# Patient Record
Sex: FEMALE | Race: WHITE | NOT HISPANIC OR LATINO | Employment: FULL TIME | URBAN - METROPOLITAN AREA
[De-identification: names, ages, dates, MRNs, and addresses within clinical notes are randomized per-mention and may not be internally consistent; named-entity substitution may affect disease eponyms.]

---

## 2017-05-12 ENCOUNTER — ALLSCRIPTS OFFICE VISIT (OUTPATIENT)
Dept: OTHER | Facility: OTHER | Age: 27
End: 2017-05-12

## 2017-05-12 ENCOUNTER — APPOINTMENT (OUTPATIENT)
Dept: LAB | Facility: HOSPITAL | Age: 27
End: 2017-05-12
Attending: OBSTETRICS & GYNECOLOGY
Payer: COMMERCIAL

## 2017-05-12 DIAGNOSIS — O36.80X0 PREGNANCY WITH INCONCLUSIVE FETAL VIABILITY: ICD-10-CM

## 2017-05-12 DIAGNOSIS — Z32.01 ENCOUNTER FOR PREGNANCY TEST, RESULT POSITIVE: ICD-10-CM

## 2017-05-12 LAB
ABO GROUP BLD: NORMAL
B-HCG SERPL-ACNC: ABNORMAL MIU/ML
BLD GP AB SCN SERPL QL: NEGATIVE
PROGEST SERPL-MCNC: 8.5 NG/ML
RH BLD: POSITIVE
SPECIMEN EXPIRATION DATE: NORMAL

## 2017-05-12 PROCEDURE — 84702 CHORIONIC GONADOTROPIN TEST: CPT

## 2017-05-12 PROCEDURE — 36415 COLL VENOUS BLD VENIPUNCTURE: CPT

## 2017-05-12 PROCEDURE — 86900 BLOOD TYPING SEROLOGIC ABO: CPT

## 2017-05-12 PROCEDURE — 86850 RBC ANTIBODY SCREEN: CPT

## 2017-05-12 PROCEDURE — 84144 ASSAY OF PROGESTERONE: CPT

## 2017-05-12 PROCEDURE — 86901 BLOOD TYPING SEROLOGIC RH(D): CPT

## 2017-05-14 ENCOUNTER — APPOINTMENT (OUTPATIENT)
Dept: LAB | Facility: MEDICAL CENTER | Age: 27
End: 2017-05-14
Payer: COMMERCIAL

## 2017-05-14 DIAGNOSIS — Z32.01 ENCOUNTER FOR PREGNANCY TEST, RESULT POSITIVE: ICD-10-CM

## 2017-05-14 LAB — B-HCG SERPL-ACNC: ABNORMAL MIU/ML

## 2017-05-14 PROCEDURE — 36415 COLL VENOUS BLD VENIPUNCTURE: CPT

## 2017-05-14 PROCEDURE — 84702 CHORIONIC GONADOTROPIN TEST: CPT

## 2017-05-20 ENCOUNTER — HOSPITAL ENCOUNTER (OUTPATIENT)
Dept: ULTRASOUND IMAGING | Facility: HOSPITAL | Age: 27
Discharge: HOME | End: 2017-05-20
Attending: OBSTETRICS & GYNECOLOGY
Payer: COMMERCIAL

## 2017-05-20 DIAGNOSIS — O36.80X0 PREGNANCY WITH INCONCLUSIVE FETAL VIABILITY: ICD-10-CM

## 2017-05-20 PROCEDURE — 76801 OB US < 14 WKS SINGLE FETUS: CPT

## 2017-05-20 PROCEDURE — 76817 TRANSVAGINAL US OBSTETRIC: CPT

## 2017-05-25 ENCOUNTER — GENERIC CONVERSION - ENCOUNTER (OUTPATIENT)
Dept: OTHER | Facility: OTHER | Age: 27
End: 2017-05-25

## 2017-06-09 ENCOUNTER — ALLSCRIPTS OFFICE VISIT (OUTPATIENT)
Dept: OTHER | Facility: OTHER | Age: 27
End: 2017-06-09

## 2017-06-09 LAB
EXTERNAL HEMATOCRIT: 40.4 %
EXTERNAL HEMOGLOBIN: 13.7 G/DL
EXTERNAL HEPATITIS B SURFACE ANTIGEN: NEGATIVE
EXTERNAL HIV-1 ANTIBODY: NEGATIVE
EXTERNAL PLATELET COUNT: 199 K/ΜL
EXTERNAL RUBELLA IGG QUANTITATION: NORMAL
EXTERNAL SYPHILIS RPR SCREEN: NORMAL

## 2017-06-10 LAB
ABO GROUP BLD: NORMAL
BACTERIA UR QL AUTO: NORMAL
BASOPHILS # BLD AUTO: 0 %
BASOPHILS # BLD AUTO: 0 X10E3/UL (ref 0–0.2)
BILIRUB UR QL STRIP: NEGATIVE
BLD GP AB SCN SERPL QL: NEGATIVE
COLOR UR: YELLOW
COMMENT (HISTORICAL): CLEAR
DEPRECATED RDW RBC AUTO: 13.6 % (ref 12.3–15.4)
EOSINOPHIL # BLD AUTO: 0.1 X10E3/UL (ref 0–0.4)
EOSINOPHIL # BLD AUTO: 2 %
FECAL OCCULT BLOOD DIAGNOSTIC (HISTORICAL): NEGATIVE
GLUCOSE (HISTORICAL): NEGATIVE
HCT VFR BLD AUTO: 40.4 % (ref 34–46.6)
HEPATITIS B SURFACE ANTIGEN (HISTORICAL): NEGATIVE
HGB BLD-MCNC: 13.7 G/DL (ref 11.1–15.9)
HIV-1/2 AB-AG (HISTORICAL): NON REACTIVE
IMM.GRANULOCYTES (CD4/8) (HISTORICAL): 0 %
IMM.GRANULOCYTES (CD4/8) (HISTORICAL): 0 X10E3/UL (ref 0–0.1)
KETONES UR STRIP-MCNC: NEGATIVE MG/DL
LEUKOCYTE ESTERASE UR QL STRIP: NEGATIVE
LYMPHOCYTES # BLD AUTO: 2.1 X10E3/UL (ref 0.7–3.1)
LYMPHOCYTES # BLD AUTO: 25 %
MCH RBC QN AUTO: 30 PG (ref 26.6–33)
MCHC RBC AUTO-ENTMCNC: 33.9 G/DL (ref 31.5–35.7)
MCV RBC AUTO: 89 FL (ref 79–97)
MICROSCOPIC EXAMINATION (HISTORICAL): NORMAL
MICROSCOPIC EXAMINATION (HISTORICAL): NORMAL
MONOCYTES # BLD AUTO: 0.7 X10E3/UL (ref 0.1–0.9)
MONOCYTES (HISTORICAL): 8 %
NEUTROPHILS # BLD AUTO: 5.4 X10E3/UL (ref 1.4–7)
NEUTROPHILS # BLD AUTO: 65 %
NITRITE UR QL STRIP: NEGATIVE
NON-SQ EPI CELLS URNS QL MICRO: NORMAL /HPF
PH UR STRIP.AUTO: 7 [PH] (ref 5–7.5)
PLATELET # BLD AUTO: 199 X10E3/UL (ref 150–379)
PROT UR STRIP-MCNC: NEGATIVE MG/DL
RBC (HISTORICAL): 4.56 X10E6/UL (ref 3.77–5.28)
RBC (HISTORICAL): NORMAL /HPF
RH BLD: POSITIVE
RPR SCREEN (HISTORICAL): NON REACTIVE
RUBELLA, IGG (HISTORICAL): 1.9 INDEX
SP GR UR STRIP.AUTO: 1.01 (ref 1–1.03)
UROBILINOGEN UR QL STRIP.AUTO: 0.2 EU/DL (ref 0.2–1)
WBC # BLD AUTO: 8.4 X10E3/UL (ref 3.4–10.8)
WBC # BLD AUTO: NORMAL /HPF

## 2017-06-11 LAB
CULTURE RESULT (HISTORICAL): NORMAL
MISCELLANEOUS LAB TEST RESULT (HISTORICAL): NO GROWTH

## 2017-06-12 ENCOUNTER — GENERIC CONVERSION - ENCOUNTER (OUTPATIENT)
Dept: OTHER | Facility: OTHER | Age: 27
End: 2017-06-12

## 2017-06-26 ENCOUNTER — GENERIC CONVERSION - ENCOUNTER (OUTPATIENT)
Dept: OTHER | Facility: OTHER | Age: 27
End: 2017-06-26

## 2017-06-26 ENCOUNTER — ALLSCRIPTS OFFICE VISIT (OUTPATIENT)
Dept: PERINATAL CARE | Facility: CLINIC | Age: 27
End: 2017-06-26
Payer: COMMERCIAL

## 2017-06-26 PROCEDURE — 76813 OB US NUCHAL MEAS 1 GEST: CPT | Performed by: OBSTETRICS & GYNECOLOGY

## 2017-07-03 ENCOUNTER — GENERIC CONVERSION - ENCOUNTER (OUTPATIENT)
Dept: OTHER | Facility: OTHER | Age: 27
End: 2017-07-03

## 2017-07-03 ENCOUNTER — LAB REQUISITION (OUTPATIENT)
Dept: LAB | Facility: HOSPITAL | Age: 27
End: 2017-07-03
Payer: COMMERCIAL

## 2017-07-03 DIAGNOSIS — Z34.91 ENCOUNTER FOR SUPERVISION OF NORMAL PREGNANCY IN FIRST TRIMESTER: ICD-10-CM

## 2017-07-03 PROCEDURE — 87591 N.GONORRHOEAE DNA AMP PROB: CPT | Performed by: OBSTETRICS & GYNECOLOGY

## 2017-07-03 PROCEDURE — 87491 CHLMYD TRACH DNA AMP PROBE: CPT | Performed by: OBSTETRICS & GYNECOLOGY

## 2017-07-10 LAB — MISCELLANEOUS LAB TEST RESULT: NORMAL

## 2017-07-24 ENCOUNTER — GENERIC CONVERSION - ENCOUNTER (OUTPATIENT)
Dept: OTHER | Facility: OTHER | Age: 27
End: 2017-07-24

## 2017-08-21 ENCOUNTER — ALLSCRIPTS OFFICE VISIT (OUTPATIENT)
Dept: PERINATAL CARE | Facility: CLINIC | Age: 27
End: 2017-08-21
Payer: COMMERCIAL

## 2017-08-21 ENCOUNTER — GENERIC CONVERSION - ENCOUNTER (OUTPATIENT)
Dept: OTHER | Facility: OTHER | Age: 27
End: 2017-08-21

## 2017-08-21 PROCEDURE — 76817 TRANSVAGINAL US OBSTETRIC: CPT | Performed by: OBSTETRICS & GYNECOLOGY

## 2017-08-21 PROCEDURE — 76811 OB US DETAILED SNGL FETUS: CPT | Performed by: OBSTETRICS & GYNECOLOGY

## 2017-08-22 ENCOUNTER — ALLSCRIPTS OFFICE VISIT (OUTPATIENT)
Dept: OTHER | Facility: OTHER | Age: 27
End: 2017-08-22

## 2017-08-22 ENCOUNTER — LAB REQUISITION (OUTPATIENT)
Dept: LAB | Facility: HOSPITAL | Age: 27
End: 2017-08-22
Payer: COMMERCIAL

## 2017-08-22 DIAGNOSIS — O99.212 OBESITY COMPLICATING PREGNANCY IN SECOND TRIMESTER: ICD-10-CM

## 2017-08-22 LAB — GLUCOSE 1H P 50 G GLC PO SERPL-MCNC: 138 MG/DL

## 2017-08-22 PROCEDURE — 82950 GLUCOSE TEST: CPT | Performed by: OBSTETRICS & GYNECOLOGY

## 2017-08-23 ENCOUNTER — GENERIC CONVERSION - ENCOUNTER (OUTPATIENT)
Dept: OTHER | Facility: OTHER | Age: 27
End: 2017-08-23

## 2017-08-23 DIAGNOSIS — O99.212 OBESITY COMPLICATING PREGNANCY IN SECOND TRIMESTER: ICD-10-CM

## 2017-08-23 DIAGNOSIS — R73.09 OTHER ABNORMAL GLUCOSE: ICD-10-CM

## 2017-08-23 DIAGNOSIS — Z34.91 ENCOUNTER FOR SUPERVISION OF NORMAL PREGNANCY IN FIRST TRIMESTER: ICD-10-CM

## 2017-08-25 ENCOUNTER — APPOINTMENT (OUTPATIENT)
Dept: LAB | Facility: HOSPITAL | Age: 27
End: 2017-08-25
Attending: OBSTETRICS & GYNECOLOGY
Payer: COMMERCIAL

## 2017-08-25 DIAGNOSIS — Z34.91 ENCOUNTER FOR SUPERVISION OF NORMAL PREGNANCY IN FIRST TRIMESTER: ICD-10-CM

## 2017-08-25 LAB
GLUCOSE 1H P 100 G GLC PO SERPL-MCNC: 137 MG/DL (ref 65–179)
GLUCOSE 2H P 100 G GLC PO SERPL-MCNC: 100 MG/DL (ref 65–154)
GLUCOSE 3H P 100 G GLC PO SERPL-MCNC: 58 MG/DL (ref 65–139)
GLUCOSE P FAST SERPL-MCNC: 75 MG/DL (ref 65–99)

## 2017-08-25 PROCEDURE — 82951 GLUCOSE TOLERANCE TEST (GTT): CPT

## 2017-08-25 PROCEDURE — 36415 COLL VENOUS BLD VENIPUNCTURE: CPT

## 2017-08-25 PROCEDURE — 82952 GTT-ADDED SAMPLES: CPT

## 2017-08-28 ENCOUNTER — GENERIC CONVERSION - ENCOUNTER (OUTPATIENT)
Dept: OTHER | Facility: OTHER | Age: 27
End: 2017-08-28

## 2017-09-18 ENCOUNTER — ALLSCRIPTS OFFICE VISIT (OUTPATIENT)
Dept: OTHER | Facility: OTHER | Age: 27
End: 2017-09-18

## 2017-09-19 ENCOUNTER — ALLSCRIPTS OFFICE VISIT (OUTPATIENT)
Dept: OTHER | Facility: OTHER | Age: 27
End: 2017-09-19

## 2017-10-10 ENCOUNTER — GENERIC CONVERSION - ENCOUNTER (OUTPATIENT)
Dept: OTHER | Facility: OTHER | Age: 27
End: 2017-10-10

## 2017-10-14 ENCOUNTER — APPOINTMENT (OUTPATIENT)
Dept: LAB | Facility: HOSPITAL | Age: 27
End: 2017-10-14
Payer: COMMERCIAL

## 2017-10-14 DIAGNOSIS — R73.09 OTHER ABNORMAL GLUCOSE: ICD-10-CM

## 2017-10-14 DIAGNOSIS — O99.212 OBESITY COMPLICATING PREGNANCY IN SECOND TRIMESTER: ICD-10-CM

## 2017-10-14 LAB
BASOPHILS # BLD AUTO: 0.02 THOUSANDS/ΜL (ref 0–0.1)
BASOPHILS NFR BLD AUTO: 0 % (ref 0–1)
EOSINOPHIL # BLD AUTO: 0.35 THOUSAND/ΜL (ref 0–0.61)
EOSINOPHIL NFR BLD AUTO: 4 % (ref 0–6)
ERYTHROCYTE [DISTWIDTH] IN BLOOD BY AUTOMATED COUNT: 13.2 % (ref 11.6–15.1)
EXTERNAL HEMATOCRIT: 35.5 %
EXTERNAL HEMOGLOBIN: 11.9 G/DL
EXTERNAL PLATELET COUNT: 157 K/ΜL
GLUCOSE 1H P 100 G GLC PO SERPL-MCNC: 152 MG/DL (ref 65–179)
GLUCOSE 2H P 100 G GLC PO SERPL-MCNC: 141 MG/DL (ref 65–154)
GLUCOSE 3H P 100 G GLC PO SERPL-MCNC: 112 MG/DL (ref 65–139)
GLUCOSE P FAST SERPL-MCNC: 83 MG/DL (ref 65–99)
HCT VFR BLD AUTO: 35.5 % (ref 34.8–46.1)
HGB BLD-MCNC: 11.9 G/DL (ref 11.5–15.4)
LYMPHOCYTES # BLD AUTO: 1.78 THOUSANDS/ΜL (ref 0.6–4.47)
LYMPHOCYTES NFR BLD AUTO: 18 % (ref 14–44)
MCH RBC QN AUTO: 30.6 PG (ref 26.8–34.3)
MCHC RBC AUTO-ENTMCNC: 33.5 G/DL (ref 31.4–37.4)
MCV RBC AUTO: 91 FL (ref 82–98)
MONOCYTES # BLD AUTO: 0.79 THOUSAND/ΜL (ref 0.17–1.22)
MONOCYTES NFR BLD AUTO: 8 % (ref 4–12)
NEUTROPHILS # BLD AUTO: 6.77 THOUSANDS/ΜL (ref 1.85–7.62)
NEUTS SEG NFR BLD AUTO: 70 % (ref 43–75)
NRBC BLD AUTO-RTO: 0 /100 WBCS
PLATELET # BLD AUTO: 157 THOUSANDS/UL (ref 149–390)
PMV BLD AUTO: 10.3 FL (ref 8.9–12.7)
RBC # BLD AUTO: 3.89 MILLION/UL (ref 3.81–5.12)
WBC # BLD AUTO: 9.71 THOUSAND/UL (ref 4.31–10.16)

## 2017-10-14 PROCEDURE — 36415 COLL VENOUS BLD VENIPUNCTURE: CPT

## 2017-10-14 PROCEDURE — 82952 GTT-ADDED SAMPLES: CPT

## 2017-10-14 PROCEDURE — 82951 GLUCOSE TOLERANCE TEST (GTT): CPT

## 2017-10-14 PROCEDURE — 85025 COMPLETE CBC W/AUTO DIFF WBC: CPT

## 2017-10-25 ENCOUNTER — GENERIC CONVERSION - ENCOUNTER (OUTPATIENT)
Dept: OTHER | Facility: OTHER | Age: 27
End: 2017-10-25

## 2017-11-08 ENCOUNTER — GENERIC CONVERSION - ENCOUNTER (OUTPATIENT)
Dept: OTHER | Facility: OTHER | Age: 27
End: 2017-11-08

## 2017-11-08 ENCOUNTER — ALLSCRIPTS OFFICE VISIT (OUTPATIENT)
Dept: OTHER | Facility: OTHER | Age: 27
End: 2017-11-08

## 2017-11-09 ENCOUNTER — GENERIC CONVERSION - ENCOUNTER (OUTPATIENT)
Dept: OTHER | Facility: OTHER | Age: 27
End: 2017-11-09

## 2017-11-09 ENCOUNTER — ALLSCRIPTS OFFICE VISIT (OUTPATIENT)
Dept: PERINATAL CARE | Facility: CLINIC | Age: 27
End: 2017-11-09
Payer: COMMERCIAL

## 2017-11-09 PROCEDURE — 76816 OB US FOLLOW-UP PER FETUS: CPT | Performed by: OBSTETRICS & GYNECOLOGY

## 2017-11-22 ENCOUNTER — GENERIC CONVERSION - ENCOUNTER (OUTPATIENT)
Dept: OTHER | Facility: OTHER | Age: 27
End: 2017-11-22

## 2017-12-05 ENCOUNTER — LAB REQUISITION (OUTPATIENT)
Dept: LAB | Facility: HOSPITAL | Age: 27
End: 2017-12-05
Payer: COMMERCIAL

## 2017-12-05 ENCOUNTER — GENERIC CONVERSION - ENCOUNTER (OUTPATIENT)
Dept: OTHER | Facility: OTHER | Age: 27
End: 2017-12-05

## 2017-12-05 DIAGNOSIS — Z34.93 ENCOUNTER FOR SUPERVISION OF NORMAL PREGNANCY IN THIRD TRIMESTER: ICD-10-CM

## 2017-12-05 LAB
BASOPHILS # BLD AUTO: 0.02 THOUSANDS/ΜL (ref 0–0.1)
BASOPHILS NFR BLD AUTO: 0 % (ref 0–1)
EOSINOPHIL # BLD AUTO: 0.38 THOUSAND/ΜL (ref 0–0.61)
EOSINOPHIL NFR BLD AUTO: 4 % (ref 0–6)
ERYTHROCYTE [DISTWIDTH] IN BLOOD BY AUTOMATED COUNT: 13.1 % (ref 11.6–15.1)
HCT VFR BLD AUTO: 33.5 % (ref 34.8–46.1)
HGB BLD-MCNC: 11.1 G/DL (ref 11.5–15.4)
LYMPHOCYTES # BLD AUTO: 1.47 THOUSANDS/ΜL (ref 0.6–4.47)
LYMPHOCYTES NFR BLD AUTO: 17 % (ref 14–44)
MCH RBC QN AUTO: 29.2 PG (ref 26.8–34.3)
MCHC RBC AUTO-ENTMCNC: 33.1 G/DL (ref 31.4–37.4)
MCV RBC AUTO: 88 FL (ref 82–98)
MONOCYTES # BLD AUTO: 0.83 THOUSAND/ΜL (ref 0.17–1.22)
MONOCYTES NFR BLD AUTO: 9 % (ref 4–12)
NEUTROPHILS # BLD AUTO: 6.15 THOUSANDS/ΜL (ref 1.85–7.62)
NEUTS SEG NFR BLD AUTO: 70 % (ref 43–75)
NRBC BLD AUTO-RTO: 0 /100 WBCS
PLATELET # BLD AUTO: 183 THOUSANDS/UL (ref 149–390)
PMV BLD AUTO: 10.8 FL (ref 8.9–12.7)
RBC # BLD AUTO: 3.8 MILLION/UL (ref 3.81–5.12)
WBC # BLD AUTO: 8.9 THOUSAND/UL (ref 4.31–10.16)

## 2017-12-05 PROCEDURE — 85025 COMPLETE CBC W/AUTO DIFF WBC: CPT | Performed by: OBSTETRICS & GYNECOLOGY

## 2017-12-05 PROCEDURE — 87653 STREP B DNA AMP PROBE: CPT | Performed by: OBSTETRICS & GYNECOLOGY

## 2017-12-07 ENCOUNTER — APPOINTMENT (OUTPATIENT)
Dept: PERINATAL CARE | Facility: CLINIC | Age: 27
End: 2017-12-07
Payer: COMMERCIAL

## 2017-12-07 ENCOUNTER — GENERIC CONVERSION - ENCOUNTER (OUTPATIENT)
Dept: OTHER | Facility: OTHER | Age: 27
End: 2017-12-07

## 2017-12-07 LAB — GP B STREP DNA SPEC QL NAA+PROBE: NORMAL

## 2017-12-07 PROCEDURE — 59025 FETAL NON-STRESS TEST: CPT | Performed by: OBSTETRICS & GYNECOLOGY

## 2017-12-07 PROCEDURE — 76816 OB US FOLLOW-UP PER FETUS: CPT | Performed by: OBSTETRICS & GYNECOLOGY

## 2017-12-12 ENCOUNTER — GENERIC CONVERSION - ENCOUNTER (OUTPATIENT)
Dept: OTHER | Facility: OTHER | Age: 27
End: 2017-12-12

## 2017-12-14 ENCOUNTER — GENERIC CONVERSION - ENCOUNTER (OUTPATIENT)
Dept: OTHER | Facility: OTHER | Age: 27
End: 2017-12-14

## 2017-12-14 ENCOUNTER — APPOINTMENT (OUTPATIENT)
Dept: PERINATAL CARE | Facility: CLINIC | Age: 27
End: 2017-12-14
Payer: COMMERCIAL

## 2017-12-14 PROCEDURE — 59025 FETAL NON-STRESS TEST: CPT | Performed by: OBSTETRICS & GYNECOLOGY

## 2017-12-14 PROCEDURE — 76815 OB US LIMITED FETUS(S): CPT | Performed by: OBSTETRICS & GYNECOLOGY

## 2017-12-19 ENCOUNTER — GENERIC CONVERSION - ENCOUNTER (OUTPATIENT)
Dept: OTHER | Facility: OTHER | Age: 27
End: 2017-12-19

## 2017-12-21 ENCOUNTER — GENERIC CONVERSION - ENCOUNTER (OUTPATIENT)
Dept: OTHER | Facility: OTHER | Age: 27
End: 2017-12-21

## 2017-12-21 ENCOUNTER — APPOINTMENT (OUTPATIENT)
Dept: PERINATAL CARE | Facility: CLINIC | Age: 27
End: 2017-12-21
Payer: COMMERCIAL

## 2017-12-21 PROCEDURE — 59025 FETAL NON-STRESS TEST: CPT | Performed by: OBSTETRICS & GYNECOLOGY

## 2017-12-21 PROCEDURE — 76815 OB US LIMITED FETUS(S): CPT | Performed by: OBSTETRICS & GYNECOLOGY

## 2017-12-26 ENCOUNTER — GENERIC CONVERSION - ENCOUNTER (OUTPATIENT)
Dept: OTHER | Facility: OTHER | Age: 27
End: 2017-12-26

## 2017-12-28 ENCOUNTER — APPOINTMENT (OUTPATIENT)
Dept: PERINATAL CARE | Facility: CLINIC | Age: 27
End: 2017-12-28
Payer: COMMERCIAL

## 2017-12-28 ENCOUNTER — GENERIC CONVERSION - ENCOUNTER (OUTPATIENT)
Dept: OTHER | Facility: OTHER | Age: 27
End: 2017-12-28

## 2017-12-28 ENCOUNTER — ANESTHESIA EVENT (INPATIENT)
Dept: LABOR AND DELIVERY | Facility: HOSPITAL | Age: 27
End: 2017-12-28
Payer: COMMERCIAL

## 2017-12-28 PROCEDURE — 76815 OB US LIMITED FETUS(S): CPT | Performed by: OBSTETRICS & GYNECOLOGY

## 2017-12-28 PROCEDURE — 59025 FETAL NON-STRESS TEST: CPT | Performed by: OBSTETRICS & GYNECOLOGY

## 2017-12-29 ENCOUNTER — HOSPITAL ENCOUNTER (INPATIENT)
Facility: HOSPITAL | Age: 27
LOS: 3 days | Discharge: HOME | End: 2018-01-01
Attending: OBSTETRICS & GYNECOLOGY | Admitting: OBSTETRICS & GYNECOLOGY
Payer: COMMERCIAL

## 2017-12-29 ENCOUNTER — ANESTHESIA (INPATIENT)
Dept: LABOR AND DELIVERY | Facility: HOSPITAL | Age: 27
End: 2017-12-29
Payer: COMMERCIAL

## 2017-12-29 DIAGNOSIS — Z3A.39 39 WEEKS GESTATION OF PREGNANCY: Primary | ICD-10-CM

## 2017-12-29 PROBLEM — Z98.891 S/P CESAREAN SECTION: Status: ACTIVE | Noted: 2017-12-29

## 2017-12-29 PROBLEM — O99.211 MATERNAL OBESITY, ANTEPARTUM, FIRST TRIMESTER: Status: ACTIVE | Noted: 2017-06-28

## 2017-12-29 PROBLEM — E66.01 MORBID OBESITY DUE TO EXCESS CALORIES (HCC): Status: ACTIVE | Noted: 2017-12-29

## 2017-12-29 LAB
ABO GROUP BLD: NORMAL
BASE EXCESS BLDCOA CALC-SCNC: -4.2 MMOL/L (ref 3–11)
BASE EXCESS BLDCOV CALC-SCNC: -2.4 MMOL/L (ref 1–9)
BLD GP AB SCN SERPL QL: NEGATIVE
ERYTHROCYTE [DISTWIDTH] IN BLOOD BY AUTOMATED COUNT: 13.5 % (ref 11.6–15.1)
HCO3 BLDCOA-SCNC: 23.3 MMOL/L (ref 17.3–27.3)
HCO3 BLDCOV-SCNC: 24.2 MMOL/L (ref 12.2–28.6)
HCT VFR BLD AUTO: 34.1 % (ref 34.8–46.1)
HGB BLD-MCNC: 10.8 G/DL (ref 11.5–15.4)
MCH RBC QN AUTO: 27.9 PG (ref 26.8–34.3)
MCHC RBC AUTO-ENTMCNC: 31.7 G/DL (ref 31.4–37.4)
MCV RBC AUTO: 88 FL (ref 82–98)
O2 CT VFR BLDCOA CALC: 2.7 ML/DL
OXYHGB MFR BLDCOA: 13.8 %
OXYHGB MFR BLDCOV: 42.7 %
PCO2 BLDCOA: 52.8 MM[HG] (ref 30–60)
PCO2 BLDCOV: 48.3 MM HG (ref 27–43)
PH BLDCOA: 7.26 [PH] (ref 7.23–7.43)
PH BLDCOV: 7.32 [PH] (ref 7.19–7.49)
PLATELET # BLD AUTO: 191 THOUSANDS/UL (ref 149–390)
PMV BLD AUTO: 10.1 FL (ref 8.9–12.7)
PO2 BLDCOA: 20.5 MM HG (ref 5–25)
PO2 BLDCOV: 21.2 MM HG (ref 15–45)
RBC # BLD AUTO: 3.87 MILLION/UL (ref 3.81–5.12)
RH BLD: POSITIVE
RPR SER QL: NORMAL
SAO2 % BLDCOV: 8.8 ML/DL
SPECIMEN EXPIRATION DATE: NORMAL
WBC # BLD AUTO: 8.52 THOUSAND/UL (ref 4.31–10.16)

## 2017-12-29 PROCEDURE — 85027 COMPLETE CBC AUTOMATED: CPT | Performed by: OBSTETRICS & GYNECOLOGY

## 2017-12-29 PROCEDURE — 86592 SYPHILIS TEST NON-TREP QUAL: CPT | Performed by: OBSTETRICS & GYNECOLOGY

## 2017-12-29 PROCEDURE — 3E0R3BZ INTRODUCTION OF ANESTHETIC AGENT INTO SPINAL CANAL, PERCUTANEOUS APPROACH: ICD-10-PCS | Performed by: STUDENT IN AN ORGANIZED HEALTH CARE EDUCATION/TRAINING PROGRAM

## 2017-12-29 PROCEDURE — 86900 BLOOD TYPING SEROLOGIC ABO: CPT | Performed by: OBSTETRICS & GYNECOLOGY

## 2017-12-29 PROCEDURE — 82805 BLOOD GASES W/O2 SATURATION: CPT | Performed by: OBSTETRICS & GYNECOLOGY

## 2017-12-29 PROCEDURE — 94762 N-INVAS EAR/PLS OXIMTRY CONT: CPT

## 2017-12-29 PROCEDURE — 86901 BLOOD TYPING SEROLOGIC RH(D): CPT | Performed by: OBSTETRICS & GYNECOLOGY

## 2017-12-29 PROCEDURE — 86850 RBC ANTIBODY SCREEN: CPT | Performed by: OBSTETRICS & GYNECOLOGY

## 2017-12-29 RX ORDER — PROPOFOL 10 MG/ML
INJECTION, EMULSION INTRAVENOUS AS NEEDED
Status: DISCONTINUED | OUTPATIENT
Start: 2017-12-29 | End: 2017-12-29 | Stop reason: SURG

## 2017-12-29 RX ORDER — DEXAMETHASONE SODIUM PHOSPHATE 10 MG/ML
8 INJECTION, SOLUTION INTRAMUSCULAR; INTRAVENOUS ONCE AS NEEDED
Status: ACTIVE | OUTPATIENT
Start: 2017-12-29 | End: 2017-12-30

## 2017-12-29 RX ORDER — IBUPROFEN 600 MG/1
600 TABLET ORAL EVERY 6 HOURS PRN
Status: DISCONTINUED | OUTPATIENT
Start: 2017-12-29 | End: 2018-01-01 | Stop reason: HOSPADM

## 2017-12-29 RX ORDER — MORPHINE SULFATE 0.5 MG/ML
INJECTION, SOLUTION EPIDURAL; INTRATHECAL; INTRAVENOUS
Status: COMPLETED
Start: 2017-12-29 | End: 2017-12-29

## 2017-12-29 RX ORDER — DOCUSATE SODIUM 100 MG/1
100 CAPSULE, LIQUID FILLED ORAL 2 TIMES DAILY
Status: DISCONTINUED | OUTPATIENT
Start: 2017-12-29 | End: 2018-01-01 | Stop reason: HOSPADM

## 2017-12-29 RX ORDER — ONDANSETRON 2 MG/ML
4 INJECTION INTRAMUSCULAR; INTRAVENOUS EVERY 4 HOURS PRN
Status: DISPENSED | OUTPATIENT
Start: 2017-12-29 | End: 2017-12-30

## 2017-12-29 RX ORDER — ONDANSETRON 2 MG/ML
4 INJECTION INTRAMUSCULAR; INTRAVENOUS EVERY 8 HOURS PRN
Status: DISCONTINUED | OUTPATIENT
Start: 2017-12-29 | End: 2018-01-01 | Stop reason: HOSPADM

## 2017-12-29 RX ORDER — SODIUM CHLORIDE, SODIUM LACTATE, POTASSIUM CHLORIDE, CALCIUM CHLORIDE 600; 310; 30; 20 MG/100ML; MG/100ML; MG/100ML; MG/100ML
125 INJECTION, SOLUTION INTRAVENOUS CONTINUOUS
Status: DISCONTINUED | OUTPATIENT
Start: 2017-12-29 | End: 2017-12-29

## 2017-12-29 RX ORDER — OXYTOCIN/RINGER'S LACTATE 30/500 ML
250 PLASTIC BAG, INJECTION (ML) INTRAVENOUS CONTINUOUS
Status: DISCONTINUED | OUTPATIENT
Start: 2017-12-29 | End: 2018-01-01 | Stop reason: HOSPADM

## 2017-12-29 RX ORDER — ONDANSETRON 2 MG/ML
INJECTION INTRAMUSCULAR; INTRAVENOUS AS NEEDED
Status: DISCONTINUED | OUTPATIENT
Start: 2017-12-29 | End: 2017-12-29 | Stop reason: SURG

## 2017-12-29 RX ORDER — KETOROLAC TROMETHAMINE 30 MG/ML
30 INJECTION, SOLUTION INTRAMUSCULAR; INTRAVENOUS EVERY 6 HOURS
Status: COMPLETED | OUTPATIENT
Start: 2017-12-29 | End: 2017-12-30

## 2017-12-29 RX ORDER — SODIUM CHLORIDE, SODIUM LACTATE, POTASSIUM CHLORIDE, CALCIUM CHLORIDE 600; 310; 30; 20 MG/100ML; MG/100ML; MG/100ML; MG/100ML
125 INJECTION, SOLUTION INTRAVENOUS CONTINUOUS
Status: DISCONTINUED | OUTPATIENT
Start: 2017-12-29 | End: 2018-01-01 | Stop reason: HOSPADM

## 2017-12-29 RX ORDER — METOCLOPRAMIDE HYDROCHLORIDE 5 MG/ML
5 INJECTION INTRAMUSCULAR; INTRAVENOUS EVERY 6 HOURS PRN
Status: ACTIVE | OUTPATIENT
Start: 2017-12-29 | End: 2017-12-30

## 2017-12-29 RX ORDER — DIAPER,BRIEF,INFANT-TODD,DISP
EACH MISCELLANEOUS 4 TIMES DAILY PRN
Status: DISCONTINUED | OUTPATIENT
Start: 2017-12-29 | End: 2018-01-01 | Stop reason: HOSPADM

## 2017-12-29 RX ORDER — MORPHINE SULFATE 0.5 MG/ML
INJECTION, SOLUTION EPIDURAL; INTRATHECAL; INTRAVENOUS AS NEEDED
Status: DISCONTINUED | OUTPATIENT
Start: 2017-12-29 | End: 2017-12-29 | Stop reason: SURG

## 2017-12-29 RX ORDER — OXYTOCIN/RINGER'S LACTATE 30/500 ML
62.5 PLASTIC BAG, INJECTION (ML) INTRAVENOUS CONTINUOUS
Status: DISPENSED | OUTPATIENT
Start: 2017-12-29 | End: 2017-12-29

## 2017-12-29 RX ORDER — ACETAMINOPHEN 325 MG/1
650 TABLET ORAL EVERY 6 HOURS PRN
Status: DISCONTINUED | OUTPATIENT
Start: 2017-12-29 | End: 2018-01-01 | Stop reason: HOSPADM

## 2017-12-29 RX ORDER — BUPIVACAINE HYDROCHLORIDE 7.5 MG/ML
INJECTION, SOLUTION INTRASPINAL AS NEEDED
Status: DISCONTINUED | OUTPATIENT
Start: 2017-12-29 | End: 2017-12-29 | Stop reason: SURG

## 2017-12-29 RX ORDER — NALBUPHINE HCL 10 MG/ML
5 AMPUL (ML) INJECTION
Status: ACTIVE | OUTPATIENT
Start: 2017-12-29 | End: 2017-12-30

## 2017-12-29 RX ORDER — ONDANSETRON 2 MG/ML
4 INJECTION INTRAMUSCULAR; INTRAVENOUS EVERY 8 HOURS PRN
Status: DISCONTINUED | OUTPATIENT
Start: 2017-12-29 | End: 2017-12-29

## 2017-12-29 RX ORDER — OXYCODONE HYDROCHLORIDE AND ACETAMINOPHEN 5; 325 MG/1; MG/1
2 TABLET ORAL EVERY 4 HOURS PRN
Status: DISCONTINUED | OUTPATIENT
Start: 2017-12-30 | End: 2018-01-01 | Stop reason: HOSPADM

## 2017-12-29 RX ORDER — NALOXONE HYDROCHLORIDE 0.4 MG/ML
0.1 INJECTION, SOLUTION INTRAMUSCULAR; INTRAVENOUS; SUBCUTANEOUS
Status: ACTIVE | OUTPATIENT
Start: 2017-12-29 | End: 2017-12-30

## 2017-12-29 RX ORDER — OXYCODONE HYDROCHLORIDE AND ACETAMINOPHEN 5; 325 MG/1; MG/1
1 TABLET ORAL EVERY 4 HOURS PRN
Status: DISCONTINUED | OUTPATIENT
Start: 2017-12-30 | End: 2018-01-01 | Stop reason: HOSPADM

## 2017-12-29 RX ORDER — ACETAMINOPHEN 325 MG/1
650 TABLET ORAL EVERY 6 HOURS PRN
Status: ACTIVE | OUTPATIENT
Start: 2017-12-29 | End: 2017-12-30

## 2017-12-29 RX ADMIN — MORPHINE SULFATE 0.2 MG: 0.5 INJECTION, SOLUTION EPIDURAL; INTRATHECAL; INTRAVENOUS at 07:59

## 2017-12-29 RX ADMIN — ONDANSETRON HYDROCHLORIDE 4 MG: 2 INJECTION, SOLUTION INTRAVENOUS at 17:44

## 2017-12-29 RX ADMIN — MORPHINE SULFATE 1 MG: 0.5 INJECTION, SOLUTION EPIDURAL; INTRATHECAL; INTRAVENOUS at 09:00

## 2017-12-29 RX ADMIN — BUPIVACAINE HYDROCHLORIDE IN DEXTROSE 1.6 ML: 7.5 INJECTION, SOLUTION SUBARACHNOID at 07:59

## 2017-12-29 RX ADMIN — PROPOFOL 20 MG: 10 INJECTION, EMULSION INTRAVENOUS at 08:46

## 2017-12-29 RX ADMIN — MORPHINE SULFATE 1 MG: 0.5 INJECTION, SOLUTION EPIDURAL; INTRATHECAL; INTRAVENOUS at 09:01

## 2017-12-29 RX ADMIN — KETOROLAC TROMETHAMINE 30 MG: 30 INJECTION, SOLUTION INTRAMUSCULAR at 11:05

## 2017-12-29 RX ADMIN — Medication 62.5 MILLI-UNITS/MIN: at 10:11

## 2017-12-29 RX ADMIN — PROPOFOL 20 MG: 10 INJECTION, EMULSION INTRAVENOUS at 08:52

## 2017-12-29 RX ADMIN — OXYTOCIN 250 MILLI-UNITS/MIN: 10 INJECTION, SOLUTION INTRAMUSCULAR; INTRAVENOUS at 08:31

## 2017-12-29 RX ADMIN — DOCUSATE SODIUM 100 MG: 100 CAPSULE, LIQUID FILLED ORAL at 17:36

## 2017-12-29 RX ADMIN — CEFAZOLIN SODIUM 2000 MG: 1 INJECTION, POWDER, FOR SOLUTION INTRAMUSCULAR; INTRAVENOUS at 08:01

## 2017-12-29 RX ADMIN — SODIUM CHLORIDE, SODIUM LACTATE, POTASSIUM CHLORIDE, AND CALCIUM CHLORIDE 125 ML/HR: .6; .31; .03; .02 INJECTION, SOLUTION INTRAVENOUS at 17:35

## 2017-12-29 RX ADMIN — SODIUM CHLORIDE, SODIUM LACTATE, POTASSIUM CHLORIDE, AND CALCIUM CHLORIDE 125 ML/HR: .6; .31; .03; .02 INJECTION, SOLUTION INTRAVENOUS at 09:50

## 2017-12-29 RX ADMIN — KETOROLAC TROMETHAMINE 30 MG: 30 INJECTION, SOLUTION INTRAMUSCULAR at 17:35

## 2017-12-29 RX ADMIN — SODIUM CHLORIDE, SODIUM LACTATE, POTASSIUM CHLORIDE, AND CALCIUM CHLORIDE: .6; .31; .03; .02 INJECTION, SOLUTION INTRAVENOUS at 08:04

## 2017-12-29 RX ADMIN — KETOROLAC TROMETHAMINE 30 MG: 30 INJECTION, SOLUTION INTRAMUSCULAR at 23:53

## 2017-12-29 RX ADMIN — SODIUM CHLORIDE, SODIUM LACTATE, POTASSIUM CHLORIDE, AND CALCIUM CHLORIDE 1000 ML: .6; .31; .03; .02 INJECTION, SOLUTION INTRAVENOUS at 06:15

## 2017-12-29 RX ADMIN — ONDANSETRON HYDROCHLORIDE 4 MG: 2 INJECTION, SOLUTION INTRAVENOUS at 08:39

## 2017-12-29 RX ADMIN — PROPOFOL 20 MG: 10 INJECTION, EMULSION INTRAVENOUS at 08:58

## 2017-12-29 NOTE — OP NOTE
OPERATIVE REPORT  PATIENT NAME: Tamanna Clemente    :  1990  MRN: [id number]  Pt Location: [facility] L[facility] OR ROOM 01    SURGERY DATE: 2017    Surgeon(s) and Role:     * Raissa Khan MD - Primary     * Andre Jamison MD - Assisting    Preop Diagnosis:  Elective primary  section  Obesity  Abnormal 1hr Glucola with normal 3hr GTT    Procedure(s) (LRB):   SECTION () (N/A)    Specimen(s):  ID Type Source Tests Collected by Time Destination   A :  Cord Blood Cord BLOOD GAS, VENOUS, CORD, BLOOD GAS, ARTERIAL, CORD Raissa Khan MD 2017 [id number]    B :  Tissue (Placenta on Hold) OB Only Placenta PLACENTA IN STORAGE Raissa Khan MD 2017 [id number]        Estimated Blood Loss:   800mL    Drains:   None    Anesthesia Type:   Spinal    Operative Indications:  Elective primary  section  Suspected Macrosomia     Operative Findings:  Viable female  at 0830, APGARs  9/9, weight 8lbs 10oz  Clear amniotic fluid  Normal appearing placenta with 3-vessel cord  Normal appearing uterus, tubes and ovaries    Specimens:   1  Arterial and venous cord gases  Artrial pH 7 263, base excess -4 2  Venous pH 7 317, base excess -2 4  2  Cord blood  3  Segment of umbilical cord  4  Placenta to storage     Complications: None    Procedure Details   Decision was made to proceed with  section due to maternal request and suspected macrosomia  Patient was made aware of these findings and the proposed plan  Risks, benefits, possible complications, alternate treatment options, and expected outcomes were discussed with the patient  The patient agreed with the proposed plan and gave informed consent  The patient was taken to the operating room where she was properly identified to the OR staff and attending physician  She received Spinal anesthesia by Dr Eryn Low preoperatively  Fetal heart tones were appreciated and found to be appropriate   A Ma catheter and SCDs were in place  The vagina was prepped with Betadine and the abdomen was prepped with Chloraprep  Following appropriate drying time, the patient was draped in the usual sterile manner for a Pfannenstiel incision  The patient had received Ancef 2g IV pre-operatively for prophylaxis  A Time Out was held and the above information confirmed  The patient was identified as Darylene Robertson and the procedure verified as  Delivery  A Pfannenstiel incision was made and carried down through the underlying subcutaneous tissue to the fascia using a scalpel  Rectus fascia was then incised in the midline and extended laterally using Pérez scissors  The superior edge of the fascia was grasped with Kocher clamps, tented upward, and the underlying muscle was bluntly dissected off  The inferior edge was grasped with Kocher clamps and cleared in similar fashion  All anatomic layers were well-demarcated  The rectus muscles were  and the peritoneum was identified and subsequently entered and extended longitudinally with blunt dissection  The bladder blade was inserted  A low transverse uterine incision was made with the scalpel and extended laterally with blunt dissection  The amnion was entered sharply  Surgeons hand was inserted through the hysterotomy and the fetal head was palpated, with multiple attempts made to elevate the head through the hysterotomy  However, given difficulty elevating the fetal head, a vacuum was placed for assistance  The Kiwi cup was applied to the fetal median flexion point and centered over the sagittal sutures approx 3 cm anterior to the posterior fontanelle  A vacuum seal was established to a max of 550mmHg and gentle traction was applied  The fetal head was delivered through the hysterotomy with assistance of the Kiwi vacuum  There was 1 pull and 0 pop-offs   The vacuum was released upon delivery of the fetal head through the hysterotomy and delivery was performed thereafter with the assistance of fundal pressure  Baby had spontaneous cry with good color and tone  The umbilical cord was clamped and cut  The infant was handed off to the  providers  Arterial and venous cord gases, cord blood, and a segment of umbilical cord were obtained for evaluation and promptly sent to the lab  The placenta was expressed with uterine fundal massage and gentlel traction and was noted to have a 3 vessel cord  This was sent to the lab for storage  Pitocin was started by anesthesia  Uterus was noted to be firm and contracted down well  The uterus was exteriorized and a dry lap sponge was used to clear the cavity of clots and products of conception  The uterine incision was closed with a running locked suture of 0 Vicryl  A second layer of the same suture was used to imbricate the first closure  Good hemostasis was confirmed upon uterine closure  Warmed normal saline solution was used to irrigate the posterior culdesac and the uterus was returned to the abdomen  The paracolic gutters were inspected and cleared of all clots and debris with irrigation and moist lap sponges  The peritoneum and rectus muscle were closed using running, non-locked suture of 2-0 Vicryl  Fascia was closed with a running non-locked suture of 0 Vicryl  Subcutaneous tissues were closed with 3-0 Plain Gut in running non-locked suture  The skin was closed with 4-0 Monocryl in a subcuticular fashion  Histacryl was then applied  At the conclusion of the procedure, all needle, sponge, and instrument counts were noted to be correct x2  The patient tolerated the procedure well and was transferred to her the recovery room in stable condition  Beryle Shu, MD was present and participated in all key portions of the case        Patient Disposition:  PACU     SIGNATURE: Tara Chen MD  DATE: 2017  TIME: 9:35 AM

## 2017-12-29 NOTE — DISCHARGE INSTRUCTIONS
Section   WHAT YOU SHOULD KNOW:   A  delivery, or , is abdominal surgery to deliver your baby  There are many reasons you may need a   · A  may be scheduled before labor if you had a  with your last baby  It may be scheduled if your baby is not positioned normally, or you are pregnant with more than 1 baby  · Your caregiver may perform an emergency  during labor to prevent life-threatening complications for you or your baby  A  may be done if your cervix does not dilate after several hours of active labor  · Other reasons for a  include maternal infections and problems with the placenta  AFTER YOU LEAVE:   Medicines:   · Prescription pain medicine  may be given  Ask how to take this medicine safely  · Acetaminophen  decreases pain and fever  It is available without a doctor's order  Ask how much to take and how often to take it  Follow directions  Acetaminophen can cause liver damage if not taken correctly  · NSAIDs  help decrease swelling and pain or fever  This medicine is available with or without a doctor's order  NSAIDs can cause stomach bleeding or kidney problems in certain people  If you take blood thinner medicine, always ask your obstetrician if NSAIDs are safe for you  Always read the medicine label and follow directions  · Take your medicine as directed  Contact your obstetrician (OB) if you think your medicine is not helping or if you have side effects  Tell him if you are allergic to any medicine  Keep a list of the medicines, vitamins, and herbs you take  Include the amounts, and when and why you take them  Bring the list or the pill bottles to follow-up visits  Carry your medicine list with you in case of an emergency  Follow up with your OB as directed: You may need to return to have your stitches or staples removed   Write down your questions so you remember to ask them during your visits  Wound care:  Carefully wash your wound with soap and water every day  Keep your wound clean and dry  Wear loose, comfortable clothes that do not rub against your wound  Ask your OB about bathing and showering  Drink plenty of liquids: You can lower your risk for a blood clot if you drink plenty of liquids  Ask how much liquid to drink each day and which liquids are best for you  Limit activity until you have fully recovered from surgery:   · Ask when it is safe for you to drive, walk up stairs, lift heavy objects, and have sex  · Ask when it is okay to exercise, and what types of exercise to do  Start slowly and do more as you get stronger  Contact your OB if:   · You have heavy vaginal bleeding that fills 1 or more sanitary pads in 1 hour  · You have a fever  · Your incision is swollen, red, or draining pus  · You have questions or concerns about yourself or your baby  Seek care immediately or call 911 if:   · Blood soaks through your bandage  · Your stitches come apart  · You feel lightheaded, short of breath, and have chest pain  · You cough up blood  · Your arm or leg feels warm, tender, and painful  It may look swollen and red  © 2014 [address] is for End User's use only and may not be sold, redistributed or otherwise used for commercial purposes  All illustrations and images included in CareNotes® are the copyrighted property of A D A M , Inc  or Francisco Lopez  The above information is an  only  It is not intended as medical advice for individual conditions or treatments  Talk to your doctor, nurse or pharmacist before following any medical regimen to see if it is safe and effective for you

## 2017-12-29 NOTE — DISCHARGE SUMMARY
Discharge Summary - Marge Frankel 32 y o  female MRN: [id number]    Unit/Bed#: L&D 305-01 Encounter: [id number]    Admission Date: 2017     Discharge Date: 2018    Admitting Diagnosis:   1  Pregnancy at 39w6d  2  Obesity  4  Elective primary   5  Suspected Macrosomia    Discharge Diagnosis: same, delivered    Procedures: primary  section, low transverse incision    Admitting and DeliveringAttending: Yolanda Dodge MD     Discharging Attending: Same    Hospital Course:     Marge Frankel is a 32 y o   at 39w6d wks who was initially admitted for elective primary  section for suspected macrosomia  Pt received spinal anesthesia and 2g Ancef pre-operatively  She delivered a viable female  on 17 at 200  Weight 8lbs 10oz via primary  section, low transverse incision  Apgars were 9 (1 min) and 9 (5 min)  Patient tolerated the procedure well and was transferred to recovery in stable condition  Her post-operative course was uncomplicated  Preoperative hemaglobin was 10 8, postoperative was 9 0  Her postoperative pain was well controlled with oral analgesics  On day of discharge, she was ambulating and able to reasonably perform all ADLs  She was voiding and had appropriate bowel function  Pain was well controlled  She was discharged home on post-operative day #3 without complications  Patient was instructed to follow up with her OB as an outpatient and was given appropriate warnings to call provider if she develops signs of infection or uncontrolled pain  Complications: none apparent    Condition at discharge: good     Discharge instructions/Information to patient and family:   See after visit summary for information provided to patient and family  Provisions for Follow-Up Care:  See after visit summary for information related to follow-up care and any pertinent home health orders        Disposition: Home    Planned Readmission: No    [place], DO

## 2017-12-29 NOTE — LACTATION NOTE
This note was copied from a baby's chart  Called in and helped mom latch on left breast using football hold  Baby nursing well  Dad remains present and supportive

## 2017-12-29 NOTE — ANESTHESIA POSTPROCEDURE EVALUATION
Post-Op Assessment Note      CV Status:  Stable    Mental Status:  Alert and awake    Hydration Status:  Stable    PONV Controlled:  Controlled    Airway Patency:  Patent    Post Op Vitals Reviewed: Yes          Staff: Anesthesiologist           BP      Temp      Pulse     Resp      SpO2

## 2017-12-29 NOTE — PLAN OF CARE
BIRTH - VAGINAL/ SECTION     Fetal and maternal status remain reassuring during the birth process Completed     Emotionally satisfying birthing experience for mother/fetus Completed

## 2017-12-29 NOTE — ANESTHESIA PROCEDURE NOTES
Spinal Block    Patient location during procedure: OB  Start time: 12/29/2017 7:46 AM  Reason for block: primary anesthetic  Staffing  Anesthesiologist: Les Lee  Performed: anesthesiologist   Preanesthetic Checklist  Completed: patient identified, site marked, surgical consent, pre-op evaluation, timeout performed, IV checked, risks and benefits discussed and monitors and equipment checked  Spinal Block  Patient position: sitting  Prep: Betadine  Patient monitoring: heart rate, continuous pulse ox and frequent blood pressure checks  Approach: midline  Location: L4-5  Injection technique: single-shot  Needle  Needle type: pencil-tip   Needle length: 5 cm  Assessment  Sensory level: [id number] Assessment:  negative aspiration for heme, no paresthesia on injection and positive aspiration for clear CSF    Post-procedure:  site cleaned

## 2017-12-29 NOTE — H&P
H&P - Obstetrics   Mi Shine 32 y o  female MRN: [id number]  Unit/Bed#: L&D 329-01 Encounter: [id number]      History of Present Illness     Chief Complaint:  Scheduled primary  section    HPI:  Mi Shine is a 32 y o   at 39w6d weeks gestation who is being admitted for scheduled primary  section  Patient was counseled about the risks benefits and alternatives to a primary elective  and patient desires to proceed with  section  Her current obstetrical history is significant for morbid obesity BMI 42, failed 1 hour Glucola with normal 3 hour glucose tolerance; large gestational age-AC greater than 95th percentile:  8 lb by ultrasound 3 weeks ago  Contractions: None  Leakage of fluid: None  Vaginal Bleeding: None  Fetal movement: present  OB History    Para Term  AB Living   1             SAB TAB Ectopic Multiple Live Births                  # Outcome Date GA Lbr David/2nd Weight Sex Delivery Anes PTL Lv   1 Current                   Baby complications/comments:  Large for gestational age (AC > 95th percentile); 8 lb on ultrasound 3 weeks ago    Review of Systems   Constitutional: Negative for chills, diaphoresis, fatigue and fever  HENT: Negative  Respiratory: Negative for cough, choking, chest tightness, shortness of breath, wheezing and stridor  Cardiovascular: Negative for chest pain, palpitations and leg swelling  Gastrointestinal: Negative for abdominal pain, constipation, diarrhea, nausea and vomiting  Genitourinary: Negative for dysuria, flank pain, frequency, genital sores, pelvic pain, vaginal bleeding, vaginal discharge and vaginal pain  Musculoskeletal: Negative  Neurological: Negative for dizziness, seizures, syncope, facial asymmetry, weakness, light-headedness, numbness and headaches  Psychiatric/Behavioral: Negative  Historical Information   No past medical history on file    Past Surgical History: Procedure Laterality Date    WISDOM TOOTH EXTRACTION       Social History   History   Alcohol Use No     History   Drug Use No     History   Smoking Status    Never Smoker   Smokeless Tobacco    Never Used     Family History: non-contributory    Meds/Allergies      Prescriptions Prior to Admission   Medication    Prenatal Vit-Fe Fumarate-FA (PRENATAL VITAMIN PO)      No Known Allergies    OBJECTIVE:    Vitals: Blood pressure 120/79, pulse 98, temperature 97 9 °F (36 6 °C), temperature source Oral, resp  rate 18, height 5' 3" (1 6 m), weight 109 kg (240 lb)  Body mass index is 42 51 kg/m²  Physical Exam   Constitutional: She is oriented to person, place, and time  She appears well-developed and well-nourished  HENT:   Head: Normocephalic and atraumatic  Eyes: Conjunctivae and EOM are normal    Neck: Normal range of motion  Neck supple  Cardiovascular: Normal rate, regular rhythm, normal heart sounds and intact distal pulses  Exam reveals no gallop and no friction rub  No murmur heard  Pulmonary/Chest: Effort normal and breath sounds normal  No respiratory distress  She has no wheezes  She has no rales  Abdominal: Soft  Bowel sounds are normal  She exhibits no distension  There is no tenderness  There is no rebound and no guarding  Genitourinary: Vagina normal    Genitourinary Comments: Gravid uterus   Musculoskeletal: Normal range of motion  Neurological: She is alert and oriented to person, place, and time  Skin: Skin is warm and dry  Psychiatric: She has a normal mood and affect  Her behavior is normal  Judgment and thought content normal        Cervix: Not evaluated  Fetal heart rate: Baseline: 140 bpm, Variability: Moderate 6 - 25 bpm, Accelerations: Reactive, Decelerations: Absent and Category 1        [place]: none       EFW: 9lb 8oz    GBS: negative  Prenatal Labs: I have personally reviewed pertinent reports       AB positive  Antibody negative  Hemoglobin/hematocrit: 13   4  Platelets 170 K  Hepatitis-B surface antigen negative  HIV negative  Rubella immune  RPR nonreactive  Early 1 hour Glucola 138  Twenty-eight week 1 hour Glucola 157  3 hour glucose tolerance test:  83, 152, 141, 112  GBS negative    Invasive Devices          No matching active lines, drains, or airways            Assessment/Plan     ASSESSMENT:    Rory Shaikh 32 y o   at 39w6d weeks gestation who is being admitted for scheduled primary  section per maternal request     PLAN:   1) Admit   2) CBC, RPR, Blood Type, Ancef 3g   3) Analgesia per anesthesia   4) Anticipate 1LTCS   5) Case discussed with Dr Stella Lee MD  OB/GYN PGY-2  2017 6:20 AM

## 2017-12-29 NOTE — LACTATION NOTE
This note was copied from a baby's chart  CONSULT - LACTATION  Baby Girl Liu Boswell 0 days female MRN: [id number]    [facility] [address] NURSERY Room / Bed: L&D 305(N)/L&D 305(N) Encounter: [id number]    Maternal Information     MOTHER:  Erna Pierre  Maternal Age: 32 y o    OB History: #: 1, Date: 17, Sex: Female, Weight: 3912 g (8 lb 10 oz), GA: 39w6d, Delivery: , Low Transverse, Apgar1: 9, Apgar5: 9, Living: Living, Birth Comments: None   Previouse breast reduction surgery? No    Lactation history:   Has patient previously breast fed: No   How long had patient previously breast fed:     Previous breast feeding complications:       Past Surgical History:   Procedure Laterality Date    WISDOM TOOTH EXTRACTION         Birth information:  YOB: 2019   Time of birth: 8:30 AM   Sex: female   Delivery type: , Low Transverse   Birth Weight: 3912 g (8 lb 10 oz)   Percent of Weight Change: 0%     Gestational Age: 37w11d   [unfilled]    Assessment     Breast and nipple assessment: normal assessment    [place] Assessment: normal assessment    Feeding assessment: feeding well  LATCH:  Latch: Grasps breast, tongue down, lips flanged, rhythmic sucking   Audible Swallowing: Spontaneous and intermittent (24 hours old)   Type of Nipple: Everted (After stimulation)   Comfort (Breast/Nipple): Soft/non-tender   Hold (Positioning): No assist from staff, mother able to position/hold infant   LATCH Score: 6          Feeding recommendations:  breast feed on demand       Breastfeeding booklets given and reviewed with mother  Assisted with positioning and latching  Deep latch and good suck achieved on right breast using football hold  Demo hand expression  Encouraged mother to call for assistance as needed,phone # given  Dad present and supportive      Shirley Lozada RN 2017 11:34 AM

## 2017-12-29 NOTE — ANESTHESIA PREPROCEDURE EVALUATION
Review of Systems/Medical History  Patient summary reviewed  Chart reviewed  No history of anesthetic complications     Cardiovascular  Negative cardio ROS    Pulmonary  Negative pulmonary ROS ,        GI/Hepatic  Negative GI/hepatic ROS          Negative  ROS        Endo/Other  Negative endo/other ROS      GYN  Currently pregnant , Prior pregnancy/OB history : 1 ,          Hematology  Negative hematology ROS      Musculoskeletal  Obesity ,        Neurology  Negative neurology ROS      Psychology   Negative psychology ROS            Physical Exam    Airway    Mallampati score: II  TM Distance: >3 FB  Neck ROM: full     Dental   No notable dental hx     Cardiovascular  Comment: Negative ROS, Rhythm: regular, Rate: normal, Cardiovascular exam normal    Pulmonary  Pulmonary exam normal     Other Findings        Anesthesia Plan  ASA Score- 3     Anesthesia Type- spinal with ASA Monitors  Additional Monitors:   Airway Plan:         Plan Factors-Patient not instructed to abstain from smoking on day of procedure  Patient did not smoke on day of surgery  Induction-     Postoperative Plan- Plan for postoperative opioid use  Informed Consent- Anesthetic plan and risks discussed with patient and spouse  I personally reviewed this patient with the CRNA  Discussed and agreed on the Anesthesia Plan with the CRNA  Diego Calzada

## 2017-12-29 NOTE — PLAN OF CARE
BIRTH - VAGINAL/ SECTION     Fetal and maternal status remain reassuring during the birth process [de-identified]     Emotionally satisfying birthing experience for mother/fetus Progressing

## 2017-12-29 NOTE — PLAN OF CARE
ALTERATION IN THE BREAST     Optimize infant feeding at the breast Progressing        POSTPARTUM     Experiences normal postpartum course Progressing     Appropriate maternal -  bonding Progressing     Establishment of infant feeding pattern Progressing     Incision(s), wounds(s) or drain site(s) healing without S/S of infection Progressing

## 2017-12-30 LAB
BASOPHILS # BLD MANUAL: 0 THOUSAND/UL (ref 0–0.1)
BASOPHILS NFR MAR MANUAL: 0 % (ref 0–1)
EOSINOPHIL # BLD MANUAL: 0 THOUSAND/UL (ref 0–0.4)
EOSINOPHIL NFR BLD MANUAL: 0 % (ref 0–6)
ERYTHROCYTE [DISTWIDTH] IN BLOOD BY AUTOMATED COUNT: 13.8 % (ref 11.6–15.1)
HCT VFR BLD AUTO: 28 % (ref 34.8–46.1)
HGB BLD-MCNC: 9 G/DL (ref 11.5–15.4)
LYMPHOCYTES # BLD AUTO: 1.45 THOUSAND/UL (ref 0.6–4.47)
LYMPHOCYTES # BLD AUTO: 17 % (ref 14–44)
MCH RBC QN AUTO: 28.3 PG (ref 26.8–34.3)
MCHC RBC AUTO-ENTMCNC: 32.1 G/DL (ref 31.4–37.4)
MCV RBC AUTO: 88 FL (ref 82–98)
MONOCYTES # BLD AUTO: 0.77 THOUSAND/UL (ref 0–1.22)
MONOCYTES NFR BLD: 9 % (ref 4–12)
NEUTROPHILS # BLD MANUAL: 6.29 THOUSAND/UL (ref 1.85–7.62)
NEUTS BAND NFR BLD MANUAL: 2 % (ref 0–8)
NEUTS SEG NFR BLD AUTO: 72 % (ref 43–75)
NRBC BLD AUTO-RTO: 0 /100 WBCS
PLATELET # BLD AUTO: 152 THOUSANDS/UL (ref 149–390)
PLATELET BLD QL SMEAR: ADEQUATE
PMV BLD AUTO: 10.8 FL (ref 8.9–12.7)
RBC # BLD AUTO: 3.18 MILLION/UL (ref 3.81–5.12)
RBC MORPH BLD: NORMAL
TOTAL CELLS COUNTED SPEC: 100
WBC # BLD AUTO: 8.5 THOUSAND/UL (ref 4.31–10.16)

## 2017-12-30 PROCEDURE — 85027 COMPLETE CBC AUTOMATED: CPT | Performed by: OBSTETRICS & GYNECOLOGY

## 2017-12-30 PROCEDURE — 85007 BL SMEAR W/DIFF WBC COUNT: CPT | Performed by: OBSTETRICS & GYNECOLOGY

## 2017-12-30 RX ADMIN — DOCUSATE SODIUM 100 MG: 100 CAPSULE, LIQUID FILLED ORAL at 18:13

## 2017-12-30 RX ADMIN — Medication 1 TABLET: at 07:45

## 2017-12-30 RX ADMIN — DOCUSATE SODIUM 100 MG: 100 CAPSULE, LIQUID FILLED ORAL at 07:45

## 2017-12-30 RX ADMIN — KETOROLAC TROMETHAMINE 30 MG: 30 INJECTION, SOLUTION INTRAMUSCULAR at 06:22

## 2017-12-30 RX ADMIN — IBUPROFEN 600 MG: 600 TABLET, FILM COATED ORAL at 14:31

## 2017-12-30 RX ADMIN — IBUPROFEN 600 MG: 600 TABLET, FILM COATED ORAL at 20:46

## 2017-12-30 NOTE — PROGRESS NOTES
Progress Note - OB/GYN   Vu Case 32 y o  female MRN: [id number]  Unit/Bed#: L&D 305-01 Encounter: [id number]    Assessment:  Postop Day #1 s/p 1LTCS and 8 hour pitocin postpartum, stable    Plan:  1) Labs    Preop Hg 10 8   Postop Hg 9 0   VSS, asymptomatic   2) Ma removed yesterday evening  Patient urinated 150cc overnight  Continue to monitor voids  3) Continue routine post partum/post op care   Encourage ambulation   Encourage breastfeeding   Anticipate discharge 1/1/18    Subjective/Objective   Chief Complaint:     Post delivery  Patient is doing well this morning  She had some nausea yesterday afternoon but was able to tolerate grilled cheese and tea for dinner  Lochia WNL  Pain well controlled  Subjective:     Pain: yes, incisional, improved with meds  Tolerating PO: yes  Voiding: yes  Flatus: yes  BM: no  Ambulating: yes  Breastfeeding:  yes  Chest pain: no  Shortness of breath: no  Leg pain: no  Lochia: minimal    Objective:     Vitals: /78   Pulse 87   Temp 98 3 °F (36 8 °C) (Oral)   Resp 18   Ht 5' 3" (1 6 m)   Wt 109 kg (240 lb)   SpO2 97%   Breastfeeding?  Yes   BMI 42 51 kg/m²       Intake/Output Summary (Last 24 hours) at 12/30/17 0629  Last data filed at 12/30/17 0501   Gross per 24 hour   Intake           416 67 ml   Output             1175 ml   Net          -758 33 ml       Physical Exam:     AAOx3, NAD  CV, RRR  CTA b/l, no WRR  Soft, non-tender, non-distended, no rebound or guarding, no CVA tenderness  Uterine fundus firm and non-tender, at the umbilicus   Incision clean/dry/intact without signs of inflammation   Non tender      Lab Results   Component Value Date    WBC 8 50 12/30/2017    HGB 9 0 (L) 12/30/2017    HCT 28 0 (L) 12/30/2017    MCV 88 12/30/2017     12/30/2017                         Rimma Koch MD  12/30/2017  6:29 AM

## 2017-12-31 RX ADMIN — IBUPROFEN 600 MG: 600 TABLET, FILM COATED ORAL at 20:36

## 2017-12-31 RX ADMIN — DOCUSATE SODIUM 100 MG: 100 CAPSULE, LIQUID FILLED ORAL at 07:42

## 2017-12-31 RX ADMIN — Medication 1 TABLET: at 07:41

## 2017-12-31 RX ADMIN — IBUPROFEN 600 MG: 600 TABLET, FILM COATED ORAL at 14:09

## 2017-12-31 RX ADMIN — DOCUSATE SODIUM 100 MG: 100 CAPSULE, LIQUID FILLED ORAL at 17:55

## 2017-12-31 RX ADMIN — IBUPROFEN 600 MG: 600 TABLET, FILM COATED ORAL at 07:43

## 2017-12-31 RX ADMIN — IBUPROFEN 600 MG: 600 TABLET, FILM COATED ORAL at 02:12

## 2017-12-31 NOTE — PLAN OF CARE
Problem: POSTPARTUM  Goal: Experiences normal postpartum course  INTERVENTIONS:  - Monitor maternal vital signs  - Assess uterine involution and lochia   Outcome: Progressing    Goal: Appropriate maternal -  bonding  INTERVENTIONS:  - Identify family support  - Assess for appropriate maternal/infant bonding   -Encourage maternal/infant bonding opportunities  - Referral to  or  as needed   Outcome: Progressing    Goal: Establishment of infant feeding pattern  INTERVENTIONS:  - Assess breast/bottle feeding  - Refer to lactation as needed   Outcome: Progressing    Goal: Incision(s), wounds(s) or drain site(s) healing without S/S of infection  INTERVENTIONS  - Assess and document risk factors for skin impairment   - Assess and document dressing, incision, wound bed, drain sites and surrounding tissue  - Initiate Nutrition services consult and/or wound management as needed   Outcome: Progressing      Problem: ALTERATION IN THE BREAST  Goal: Optimize infant feeding at the breast  INTERVENTIONS:  - Latch, breast and nipple assessment  - Assess prior breast feeding history  - Hand expression of breast milk  - For cracked, bleeding and or sore nipples reassess latch, treat damaged nipple  -Educate mother on feeding cues  -Positioning/latch techniques   Outcome: Progressing

## 2017-12-31 NOTE — PROGRESS NOTES
Progress Note - OB/GYN   Rory Shaikh 32 y o  female MRN: [id number]  Unit/Bed#: L&D 305-01 Encounter: [id number]    Assessment:  POD#2 s/p Primary low transverse  section, stable    Plan:  1  Continue routine postoperative care  2  Encourage ambulation  3  Encourage breastfeeding  4  Postoperative pain control as needed    Disposition: stable, anticipate discharge POD #2    Subjective/Objective   Chief Complaint:     POD#2 s/p Primary low transverse  section    Subjective:     Pain: incisional, improved with motrin  Tolerating PO: yes  Voiding: yes  Flatus: yes  BM: no  Ambulating: yes  Breastfeeding: Breastfeeding  Chest pain: no  Shortness of breath: no  Leg pain: no  Lochia: minimal    Objective:     Vitals:  Vitals:    17 1502 17 1906 17 2213 17 0701   BP: 104/55 118/77 110/76 112/73   Pulse: 83 82 91 72   Resp: 18 16 16 18   Temp: 98 °F (36 7 °C) 98 5 °F (36 9 °C) 98 5 °F (36 9 °C) 97 9 °F (36 6 °C)   TempSrc: Oral Oral Oral    SpO2: 98% 97%  91%   Weight:       Height:           Physical Exam:     Physical Exam   Constitutional: She is oriented to person, place, and time  She appears well-developed and well-nourished  Cardiovascular: Normal rate, regular rhythm and normal heart sounds  Pulmonary/Chest: Effort normal and breath sounds normal    Abdominal: Soft  Bowel sounds are normal    Incision clean, dry, and intact  Closed with running absorbable sutures  Neurological: She is alert and oriented to person, place, and time  Uterine fundus firm and non-tender, at the umbilicus       Lab, Imaging and other studies: I have personally reviewed pertinent reports        Lab Results   Component Value Date    WBC 8 50 2017    HGB 9 0 (L) 2017    HCT 28 0 (L) 2017    MCV 88 2017     2017               Lennox Bhakta DO  17

## 2018-01-01 VITALS
HEART RATE: 80 BPM | SYSTOLIC BLOOD PRESSURE: 138 MMHG | BODY MASS INDEX: 42.52 KG/M2 | OXYGEN SATURATION: 91 % | HEIGHT: 63 IN | TEMPERATURE: 99.1 F | DIASTOLIC BLOOD PRESSURE: 74 MMHG | RESPIRATION RATE: 18 BRPM | WEIGHT: 240 LBS

## 2018-01-01 RX ORDER — IBUPROFEN 600 MG/1
600 TABLET ORAL EVERY 6 HOURS PRN
Qty: 30 TABLET | Refills: 0 | Status: SHIPPED | OUTPATIENT
Start: 2018-01-01 | End: 2018-01-25 | Stop reason: ALTCHOICE

## 2018-01-01 RX ORDER — OXYCODONE HYDROCHLORIDE AND ACETAMINOPHEN 5; 325 MG/1; MG/1
1 TABLET ORAL EVERY 4 HOURS PRN
Qty: 30 TABLET | Refills: 0 | Status: SHIPPED | OUTPATIENT
Start: 2018-01-01 | End: 2018-01-11

## 2018-01-01 RX ADMIN — DOCUSATE SODIUM 100 MG: 100 CAPSULE, LIQUID FILLED ORAL at 09:03

## 2018-01-01 RX ADMIN — Medication 1 TABLET: at 09:03

## 2018-01-01 RX ADMIN — IBUPROFEN 600 MG: 600 TABLET, FILM COATED ORAL at 10:21

## 2018-01-01 RX ADMIN — IBUPROFEN 600 MG: 600 TABLET, FILM COATED ORAL at 03:29

## 2018-01-01 NOTE — LACTATION NOTE
This note was copied from a baby's chart  Assisted mom and dad with positioning and latch  Deep latch obtained on left breast with nipple shield using football hold this morning  Sucked well and nursed for 20 minutes  Then nursed on right side using football hold this feed  Mom's milk is coming in and feels more prepared to go home today  Reminded of outpatient support available

## 2018-01-01 NOTE — PROGRESS NOTES
Progress Note - OB/GYN   Theresa Gibson 32 y o  female MRN: [id number]  Unit/Bed#: L&D 305-01 Encounter: [id number]    Assessment:  POD#3s/p Primary low transverse  section, stable    Plan:  1  Continue routine postoperative care  2  Encourage ambulation  3  Encourage breastfeeding  4  Postoperative pain control as needed     Disposition: stable, anticipate discharge today    Subjective/Objective   Chief Complaint:     POD#3 s/p Primary low transverse  section    Subjective:     Pain: incisional, improved with motrin  Tolerating PO: yes  Voiding: yes  Flatus: yes  BM: no  Ambulating: yes  Breastfeeding: Breastfeeding  Chest pain: no  Shortness of breath: no  Leg pain: no  Lochia: minimal    Objective:     Vitals:  Vitals:    17 0701 17 1450 17 2317 18 0724   BP: 112/73 121/72 125/58 138/74   Pulse: 72 73 80 80   Resp:    Temp: 97 9 °F (36 6 °C) 98 6 °F (37 °C) 98 4 °F (36 9 °C) 99 1 °F (37 3 °C)   TempSrc: Oral  Oral    SpO2: 91%      Weight:       Height:           Physical Exam:     Physical Exam   Constitutional: She appears well-developed and well-nourished  Cardiovascular: Normal rate, regular rhythm and normal heart sounds  Pulmonary/Chest: Effort normal and breath sounds normal    Abdominal: Soft  Bowel sounds are normal    Incision clean, dry, and intact  Closed with running absorbable sutures  Uterine fundus firm and non-tender, at the umbilicus       Lab, Imaging and other studies: I have personally reviewed pertinent reports  Lab Results   Component Value Date    WBC 8 50 2017    HGB 9 0 (L) 2017    HCT 28 0 (L) 2017    MCV 88 2017     2017           I have reviewed the notes, assessments performed by Dr Yuly Greenwood, I concur with his documentation of Theresa Gibson  I have examined the patient myself   Discharge instructions reviewed      Steve Brown DO  18

## 2018-01-09 LAB — PLACENTA IN STORAGE: NORMAL

## 2018-01-11 ENCOUNTER — GENERIC CONVERSION - ENCOUNTER (OUTPATIENT)
Dept: OTHER | Facility: OTHER | Age: 28
End: 2018-01-11

## 2018-01-11 NOTE — RESULT NOTES
Verified Results  (1) GLUCOSE TOLERANCE TEST, 3HR [id number] 12:04PM Lydia Hollins    Order Number: [id number]     Test Name Result Flag Reference   GLUCOSE TOLERANCE FASTING 75 mg/dL  65-99   Specimen collection should occur prior to Sulfasalazine administration due to the potential for falsely depressed results  Specimen collection should occur prior to Sulfapyridine administration due to the potential for falsely elevated results  GLUCOSE 1 HOUR 100 GM GLUC CHALLENGE-PREG  mg/dL     Specimen collection should occur prior to Sulfasalazine administration due to the potential for falsely depressed results  Specimen collection should occur prior to Sulfapyridine administration due to the potential for falsely elevated results  GLUCOSE 2 HOUR 100 GM GLUC CHALLENGE-PREG  mg/dL     Specimen collection should occur prior to Sulfasalazine administration due to the potential for falsely depressed results  Specimen collection should occur prior to Sulfapyridine administration due to the potential for falsely elevated results     GLUCOSE, 3HR (100gm Gluc Challenge Preg-Pts) 58 mg/dL L

## 2018-01-11 NOTE — PROGRESS NOTES
2017         RE: Marnie Restrepo                                 To: Ob/Gyn Care   [facility] [address]  [place]   MR#: [id number]                                  [address]   : 175 [facility], [address] [place] [address] Street: [id number]                             Fax: [phone]   (Exam #: [id number])      The LMP of this 32year old,  G1, P0-0-0-0 patient was MAR 2 2017, her   working ROSAURA is DEC 30 2017 and the current gestational age is 26 weeks 5   days by 1st Trimester Sono  A sonographic examination was performed on 2017 using real time equipment  The ultrasound examination was performed   using abdominal technique  The patient has a BMI of 41 0  Her blood   pressure today was 103/73  Earliest ultrasound found in her record: 17  8w0d  17 ROSAURA      Lucio Neely has no complaints today  She describes occasional contractions but   denies problems related to vaginal bleeding,  labor, or   hypertension  A diagnostic three-hour glucose tolerance test performed on    revealed 4 out of 4 normal values  She recently received the   influenza vaccine  Cardiac motion was observed at 140 bpm       INDICATIONS      morbid obesity      Exam Types      Level I      RESULTS      Fetus # 1 of 1   Vertex presentation   Placenta Location = Anterior   No placenta previa   Placenta Grade = I      MEASUREMENTS (* Included In Average GA)      AC              31 5 cm        35 weeks 4 days* (95%)   BPD              8 3 cm        33 weeks 2 days* (54%)   HC              30 4 cm        33 weeks 3 days* (47%)   Femur            6 2 cm        32 weeks 0 days* (40%)      Cerebellum       4 3 cm        34 weeks 6 days      HC/AC           0 97   FL/AC           0 20   FL/BPD          0 75   EFW (Ac/Fl/Hc)  2369 grams - 5 lbs 4 oz                 (66%)      THE AVERAGE GESTATIONAL AGE is 33 weeks 4 days +/- 21 days        AMNIOTIC FLUID Q1: 3 6      Q2: 4 5      Q3: 3 2      Q4: 4 2   BRANDI Total = 15 5 cm   Amniotic Fluid: Normal      ANATOMY COMMENTS      The prior US was limited with respect to evaluation of the distal right   lower extremity which is still limited on todays ultrasound due to the   constraints related to maternal morbid obesity and unfavorable fetal   position  No fetal structural abnormality is identified or suspected on   the Level I survey today  Follow up intracranial anatomy (calvarium,   cavum, lateral ventricles, and cerebellum), face, cardiac anatomy (short   axis view of the great vessels, four-chamber, outflow tract, and   three-vessel views), diaphragm, stomach, kidneys, and bladder appear   normal       IMPRESSION      Lorenzo IUP   33 weeks and 4 days by this ultrasound  (ROSAURA=DEC 24 2017)   32 weeks and 5 days by 1st Tri Sono  (ROSAURA=DEC 30 2017)   Vertex presentation   Regular fetal heart rate of 140 bpm   Anterior placenta   No placenta previa      GENERAL COMMENT      No fetal structural abnormality is identified on the Level I survey today  The amniotic fluid volume is normal  The fetal abdominal circumference   size is measured at the 95th percentile for this gestational age, with an   overall estimated fetal weight placed at the 66th percentile  Today's ultrasound findings and suggested follow-up were discussed in   detail with Flavia  Her gestational diabetes testing results were   discussed  She will return to the [facility]  in 4 weeks to assess   fetal interval growth and begin weekly non stress testing for the   indication of morbid obesity  Daily third trimester fetal kick counting   was discussed at the visit today  The face to face time, in addition to time spent discussing ultrasound   results, was approximately 10 minutes, greater than 50% of which was spent   during counseling and coordination of care  IVAN Welch M D     Maternal-Fetal Medicine   Electronically signed 11/10/17 12:31

## 2018-01-11 NOTE — RESULT NOTES
Verified Results  ([facility]) Pap Lb, rfx HPV ASCU [id number] 12:00AM Jeanna Bowman   No  of containers  Diana Mort 01 CYTYC Thin Prep Vial     Test Name Result Flag Reference   DIAGNOSIS: Comment     NEGATIVE FOR INTRAEPITHELIAL LESION AND MALIGNANCY  Specimen adequacy: Comment     Satisfactory for evaluation  Endocervical and/or squamous metaplastic  cells (endocervical component) are present  Clinician provided ICD10: Comment     Z01 419   Performed by: Crista Miller Cytotechnologist       Note: Comment     The Pap smear is a screening test designed to aid in the detection of  premalignant and malignant conditions of the uterine cervix  It is not a  diagnostic procedure and should not be used as the sole means of detecting  cervical cancer  Both false-positive and false-negative reports do occur    Comment     The HPV DNA reflex criteria were not met with this specimen result  therefore, no HPV testing was performed

## 2018-01-12 ENCOUNTER — GENERIC CONVERSION - ENCOUNTER (OUTPATIENT)
Dept: OTHER | Facility: OTHER | Age: 28
End: 2018-01-12

## 2018-01-12 ENCOUNTER — ALLSCRIPTS OFFICE VISIT (OUTPATIENT)
Dept: OTHER | Facility: OTHER | Age: 28
End: 2018-01-12

## 2018-01-13 VITALS
SYSTOLIC BLOOD PRESSURE: 103 MMHG | HEIGHT: 65 IN | DIASTOLIC BLOOD PRESSURE: 73 MMHG | WEIGHT: 239 LBS | BODY MASS INDEX: 39.82 KG/M2

## 2018-01-13 NOTE — PROGRESS NOTES
2017         RE: Belen Redd                                 To: Ob/Gyn Care   [facility]   MR#: [id number]                                  [address]  [address]   : 620 [facility], [address] [facility]    ENC: [phone]                             Fax: [phone]   (Exam #: [id number])      The LMP of this 27year old,  G1, P0-0-0-0 patient was MAR 2 2017, her   working ROSAURA is DEC 30 2017 and the current gestational age is 17 weeks 2   days by 1st Trimester Sono  A sonographic examination was performed on 2017 using real time equipment  The ultrasound examination was   performed using abdominal technique  The patient has a BMI of 34 9  Her   blood pressure today was 121/68  Earliest ultrasound found in her record: 17  8w0d  17 ROSAURA   Multiple longitudinal and transverse sections revealed a ibanez   intrauterine pregnancy with the fetus in variable presentation  The   placenta is anterior in implantation  Cardiac motion was observed at 153 bpm       INDICATIONS      first trimester genetic screening   obesity      Exam Types      sequential screen      RESULTS      Fetus # 1 of 1   Variable presentation   Fetal growth appeared normal      MEASUREMENTS (* Included In Average GA)      CRL              7 7 cm        13 weeks 4 days*   Nuchal Trans    1 40 mm      THE AVERAGE GESTATIONAL AGE is 13 weeks 4 days +/- 7 days  ANATOMY COMMENTS      Anatomic detail is limited at this gestational age  The yolk sac was not   noted  The fetal cranium appeared normal in shape and the nuchal   translucency was normal in size at 1 4 mm  The nasal bone appears to be   present  The intracranial anatomy was unremarkable  Evaluation of the   spine revealed no obvious evidence for a neural tube defect  Anatomy of   the fetal thorax appeared within normal limits  The cardiac rhythm was   regular    Within the abdomen, stomach & bladder were visualized and the   abdominal wall appeared intact  A three vessel cord appears to be present  Active movement of the fetal body & extremities was seen  There is no   suspicion of a subchorionic bleed  The placental cord insertion was   normal    There is no suspicion of a uterine myoma  Free fluid is not seen   in the posterior cul-de-sac  ADNEXA      The left ovary appeared normal and measured 2 7 x 2 1 x 1 5 cm with a   volume of 4 4 cc  The right ovary appeared normal and measured 3 2 x 3 4 x   1 8 cm with a volume of 10 2 cc  AMNIOTIC FLUID         Largest Vertical Pocket = 3 2 cm   Amniotic Fluid: Normal      IMPRESSION      Lorenzo IUP   13 weeks and 4 days by this ultrasound  (ROSAURA=DEC 28 2017)   13 weeks and 2 days by 1st Tri Sono  (ROSAURA=DEC 30 2017)   Variable presentation   Fetal growth appeared normal   Regular fetal heart rate of 153 bpm   Anterior placenta      CONSULT COMMENT      Thank you very much for your kind referral of Renee Epps to the   [facility]  in [facility] on June 26, 2017 for first trimester   ultrasound evaluation, genetic screening, and MFM consult  Car Garcia is a   47-year-old primigravida who is currently at 13-2/7 weeks gestation by an   estimated due date of December 30, 2017 which is based upon earlier first   trimester ultrasound dating  She presents for genetic screening, with   consultation performed for the indication of obesity  Car Garcia has no   complaints today, with the exception of fatigue  She denies vaginal   bleeding so far  She has not yet been screened for gestational diabetes   during this pregnancy  Car Garcia is obese, with a current BMI of 34 9  Her past medical and surgical   histories are otherwise unremarkable with the exception of wisdom teeth   removal  She currently takes no medication with the exception of a   prenatal vitamin on a daily basis and has no known drug allergy   She   denies tobacco, alcohol, or illicit drug use during the pregnancy  The   family genetic history is negative with respect to genetic abnormalities,   birth defects, or mental retardation  Her dad has hypertension  There is   no first degree family member otherwise with a diagnosis of diabetes,   hypertension, venous thromboembolism, or thyroid disease  Today's ultrasound findings and suggested follow-up were discussed in   detail with Flavia  The Sequential Screen was discussed in detail,   including the sensitivities for detection of Down syndrome, Trisomy 18,   and open neural tube defects  Definitive prenatal diagnosis is possible   only through genetic amniocentesis or CVS   David Baires underwent fingerstick   blood collection for hCG and WENDY-A to complete the initial component of   the Sequential Screen  Results should be available within one week  Follow-up multiple marker serum screening at 16-18 weeks gestation is   recommended to complete the Sequential Screen  Fetal Level II ultrasound   imaging is scheduled at about 20 weeks gestation  Maternal obesity is associated with an increased risk for adverse   pregnancy outcomes, including gestational diabetes, fetal growth   abnormalities including macrosomia, fetal structural abnormalities,   preeclampsia, venous thromboembolism, stillbirth, and increased likelihood   for  section  Early screening for gestational diabetes should be   considered, with repeat evaluation between 24 and 28 weeks gestation, if   initially negative  The face to face time, in addition to time spent discussing ultrasound   results, was approximately 15 minutes, greater than 50% of which was spent   during counseling and coordination of care  IVAN Yates M D     Maternal-Fetal Medicine   Electronically signed 17 20:43

## 2018-01-13 NOTE — RESULT NOTES
Verified Results  (1) HCG QUANT 02KVW8527 08:43AM [facility] Order Number: [id number]     Test Name Result Flag Reference   HCG QUANTITATIVE 72141 mIU/mL H <=6   Expected Ranges:     Approximate               Approximate HCG  Gestation age          Concentration ( mIU/mL)  _____________          ______________________   [place]                      HCG values  0 2-1                       5-50  1-2                           2-3                         100-5000  3-4                         500-07746  4-5                         1000-35277  5-6                         97359-590488  6-8                         63728-894850  8-12                        26548-384497     (1) HCG QUANT [id number] 01:03PM [facility] Order Number: [id number]     Test Name Result Flag Reference   HCG QUANTITATIVE 75343 mIU/mL H <=6   Expected Ranges:     Approximate               Approximate HCG  Gestation age          Concentration ( mIU/mL)  _____________          ______________________   [place]                      HCG values  0 2-1                       5-50  1-2                           2-3                         100-5000  3-4                         500-58545  4-5                         1000-44706  5-6                         96642-528965  6-8                         06883-834172  8-12                        68141-442066     (1) PROGESTERONE [id number] 01:03PM [facility] Order Number: [id number]     Test Name Result Flag Reference   PROGESTERONE 8 50 ng/mL     Patients taking DHEA-S may have falsely elevated Progesterone levels  Recommend ordering Progesterone, Lab Diomedes 911967 [facility]  PROGESTERONE:     Serum progesterone 5-25 ng/mL should not be the sole determinant in excluding pregnancy  Menstruating Females:    Follicular phase 2 041-4 95 ng/mL   Luteal phase 2 25-24 2 ng/mL   Mid-luteal phase 8 76-21 6 ng/mL Postmenopausal Females <0 200-0 901 ng/mL     Pregnant Females:   First trimester of pregnancy 11 4-41 0 ng/mL   Second trimester of pregnancy 13 8-156 ng/mL   Third trimester of pregnancy 51 4->200 ng/mL     (1) TYPE & SCREEN [id number] 01:03PM [facility] Order Number: [id number]     Test Name Result Flag Reference   ABO GROUPING AB     RH FACTOR Positive     ANTIBODY SCREEN Negative     SPECIMEN EXPIRATION DATE [id number]         Plan  Positive urine pregnancy test    · (1) HCG QUANT; Status:Complete;   Done: 93ECA2771 01:03PM   · (1) HCG QUANT; Status:Complete;   Done: 05PJU2537 08:43AM   · (1) PROGESTERONE; Status:Complete;   Done: 07LPL6805 01:03PM   · (1) TYPE & SCREEN; Status:Complete;   Done: 22BAK3972 01:03PM  the patient pregnant or have they been in the last 90 days? : Yes  the patient been transfused in the last 3 months? : No  number of units for surgical date : 0  of Surgery : [id number]

## 2018-01-14 VITALS
WEIGHT: 216.4 LBS | DIASTOLIC BLOOD PRESSURE: 68 MMHG | BODY MASS INDEX: 36.06 KG/M2 | HEIGHT: 65 IN | SYSTOLIC BLOOD PRESSURE: 121 MMHG

## 2018-01-14 VITALS
BODY MASS INDEX: 37.02 KG/M2 | HEIGHT: 65 IN | SYSTOLIC BLOOD PRESSURE: 112 MMHG | WEIGHT: 222.2 LBS | DIASTOLIC BLOOD PRESSURE: 72 MMHG

## 2018-01-14 NOTE — CONSULTS
I had the pleasure of evaluating your patient, Huong Mcdaniels  My full evaluation follows:      Chief Complaint  Here for ultrasound study      History of Present Illness  Please refer to the ultrasound report for additional information  Active Problems    1  1st trimester screening (V28 89) (Z36 9)   2  Abnormal glucose (790 29) (R73 09)   3  Encounter for gynecological examination with Papanicolaou smear of cervix (V72 31)   (Z01 419)   4  Encounter for pregnancy related examination in first trimester (V22 1) (Z34 91)   5  Encounter for pregnancy related examination in third trimester (V22 1) (Z34 93)   6  Encounter to determine fetal viability of pregnancy (V23 87) (O36 80X0)   7  Flu vaccine need (V04 81) (Z23)   8  Lactation disorder (676 90) (O92 70)   9  Maternal obesity, antepartum, first trimester (649 13,278 00) (O99 211)   10  Maternal obesity, antepartum, second trimester (649 13,278 00) (P00 986)   11  Need for Tdap vaccination (V06 1) (Z23)   12  Positive urine pregnancy test (V72 42) (Z32 01)    Surgical History    · History of Oral Surgery Tooth Extraction Turin Tooth    Family History    · No pertinent family history    · No pertinent family history    · Family history of Ovarian cancer    · Family history of malignant neoplasm of breast (V16 3) (Z80 3)    Social History    · Alcohol use (V49 89) (Z78 9)   · Never a smoker    Current Meds   1  Breast Pump Miscellaneous; USE AS DIRECTED; Therapy: 41LKE8561 to (Last Rx:10Oct2017) Ordered   2  PreNatal [facility] CAPS; Therapy: (Carli Brown) to Recorded    Allergies    1  No Known Drug Allergies    2  No Known Food Allergies   3  Pollen    Vitals   Recorded: 15QWB1172 36:25SR   Systolic 885, LUE, Sitting   Diastolic 73, LUE, Sitting   Height 5 ft 4 5 in   Weight 239 lb    BMI Calculated 40 39   BSA Calculated 2 12   Pain Scale 0     Results/Data  Exam description: level I obstetrical ultrasound   Findings: Please refer to the ultrasound report for additional information  Discussion/Summary    Please refer to the ultrasound report for additional information  The patient was counseled regarding diagnostic results, instructions for management, prognosis, impressions  Thank you very much for allowing me to participate in the care of this patient  If you have any questions, please do not hesitate to contact me        Future Appointments    Signatures   Electronically signed by : WERNER Garcia ; Nov 10 2017 12:32PM EST                       (Author)

## 2018-01-15 NOTE — PROGRESS NOTES
Chief Complaint  Pt presents today for a repeat CF blood draw d/t progenity losing specimen  Specimen resent  Active Problems    1  1st trimester screening (V28 89) (Z36)   2  Abnormal glucose (790 29) (R73 09)   3  Encounter for gynecological examination with Papanicolaou smear of cervix (V72 31)   (Z01 419)   4  Encounter for pregnancy related examination in first trimester (V22 1) (Z34 91)   5  Encounter to determine fetal viability of pregnancy (V23 87) (O36 80X0)   6  Maternal obesity, antepartum, first trimester (649 13,278 00) (O99 211)   7  Maternal obesity, antepartum, second trimester (649 13,278 00) (Z29 171)   8  Positive urine pregnancy test (V72 42) (Z32 01)    Current Meds   1  PreNatal [facility] CAPS; Therapy: (Terry Ace) to Recorded    Allergies    1  No Known Drug Allergies    2  No Known Food Allergies   3  Pollen    Future Appointments    Date/Time Provider Specialty Site   10/10/2017 08:00 AM WERNER Light  Obstetrics/Gynecology [facility]   2017 05:30 PM  Grecia Weathers  [facility]     Signatures   Electronically signed by :  Kiel Rossi; Sep 19 2017  2:23PM EST                       (Author)

## 2018-01-15 NOTE — RESULT NOTES
Verified Results  [facility] TRANSVAGINAL [id number] 04:16PM [employer] Order Number: [id number]    - Patient Instructions: To schedule this appointment, please contact Central Scheduling at [phone]  Test Name Result Flag Reference   US OB TRANSVAGINAL (Report)     FIRST TRIMESTER OBSTETRIC ULTRASOUND, COMPLETE     INDICATION: 78-year-old pregnant female  Evaluate for viability  LMP is unclear  COMPARISON: None  TECHNIQUE:  Transabdominal ultrasound of the pelvis was performed  Additional transvaginal imaging was then performed to better assess the gestation, myometrial/endometrial architecture and ovarian parenchymal detail  The study includes volumetric    sweeps and traditional still imaging technique  FINDINGS:     A single live intrauterine gestation is identified  Cardiac activity is present  Heart rate of 164 bpm      YOLK SAC: Present and normal in size and appearance  MEAN GESTATIONAL SAC SIZE: 39 mm = 9 weeks 1 days    MEAN CROWN RUMP LENGTH: 17 mm = 8 weeks 0 days    FETAL ANATOMY: Appropriate for gestational age  AMNIOTIC FLUID/SAC SHAPE: Within expected normal range  PLACENTA: The placenta is appropriate for gestational age  Tiny right-sided subchorionic hemorrhage  UTERUS/ADNEXA:    The uterus and ovaries are within normal limits  The cervix remains closed  No free fluid present  IMPRESSION:     Single live intrauterine gestation at 8 weeks 0 days (range +/- 3 days)  ROSAURA of 12/26/2017  Tiny subchorionic hemorrhage on the right  Workstation performed: [id number]     Signed by: Shmuel Chapin MD   5/22/17      OB < 14 WEEKS SINGLE OR FIRST DESTINATION LEVEL 2 [id number] 03:25PM [employer] Order Number: [id number]    - Patient Instructions: To schedule this appointment, please contact Central Scheduling at [phone]       Test Name Result Flag Reference   US OB < 14 WEEKS SINGLE OR FIRST GESTATION (Report)     FIRST TRIMESTER OBSTETRIC ULTRASOUND, COMPLETE     INDICATION: 26-year-old pregnant female  Evaluate for viability  LMP is unclear  COMPARISON: None  TECHNIQUE:  Transabdominal ultrasound of the pelvis was performed  Additional transvaginal imaging was then performed to better assess the gestation, myometrial/endometrial architecture and ovarian parenchymal detail  The study includes volumetric    sweeps and traditional still imaging technique  FINDINGS:     A single live intrauterine gestation is identified  Cardiac activity is present  Heart rate of 164 bpm      YOLK SAC: Present and normal in size and appearance  MEAN GESTATIONAL SAC SIZE: 39 mm = 9 weeks 1 days    MEAN CROWN RUMP LENGTH: 17 mm = 8 weeks 0 days    FETAL ANATOMY: Appropriate for gestational age  AMNIOTIC FLUID/SAC SHAPE: Within expected normal range  PLACENTA: The placenta is appropriate for gestational age  Tiny right-sided subchorionic hemorrhage  UTERUS/ADNEXA:    The uterus and ovaries are within normal limits  The cervix remains closed  No free fluid present  IMPRESSION:     Single live intrauterine gestation at 8 weeks 0 days (range +/- 3 days)  ROSAURA of 12/26/2017  Tiny subchorionic hemorrhage on the right  Workstation performed: [id number]     Signed by:    Yen Greene MD   5/22/17

## 2018-01-15 NOTE — PROGRESS NOTES
Chief Complaint  Pt  presents for Bloodwork, HCG, Progesterone and type & screen draw  Unknown LMP  Active Problems    1  Encounter for gynecological examination with Papanicolaou smear of cervix (V72 31)   (Z01 419)   2  Positive urine pregnancy test (V72 42) (Z32 01)    Current Meds   1  No Reported Medications Recorded    Allergies    1  No Known Drug Allergies    Plan  Positive urine pregnancy test    · (1) HCG QUANT; Status:Active - Retrospective By Protocol Authorization; Requested  for:[id number];    · (1) HCG QUANT; Status:Active - Retrospective By Protocol Authorization; Requested  for:[id number];    · (1) PROGESTERONE; Status:Active - Retrospective By Protocol Authorization; Requested for:[id number];    · (1) TYPE & SCREEN; Status:Active - Retrospective By Protocol Authorization;  Requested  for:[id number];   the patient pregnant or have they been in the last 90 days? : Yes  the patient been transfused in the last 3 months? : No  number of units for surgical date : 0  of Surgery : [id number]    Signatures   Electronically signed by : WERNER Faulkner ; May 12 2017 10:05AM EST                       (Author)

## 2018-01-15 NOTE — PROGRESS NOTES
Chief Complaint  Pt presents for early gtt  Offered pt to be seen today for prenatal appt but pt states she prefers to reschedule as she had to go to work  ms      Active Problems    1  1St trimester screening (V28 89) (Z36)   2  Encounter for gynecological examination with Papanicolaou smear of cervix (V72 31)   (Z01 419)   3  Encounter for pregnancy related examination in first trimester (V22 1) (Z34 91)   4  Encounter to determine fetal viability of pregnancy (V23 87) (O36 80X0)   5  Maternal obesity, antepartum, first trimester (649 13,278 00) (O99 211)   6  Positive urine pregnancy test (V72 42) (Z32 01)    Current Meds   1  PreNatal [facility] CAPS; Therapy: (Kirsty Yang) to Recorded    Allergies    1  No Known Drug Allergies    2  No Known Food Allergies   3  Pollen    Future Appointments    Date/Time Provider Specialty Site   2017 03:30 PM WERNER Miller  Obstetrics/Gynecology [facility]   2017 05:30 PM  Aleisha [place], [address]   Electronically signed by : Tyrone Scherer, ; Aug 22 2017  8:40AM EST                       (Author)    Electronically signed by :  WERNER Mena ; Aug 22 2017  8:58AM EST                       (Author)

## 2018-01-16 NOTE — RESULT NOTES
Verified Results  (1) GLUCOSE, 1HR PG (50gm Glu Challenge Preg-Pts) [id number] 05:19PM Chick Person     Test Name Result Flag Reference   GLUCOSE, 1 Hr  mg/dL H See Comment   Specimen collection should occur prior to Sulfasalazine administration due to the potential for falsely depressed results  Specimen collection should occur prior to Sulfapyridine administration due to the potential for falsely elevated results  Specimen collection should occur prior to Sulfasalazine administration due to the potential for falsely depressed results  Specimen collection should occur prior to Sulfapyridine administration due to the potential for falsely elevated results  Plan  Maternal obesity, antepartum, second trimester    · (1) GLUCOSE, 1HR PG (50gm Glu Challenge Preg-Pts); Status:Active;  Requested  for:[id number];

## 2018-01-17 ENCOUNTER — GENERIC CONVERSION - ENCOUNTER (OUTPATIENT)
Dept: OTHER | Facility: OTHER | Age: 28
End: 2018-01-17

## 2018-01-18 ENCOUNTER — ALLSCRIPTS OFFICE VISIT (OUTPATIENT)
Dept: OTHER | Facility: OTHER | Age: 28
End: 2018-01-18

## 2018-01-18 NOTE — PROGRESS NOTES
AUG 21 2017         RE: Sharri Garrett                                 To: Ob/Gyn Care   [facility] [address]  [place]   MR#: [id number]                                  [address]   [address]   : 175 [facility], [address] [place] [address] Street: [id number]                             Fax: [phone]   (Exam #: [id number])      The LMP of this 27year old,  G1, P0-0-0-0 patient was MAR 2 2017, her   working ROSAURA is DEC 30 2017 and the current gestational age is 22 weeks 2   days by 1st Trimester Sono  A sonographic examination was performed on AUG   21 2017 using real time equipment  The ultrasound examination was   performed using abdominal & vaginal techniques  The patient has a BMI of   36 9  Her blood pressure today was 112/72  Earliest ultrasound found in her record: 17  8w0d  17 ROSAURA         Tr Baxter has no complaints today  She reports fetal movements and denies   vaginal bleeding  Her recently completed Sequential Screen revealed a   Down syndrome risk of 1:10,000, Trisomy 18 risk of 1:10,000, and ONTD risk   of 1:720  Evaluation of the individual analyte levels reveals no increased   risk for abnormal placentation  Flavia plans to go to the lab tomorrow to   have gestational diabetes screening performed        Cardiac motion was observed at 148 bpm       INDICATIONS      fetal anatomical survey   obesity      Exam Types      LEVEL II   Transvaginal      RESULTS      Fetus # 1 of 1   Breech presentation   Fetal growth appeared normal   Placenta Location = Anterior   No placenta previa   Placenta Grade = I      MEASUREMENTS (* Included In Average GA)      AC              16 6 cm        21 weeks 3 days* (51%)   BPD              5 0 cm        21 weeks 1 day * (45%)   HC              19 2 cm        21 weeks 2 days* (48%)   Femur            3 5 cm        21 weeks 1 day * (43%)      Nuchal Fold      3 2 mm   NBL              6 6 mm      Humerus          3 6 cm        22 weeks 3 days  (70%)      Cerebellum       2 2 cm        21 weeks 2 days   Biorbit          3 2 cm        20 weeks 5 days   CisternaMagna    6 8 mm      HC/AC           1 16   FL/AC           0 21   FL/BPD          0 70   EFW (Ac/Fl/Hc)   418 grams - 0 lbs 15 oz      THE AVERAGE GESTATIONAL AGE is 21 weeks 2 days +/- 10 days  AMNIOTIC FLUID         Largest Vertical Pocket = 4 2 cm   Amniotic Fluid: Normal      CERVICAL EVALUATION      The cervix appeared normal (Ultrasound Examination)  SUPINE      Cervical Length: 4 50 cm      OTHER TEST RESULTS           Funneling?: No             Dynamic Changes?: No        Resp  To TFP?: No                      Debris?: No      ANATOMY      Head                                    Normal   Face/Neck                               Normal   Th  Cav  Normal   Heart                                   Normal   Abd  Cav  Normal   Stomach                                 Normal   Right Kidney                            Normal   Left Kidney                             Normal   Bladder                                 Normal   Abd  Wall                               Normal   Spine                                   Normal   Extrems                                 See Details   Genitalia                               Normal   Placenta                                Normal   Umbl  Cord                              Normal   Uterus                                  Normal   PCI                                     Normal      ANATOMY DETAILS      Visualized Appearing Sonographically Normal:   HEAD: (Calvarium, BPD Level, Cavum, Lateral Ventricles, Choroid Plexus,   Cerebellum, Cisterna Magna);    FACE/NECK: (Neck, Nuchal Fold, Profile,   Orbits, Nose/Lips, Palate, Face);    TH  CAV  : (Lungs, Diaphragm);       HEART: (Four Chamber View, Proximal Left Outflow, Proximal Right Outflow,   3VV, 3 Vessel Trachea, Short Axis of Greater Vessels, Ductal Arch, Aortic   Arch, Interventricular Septum, Interatrial Septum, IVC, SVC, Cardiac Axis,   Cardiac Position);    ABD  CAV : (Liver);    STOMACH, RIGHT KIDNEY, LEFT   KIDNEY, BLADDER, ABD  WALL, SPINE: (Cervical Spine, Thoracic Spine, Lumbar   Spine, Sacrum);    EXTREMS: (Lt Humerus, Rt Humerus, Lt Forearm, Rt   Forearm, Lt Hand, Rt Hand, Lt Femur, Rt Femur, Lt Low Leg, Rt Low Leg, Lt   Foot);    GENITALIA (Female), PLACENTA, UMBL  CORD, UTERUS, PCI      Suboptimally Visualized:   EXTREMS: (Rt Foot)      ADNEXA      The left ovary appeared normal and measured 3 2 x 2 5 x 1 1 cm with a   volume of 4 6 cc  The right ovary appeared normal and measured 2 1 x 2 1 x   1 4 cm with a volume of 3 2 cc  IMPRESSION      Lorenzo IUP   21 weeks and 2 days by this ultrasound  (ROSAURA=DEC 30 2017)   21 weeks and 2 days by 1st Tri Sono  (ROSAURA=DEC 30 2017)   Breech presentation   Fetal growth appeared normal   Regular fetal heart rate of 148 bpm   Anterior placenta   No placenta previa      GENERAL COMMENT      No fetal structural abnormality or ultrasound marker for aneuploidy is   identified on the Level II ultrasound study today  The right foot and   ankle are suboptimally imaged secondary to the constraints related to   unfavorable fetal position  Fetal growth and amniotic fluid volume are   normal   The placenta is normal in appearance  The cervix is normal in appearance by transvaginal sonography  The   cervical length is normal   Cervical debris is not present  Cervical   funneling is not present  Neither provocative nor dynamic change is   appreciated  Today's ultrasound findings and suggested follow-up were discussed in   detail with Flavia  We discussed that prenatal ultrasound cannot rule out   all congenital abnormalities  Her Sequential Screen results were discussed   in detail   [place] will return to the [facility]  at 32 weeks gestation   to assess fetal interval growth for the indication of maternal obesity  The face to face time, in addition to time spent discussing ultrasound   results, was approximately 10 minutes, greater than 50% of which was spent   during counseling and coordination of care  IVAN Cedeno , WERNER Simms     Maternal-Fetal Medicine   Electronically signed 08/28/17 19:17

## 2018-01-18 NOTE — MISCELLANEOUS
Message  Pt called reggie asking about the results of cystic fribrosis  I looked into it and called [facility] and spoke to Booker Stevenson at the facility stating that they lost the speciman  06/13/2016 @ 11:55am I put a note into the patients prenatal chart stating we needed to re draw the patient for the test      Called patient back about the information i got and patient started yelling at me  I got the patient to agree to come in tomorrow morning to have the blood re drawn  She will be in @ 8am for the re draw  Active Problems    1  1st trimester screening (V28 89) (Z36)   2  Abnormal glucose (790 29) (R73 09)   3  Encounter for gynecological examination with Papanicolaou smear of cervix (V72 31)   (Z01 419)   4  Encounter for pregnancy related examination in first trimester (V22 1) (Z34 91)   5  Encounter to determine fetal viability of pregnancy (V23 87) (O36 80X0)   6  Maternal obesity, antepartum, first trimester (649 13,278 00) (O99 211)   7  Maternal obesity, antepartum, second trimester (649 13,278 00) (C69 526)   8  Positive urine pregnancy test (V72 42) (Z32 01)    Current Meds   1  PreNatal [address] [facility] CAPS; Therapy: (Jimmieius Footman) to Recorded    Allergies    1  No Known Drug Allergies    2  No Known Food Allergies   3  Pollen    Plan  Abnormal glucose    · (1) CBC/PLT/DIFF; Status:Active; Requested for:48Qcb9383; Abnormal glucose, Maternal obesity, antepartum, second trimester    · (1) GLUCOSE TOLERANCE TEST, 3HR; Status:Active; Requested for:86Lqi7272;     Signatures   Electronically signed by :  WERNER Vergara ; Sep 19 2017  2:25PM EST                       (Author)

## 2018-01-22 VITALS
SYSTOLIC BLOOD PRESSURE: 112 MMHG | BODY MASS INDEX: 36.5 KG/M2 | DIASTOLIC BLOOD PRESSURE: 70 MMHG | HEIGHT: 65 IN | WEIGHT: 219.06 LBS

## 2018-01-22 VITALS
WEIGHT: 234.25 LBS | DIASTOLIC BLOOD PRESSURE: 70 MMHG | SYSTOLIC BLOOD PRESSURE: 118 MMHG | BODY MASS INDEX: 39.59 KG/M2

## 2018-01-22 VITALS
WEIGHT: 217.25 LBS | DIASTOLIC BLOOD PRESSURE: 70 MMHG | BODY MASS INDEX: 36.19 KG/M2 | SYSTOLIC BLOOD PRESSURE: 112 MMHG | HEIGHT: 65 IN

## 2018-01-22 VITALS
WEIGHT: 238.38 LBS | SYSTOLIC BLOOD PRESSURE: 104 MMHG | DIASTOLIC BLOOD PRESSURE: 68 MMHG | HEIGHT: 65 IN | BODY MASS INDEX: 39.72 KG/M2

## 2018-01-22 VITALS — WEIGHT: 227.5 LBS | SYSTOLIC BLOOD PRESSURE: 112 MMHG | DIASTOLIC BLOOD PRESSURE: 68 MMHG | BODY MASS INDEX: 38.45 KG/M2

## 2018-01-22 VITALS
BODY MASS INDEX: 38.38 KG/M2 | SYSTOLIC BLOOD PRESSURE: 118 MMHG | WEIGHT: 230.38 LBS | HEIGHT: 65 IN | DIASTOLIC BLOOD PRESSURE: 76 MMHG

## 2018-01-22 VITALS
SYSTOLIC BLOOD PRESSURE: 110 MMHG | BODY MASS INDEX: 40.78 KG/M2 | DIASTOLIC BLOOD PRESSURE: 70 MMHG | WEIGHT: 241.31 LBS

## 2018-01-23 NOTE — MISCELLANEOUS
Message  Message Free Text Note Form: Late Entry:  pt pages at ~1930 yesterday evening to report a dull ache in her left leg  She reports she was told to call if she has leg pain  Pt denies edema in either leg  SHe denies any pain with moving her leg  She denies SOB  PT reports the ache feels like an overstretched muscle  Pt advised it is unlikely she has a DVT and can follow up with her primary ob in the outpatient setting for exam and if clinical suspicion is high, she can have a duplex of the lower extremity  Pt agreeable        Signatures   Electronically signed by : WERNER Milligan ; Jan 18 2018 12:16PM EST                       (Author)

## 2018-01-23 NOTE — PROGRESS NOTES
DEC 14 2017         RE: Raquel Bravo                                 To: Ob/Gyn Care   [facility] [address]  [place]   MR#: [id number]                                  [address]   : 175 [facility], [address] [place] [address] Street: [phone]                             Fax: [phone]   (Exam #: [id number])      The LMP of this 32year old,  G1, P0-0-0-0 patient was MAR 2 2017, her   working ROSAURA is DEC 30 2017 and the current gestational age is 42 weeks 5   days by 1st Trimester Sono  A sonographic examination was performed on DEC   14 2017 using real time equipment  The ultrasound examination was   performed using abdominal technique  The patient has a BMI of 41 9  Her   blood pressure today was 132/87  Earliest ultrasound found in her record: 17  8w0d  17 ROSAURA      Cardiac motion was observed at 135 bpm       INDICATIONS      morbid obesity      Exam Types      Amniotic Fluid Index   NST      RESULTS      Fetus # 1 of 1   Vertex presentation   Placenta Location = Anterior   Placenta Grade = I      AMNIOTIC FLUID      Q1: 3 7      Q2: 5 3      Q3: 6 9      Q4: 4 8   BRANDI Total = 20 7 cm   Amniotic Fluid: Normal      The NST was reactive with no decelerations  IMPRESSION      Lorenzo IUP   37 weeks and 5 days by 1st Tri Sono  (ROSAURA=DEC 30 2017)   Vertex presentation   Regular fetal heart rate of 135 bpm   Anterior placenta      RECOMMENDATION      BRANDI: 1 Week   NST: 1 Week      IVAN Israel M D     Maternal-Fetal Medicine   Electronically signed 17 18:41

## 2018-01-23 NOTE — PROGRESS NOTES
DEC 7 2017         RE: Esme Correia                                 To: Ob/Gyn Care   [facility]   MR#: [id number]                                  [address]   : 175 [facility], [address] [place] [address] Street: [phone]                             Fax: [phone]   (Exam #: [id number])      The LMP of this 32year old,  G1, P0-0-0-0 patient was MAR 2 2017, her   working ROSAURA is DEC 30 2017 and the current gestational age is 42 weeks 5   days by 1st Trimester Sono  A sonographic examination was performed on DEC   7 2017 using real time equipment  The ultrasound examination was performed   using abdominal technique  The patient has a BMI of 41 9  Her blood   pressure today was 128/75  Earliest ultrasound found in her record: 17  8w0d  17 ROSAURA      Cardiac motion was observed at 135 bpm       INDICATIONS      morbid obesity   fetal growth      Exam Types      Level I      RESULTS      Fetus # 1 of 1   Vertex presentation   Placenta Location = Anterior   No placenta previa   Placenta Grade = II      MEASUREMENTS (* Included In Average GA)      AC              37 3 cm        41 weeks 4 days* (>95%)   BPD              9 1 cm        37 weeks 0 days* (62%)   HC              31 7 cm        35 weeks 1 day * (18%)   Femur            7 1 cm        36 weeks 1 day * (50%)      Cerebellum       5 1 cm        36 weeks 4 days      HC/AC           0 85   FL/AC           0 19   FL/BPD          0 78   EFW (Ac/Fl/Hc)  3626 grams - 7 lbs 15 oz                 (93%)      THE AVERAGE GESTATIONAL AGE is 37 weeks 4 days +/- 21 days  AMNIOTIC FLUID      Q1: 2 8      Q2: 5 1      Q3: 7 4      Q4: 7 1   BRANDI Total = 22 4 cm   Amniotic Fluid: Normal      ANATOMY COMMENTS      The prior fetal anatomic survey was limited the area of the distal right   lower extremity    These anatomic views were seen today as sonographically   normal within the inherent limitations of fetal ultrasound and the   anatomic survey is now complete  Follow up intracranial anatomy   (calvarium, cavum, lateral ventricles, and cerebellum), cardiac anatomy   (4chamber, LVOT, RVOT, and 3VV), diaphragm, stomach, kidneys, and bladder   appear normal       IMPRESSION      Lorenzo IUP   37 weeks and 4 days by this ultrasound  (ROSAURA=DEC 24 2017)   36 weeks and 5 days by 1st Tri Sono  (ROSAURA=DEC 30 2017)   Vertex presentation   Regular fetal heart rate of 135 bpm   Anterior placenta   No placenta previa      GENERAL COMMENT      Ms Jyoti Baires is here for growth in the setting of obesity  NST is to   follow  There is a single live intrauterine pregnancy with growth at the 93rd   percentile  Abdominal circumference measures >95th percentile, similar to   last measurement  No gross anomalies were identified on limited views  Amniotic fluid is within normal limits  The fetus is cephalic  Evaluation and Management:   The patient was counseled regarding the above findings  A total of 10   minutes were spent in this encounter with >50% of the time spent in   face-to-face counseling and in coordination of care  The limitations of   ultrasound were reviewed  We discussed today the implications of the sonographic estimate of fetal   weight  Although   the diagnosis of fetal macrosomia is imprecise,   prophylactic  delivery may be considered for suspected fetal   macrosomia with an estimated fetal weight of at least 5000 g in women   without diabetes and at least 4500 g in women with diabetes  However,   planned  delivery for suspected fetal macrosomia is controversial      delivery reduces but does not eliminate the risk of birth trauma   and brachial plexus injury associated with fetal macrosomia    The maternal   risks of  delivery, which include but are not limited to,   wound   infection, venous thromboembolism, bleeding, increased pain and recovery   time, adhesion formation, and placenta accreta spectrum disorders in   future pregnancies, must be considered as well  We reviewed that today's estimated weight is not above the threshold at   which  delivery is recommended for the purpose of prevention of   shoulder dystocia  We reviewed that while several risk factors for   shoulder dystocia exist (such as maternal diabetes, increasing birth   weight, maternal obesity, and excessive gestational weight gain), shoulder   dystocia is poorly predicted  We discussed that the fetus is expected to   gain weight as the pregnancy progresses  We also discussed the   limitations of ultrasound in prediction of fetal weight with advancing   gestation especially in light of obese maternal body habitus  She should continue weekly NSTs in the setting of obesity  At the conclusion of today's encounter, all questions were answered to her   satisfaction  Thank you very much for this kind referral and please do   not hesitate to contact me with any further questions or concerns  Carlos IVAN Holland M D     Maternal Fetal Medicine   Electronically signed 17 16:23

## 2018-01-23 NOTE — PROGRESS NOTES
DEC 21 2017         RE: Len Nguyen                                 To: Ob/Gyn Care   [facility]  [place]   MR#: [id number]                                  [facility]  [address]   : [facility] [address]: [phone]                             Fax: [phone]   (Exam #: [id number])      The LMP of this 32year old,  G1, P0-0-0-0 patient was MAR 2 2017, her   working ROSAURA is DEC 30 2017 and the current gestational age is 37 weeks 5   days by 1st Trimester Sono  A sonographic examination was performed on DEC   21 2017 using real time equipment  The ultrasound examination was   performed using abdominal technique  The patient has a BMI of 42 0  Her   blood pressure today was 137/78  Earliest ultrasound found in her record: 17  8w0d  17 ROSAURA      Cardiac motion was observed at 142 bpm       INDICATIONS      morbid obesity   large fetal abdominal circumference      Exam Types      Amniotic Fluid Index      RESULTS      Fetus # 1 of 1   Vertex presentation   Placenta Location = Anterior   No placenta previa   Placenta Grade = II      AMNIOTIC FLUID      Q1: 4 7      Q2: 3 8      Q3: 5 6      Q4: 5 1   BRANDI Total = 19 1 cm   Amniotic Fluid: Normal      IMPRESSION      Lorenzo IUP   38 weeks and 5 days by 1st Tri Sono  (ROSAURA=DEC 30 2017)   Vertex presentation   Regular fetal heart rate of 142 bpm   Anterior placenta   No placenta previa      GENERAL COMMENT      Ms Odell Rehman is here for fluid and position check  Please see NST   interpreted under separate cover  Amniotic fluid is within normal limits  The fetus is cephalic  She should continue  surveillance as previously scheduled without   modification of schedule based on normal findings today  Thank you very much for this kind referral and please do not hesitate to   contact me with any further questions or concerns        Jim Davidson WERNER [place] Mai     Maternal Fetal Medicine   Electronically signed 12/21/17 19:03

## 2018-01-23 NOTE — RESULT NOTES
Verified Results  (1) CBC/PLT/DIFF [id number] 01:11PM Edie Pena     Test Name Result Flag Reference   WBC COUNT 8 90 Thousand/uL  4 31-10 16   RBC COUNT 3 80 Million/uL L 3 81-5 12   HEMOGLOBIN 11 1 g/dL L 11 5-15 4   HEMATOCRIT 33 5 % L 34 8-46  1   MCV 88 fL  82-98   MCH 29 2 pg  26 8-34 3   MCHC 33 1 g/dL  31 4-37 4   RDW 13 1 %  11 6-15 1   MPV 10 8 fL  8 9-12 7   PLATELET COUNT 787 Thousands/uL  149-390   nRBC AUTOMATED 0 /100 WBCs     NEUTROPHILS RELATIVE PERCENT 70 %  43-75   LYMPHOCYTES RELATIVE PERCENT 17 %  14-44   MONOCYTES RELATIVE PERCENT 9 %  4-12   EOSINOPHILS RELATIVE PERCENT 4 %  0-6   BASOPHILS RELATIVE PERCENT 0 %  0-1   NEUTROPHILS ABSOLUTE COUNT 6 15 Thousands/? ??L  1 85-7 62   LYMPHOCYTES ABSOLUTE COUNT 1 47 Thousands/? ??L  0 60-4 47   MONOCYTES ABSOLUTE COUNT 0 83 Thousand/? ??L  0 17-1 22   EOSINOPHILS ABSOLUTE COUNT 0 38 Thousand/? ??L  0 00-0 61   BASOPHILS ABSOLUTE COUNT 0 02 Thousands/? ??L  0 00-0 10   This is a patient instruction: This test is non-fasting  Please drink two glasses of water morning of bloodwork

## 2018-01-23 NOTE — PROGRESS NOTES
DEC 28 2017         RE: Cindy Jovel                                 To: Ob[facility]Gyn Care   [facility] [place] [facility] [place]  [facility]   MR#: [id number]                                  [address]   : [address], [address] [facility]    ENC: [id number]                             Fax: [phone]   (Exam #: [id number])      The LMP of this 32year old,  G1, P0-0-0-0 patient was MAR 2 2017, her   working ROSAURA is DEC 30 2017 and the current gestational age is 43 weeks 5   days by 1st Trimester Sono  A sonographic examination was performed on DEC   28 2017 using real time equipment  The ultrasound examination was   performed using abdominal technique  The patient has a BMI of 41 4  Her   blood pressure today was 135/80  Earliest ultrasound found in her record: 17  8w0d  17 ROSAURA      Cardiac motion was observed at 148 bpm       INDICATIONS      morbid obesity   large fetal abdominal circumference      Exam Types      Amniotic Fluid Index      RESULTS      Fetus # 1 of 1   Vertex presentation   Placenta Location = Anterior   No placenta previa   Placenta Grade = II      AMNIOTIC FLUID      Q1: 4 9      Q2: 3 2      Q3: 4 5      Q4: 0 7   BRANDI Total = 13 3 cm   Amniotic Fluid: Normal      IMPRESSION      Lorenzo IUP   39 weeks and 5 days by 1st Tri Sono  (ROSAURA=DEC 30 2017)   Vertex presentation   Regular fetal heart rate of 148 bpm   Anterior placenta   No placenta previa      GENERAL COMMENT      Ms Arpit Valencia is here for fluid and position check in the setting of BMI   above 40  Amniotic fluid is within normal limits  The fetus is cephalic  She is scheduled for primary  delivery tomorrow per her prenatal   chart  Thank you very much for this kind referral and please do not hesitate to   contact me with any further questions or concerns  IVAN Smith M D     Maternal Fetal Medicine Electronically signed 12/28/17 18:55

## 2018-01-24 VITALS
HEART RATE: 93 BPM | BODY MASS INDEX: 40.75 KG/M2 | DIASTOLIC BLOOD PRESSURE: 78 MMHG | WEIGHT: 244.6 LBS | HEIGHT: 65 IN | SYSTOLIC BLOOD PRESSURE: 137 MMHG

## 2018-01-24 VITALS
DIASTOLIC BLOOD PRESSURE: 75 MMHG | SYSTOLIC BLOOD PRESSURE: 128 MMHG | WEIGHT: 245 LBS | BODY MASS INDEX: 41.4 KG/M2 | WEIGHT: 244 LBS | DIASTOLIC BLOOD PRESSURE: 78 MMHG | SYSTOLIC BLOOD PRESSURE: 120 MMHG | BODY MASS INDEX: 40.65 KG/M2 | HEIGHT: 65 IN

## 2018-01-24 VITALS
WEIGHT: 241.2 LBS | SYSTOLIC BLOOD PRESSURE: 135 MMHG | BODY MASS INDEX: 40.19 KG/M2 | HEIGHT: 65 IN | DIASTOLIC BLOOD PRESSURE: 80 MMHG

## 2018-01-24 VITALS
WEIGHT: 217.31 LBS | BODY MASS INDEX: 36.21 KG/M2 | HEIGHT: 65 IN | DIASTOLIC BLOOD PRESSURE: 82 MMHG | SYSTOLIC BLOOD PRESSURE: 110 MMHG

## 2018-01-24 VITALS — WEIGHT: 143 LBS | BODY MASS INDEX: 24.17 KG/M2 | DIASTOLIC BLOOD PRESSURE: 74 MMHG | SYSTOLIC BLOOD PRESSURE: 116 MMHG

## 2018-01-24 VITALS — HEART RATE: 106 BPM

## 2018-01-24 VITALS
HEIGHT: 65 IN | WEIGHT: 246.5 LBS | DIASTOLIC BLOOD PRESSURE: 74 MMHG | SYSTOLIC BLOOD PRESSURE: 122 MMHG | BODY MASS INDEX: 41.07 KG/M2

## 2018-01-24 VITALS — WEIGHT: 245 LBS | BODY MASS INDEX: 41.4 KG/M2 | DIASTOLIC BLOOD PRESSURE: 64 MMHG | SYSTOLIC BLOOD PRESSURE: 116 MMHG

## 2018-01-24 VITALS
DIASTOLIC BLOOD PRESSURE: 87 MMHG | BODY MASS INDEX: 40.69 KG/M2 | SYSTOLIC BLOOD PRESSURE: 132 MMHG | HEART RATE: 106 BPM | WEIGHT: 244.2 LBS | HEIGHT: 65 IN

## 2018-01-24 VITALS
HEART RATE: 76 BPM | TEMPERATURE: 98.7 F | SYSTOLIC BLOOD PRESSURE: 120 MMHG | DIASTOLIC BLOOD PRESSURE: 82 MMHG | RESPIRATION RATE: 17 BRPM

## 2018-01-24 VITALS — HEART RATE: 102 BPM

## 2018-01-25 ENCOUNTER — OFFICE VISIT (OUTPATIENT)
Dept: POSTPARTUM | Facility: CLINIC | Age: 28
End: 2018-01-25

## 2018-01-25 DIAGNOSIS — Z71.89 ENCOUNTER FOR BREAST FEEDING COUNSELING: Primary | ICD-10-CM

## 2018-01-25 NOTE — PATIENT INSTRUCTIONS
Plan:  Keep doing what your doing  It's working well! The sensation of "bubbles" you are feeling is likely your letdown  Please call if new concerning symptoms develop

## 2018-01-25 NOTE — PROGRESS NOTES
Informant/Relationship: mother    Discussion of General Lactation Issues: Albert Stallworth feels feedings are getting better  Her pumping output is up  Masha Garcia seems to be growing  A new development is that Albert Stallworth feels a sensation of "bubbles" in her breast when nursing Dyan  The "bubbles" travel both directions from axilla to nipple and back  No unusual symptoms noted on the surface of the breast or nipples  Mom:  Breast: Normal  Nipple Assessment in General: Normal: elongated/eraser, no discoloration and no damage noted  Mother's Awareness of Feeding Cues                 Recognizes: Yes                  Verbalizes: Yes  Support System: FOB, MGM, PGM  History of Breastfeeding: none  Changes/Stressors/Violence: -DV  Concerns/Goals: Albert Stallworth has concerns about the sensations in her breasts while nursing or pumping            Interval Breastfeeding History:    Frequency of breast feeding: On demand at least 10 times a day  Does mother feel breastfeeding is effective: yes  Does infant appear satisfied after nursing:yes  Stooling pattern normal:7-14 stools daily, yellow seedy stools  Urinating frequently:wet diaper with every feeding  Using shield or shells:no      [place] Latch:  Efficiency:               Lips Flanged: Yes              Depth of latch:deep              Audible Swallow: Yes              Visible Milk: Yes              Wide Open/ Asymmetrical: Yes              Suck Swallow Cycle: sustained and well coordinated  Nipple Assessment after latch: Normal: elongated/eraser, no discoloration and no damage noted  Latch Problems: none Dyan transferred 90gm of milk during the feeding  Position:  Infant's Ergonomics/Body               Body Alignment: No, initially rotated away from her mother               Head Supported: Yes               Close to Mom's body/ Lifted/ Supported: Yes               Mom's Ergonomics/Body: No                           Supported:  Yes                           Sitting Back: No Brings Baby to her breast: No  Positioning Problems: Flavia needed to be reminded to sit comfortably in the chair with her body and the baby's supported and to bring the baby to her rather than bringing her breast to the baby  Infant:  Behaviors: Fussy  Color: Pink  Weight: [email or web address]    Problems with infant: none      Alternative/Artificial Feedings:   Bottle: Yes, paced bottle feeding once a day            Breast Milk:                      Amount: 2 ounces            Frequency Q day  Elimination Problems: none      Equipment:  Pump            Type: Evenflo            Frequency of Use: at least once a day  Expresses about 1-2 ounces each breast    Equipment Problems: none        Education:  Reviewed Positioning for Dyad: Remember to keep Dyan's ear, shoulder and hip in good alignment with her body turned towards yours        Plan:  Keep doing what your doing  It's working well! The sensation of "bubbles" you are feeling is likely your letdown  Please call if new concerning symptoms develop

## 2018-02-06 NOTE — PROGRESS NOTES
I have reviewed the notes, assessments, and/or procedures performed by Juli Floyd RN  I concur with her documentation of Lance Sommer

## 2018-02-14 ENCOUNTER — POSTPARTUM VISIT (OUTPATIENT)
Dept: OBGYN CLINIC | Facility: MEDICAL CENTER | Age: 28
End: 2018-02-14

## 2018-02-14 VITALS — BODY MASS INDEX: 35.54 KG/M2 | WEIGHT: 210.3 LBS | SYSTOLIC BLOOD PRESSURE: 112 MMHG | DIASTOLIC BLOOD PRESSURE: 80 MMHG

## 2018-02-14 DIAGNOSIS — Z98.891 S/P CESAREAN SECTION: ICD-10-CM

## 2018-02-14 PROBLEM — O99.211 MATERNAL OBESITY, ANTEPARTUM, FIRST TRIMESTER: Status: RESOLVED | Noted: 2017-06-28 | Resolved: 2018-02-14

## 2018-02-14 PROBLEM — Z3A.39 39 WEEKS GESTATION OF PREGNANCY: Status: RESOLVED | Noted: 2017-12-29 | Resolved: 2018-02-14

## 2018-02-14 PROCEDURE — 99024 POSTOP FOLLOW-UP VISIT: CPT | Performed by: OBSTETRICS & GYNECOLOGY

## 2018-02-14 NOTE — PROGRESS NOTES
OB POSTPARTUM VISIT PROGRESS NOTE  Date of Encounter: 2018    Maty Aguirre    : 1990  (32 y o )  MR: [id number]    Subjective   Fani Banks is in for her postpartum visit  She is now   She delivered by primary  section  She's generally doing well, denies current pain or bleeding issues, and has no significant depression issues  She is breast feeding exclusively  We discussed all appropriate contraceptive options and she chooses None  Objective   EXAM:  GENERAL: alert, well appearing, and in no distress  VITALS: Blood pressure 112/80, weight 95 4 kg (210 lb 4 8 oz), currently breastfeeding  BMI: Body mass index is 35 54 kg/m²    BREASTS: Deferred/ breastfeeding at time of visit   ABDOMEN: soft depressible non tender   EXTREMITIES: All normal   Assessment/Plan   Diagnoses and all orders for this visit:    6 weeks postpartum follow-up    S/P  section    does not desire birth control at this time , will return for yearly visit      Stefany Cortez MD

## 2018-03-07 NOTE — PROGRESS NOTES
Education  Baby & Me Education 1st Trimester:   First Trimester Education provided:   benefits of breastfeeding, importance of exclusive breastfeeding, early initiation of breastfeeding, exclusive breastfeeding for the first 6 months and Pregnancy Essentials Reference Guide given   The patient is planning on breastfeeding  Prenatal education provided by: René Alfaro   Electronically signed by :  BELEN Alfaro; Jun 9 2017 12:08PM EST                       (Author)

## 2018-03-07 NOTE — PROGRESS NOTES
History of Present Illness  Lactation Visit Follow-up:   Informant/Relationship: Mother  Discussion of General Lactation Issues:   Dyan's weight was reassuring at her weight check earlier this week  Breast feeding is better according to [facility]  Discussion/Summary  Observation of and Assistance With Nursing: Mother appears comfortable with baby in arms: Yes  Cross cradle  Infant appears comfortable: Yes  Infant latches easily: Yes  Infant lips flanged over aereolat: Yes  Infant jaw movement apropriate without hollowing of cheeks: Yes  No clicking audible: No   Rare clicking noted  Latch is assymetric: Yes  Audible swallow appreciated: Yes  Occasional swallows heard  Sucking transitions from stimulation to nutritive to non-nutritive or release: Yes  Briefly  Infant sustains effective nursing for at least 5 minutes: No   Belen Pelayo had a few short sucking bursts but mostly NNS  Infant appears satisfied after release: No     Nipple shield in use: No     Additional Observation of and Assistance with Nursing Dyan needs a lot of encouragement to nurse effectively  It is unclear if the issue is Dyan's sucking ability or Flavia's supply or a combination of both   History  Interval Breastfeeding History:   Frequency of breast feeding   16 times yesterday  Does mother feel breastfeeding is effective? Yes  Does infant appear satisfied after nursing? Yes  Stooling pattern normal?  9 yellow stools in the last 24 hours  Urinating frequently 9 wet diapers in the last 24 hours  Using shield or shells  No    Pain with latch No    Cracked or crusty nipples No    Pain after or between nursing No    Does nipple klaudia after infant releases No    Pain throughout nursing  No    If milk is expressed, how, how much, and how often? Evenflo pump once a day  Expresses 60-100ml each session  If supplement is used, what is given, how and how frequently? None  Pacifier use?   No  Assessment    1  Encounter for breast feeding counseling (V65 49) (Z71 89)   2  Lactation problem (676 90) (O92 70)  Infant Assessment Pertinent to Breastfeeding:   Level of alertness  Crying  Jaundice  No    Tone  Appropriate for age  Ora-pharynx/tongue  Normal assessment  Rooting reflex present  Yes  Suck reflex present  Yes  Maternal Assessment:   Flat or inverted nipples  No    Breast erythema  No    Nipple discoloration  No    Cracking of nipple  No    Crusting of nipple  No    Breast morphology  Breast still relatively soft  Tenderness to palpation (include location)  No    Engorgement  No         Diagnosis (pertinent to lactation):   Encounter for breast feeding counseling, weight check  Plan  Plan for Breastfeeding:   Reassurance and support given  Increase frequency of expression  Pump a few times a day if you can to help increase your supply and to have milk available for supplementation  Supplementation recommended  Expressed breast milk via SNS or paced bottle feeding 2-3 times a day  Use of pump demonstrated  Follow up/next visit: One week  Time spent: 60 minutes  Additional plan for breastfeeding: Talk with Flavia's OB about possible medical causes of decreased supply  Continue to feed on demand at early feeding cues  Pay close attention to position and latch  Use massage and breast compressions during feedings to help Dyan get more milk        Signatures   Electronically signed by : Myra Olivarez RN; Jan 18 2018  5:11PM EST                       (Author)    Electronically signed by : WERNER Richardson ; Feb  3 2018 10:51AM EST                       (Author)

## 2018-03-07 NOTE — PROGRESS NOTES
History of Present Illness  Lactation Visit Intake:   Informant/Relationship: Mother and Father  Birth Weight: 3912gm  Last Office Visit Weight: 3 77kg 2018  Today's Weight: 3770gm  Discussion of General Lactation Issues:   Dorothea Yanes had trouble with breast feeding since the beginning  She had been using a nipple shield due to painful latch  She stopped using it a few days ago  The concern now is that Pao Vera has not gained weight in a week  Past Medical History  Past Medical History (Pertinent to Lactation Issues):   Diabetes: No     Endocrine Problems: No     Hypertenson: No     Cancer: No     Anemia: No     Breast Surgery: No     Nutritional issues: No     Alcohol Use: No     Cigarette User: No     Allergies: No     Mental History Disorders: No     Cardiac Disease: No         History   History:   Fertility Problems: No     Breast changes during pregnancy: Yes   some minor changes   : No   elective   No medical reason given   Full Term: Yes   labor: No     First nursing/attempt < 1 hour after birth: Yes  Skin to skin following delivery: No   due to   STS delayed about 30-60 minutes   Breast changes after delivery: Yes   Rooming in: Yes   Blood sugar issues: No     NICU stay: No     Jaundice: No     Phototherapy: No     Supplement given: No     Breastmilk expressed: Yes    pumped once   Breastfeeding History Since Discharge:   Nursing every 2-3 hours: Yes  On demand  Breasts feel troy prior to nursing, softer after: Yes  Frequent Urination: Yes   >6 wet diapers daily   Stooling: Yes    several yellow seedy stools daily   Engorgement: No     Sore nipples with latch: No     Sore nipples throughout nursing: No     Cracking of nipples: No     Crusting of nipples: No     Breast pain: No     Pacifier used: No     Milk expression: Yes   Evenflo occasionally   expresses about 15ml each breast    Supplementation: No     Use of nipple shields for nursing or shells between nursing: Yes  Discussion/Summary  Observation of and Assistance With Nursing: Mother appears comfortable with baby in arms: Yes  [place]  Infant appears comfortable: No   Dyan's body was initially rotated away from [facility] and her head was pushed forward  Infant latches easily: Yes  Infant lips flanged over aereolat: Yes  Infant jaw movement apropriate without hollowing of cheeks: Yes  No clicking audible: Yes  Latch is assymetric: Yes  Audible swallow appreciated: Yes  Only rare swallows noted on the L breast  1 sucking burst with swallows while nursing on the R breast    Sucking transitions from stimulation to nutritive to non-nutritive or release: Yes  Infant sustains effective nursing for at least 5 minutes: No     Infant appears satisfied after release: Yes   Nipple shield in use: No     Additional Observation of and Assistance with Nursing The feeding took a lot of support in regards to position and latch  Once latch was achieved, suckling was inconsistent, only one good suckling burst noted  [facility] also did breast massage and compression to help get more milk to Dyan but [facility] quickly became very sleepy  She did transfer 45gm of milk during the feeding  Assessment    1  Encounter for breast feeding counseling (V65 49) (Z71 89)   2  Lactation problem (882 90) (O92 70)  Infant Assessment Pertinent to Breastfeeding:   Level of alertness  Quiet alert  Jaundice  No    Tone  Appropriate for age  Ora-pharynx/tongue  Normal assessment  Rooting reflex present  Yes  Suck reflex present  Yes  Lungs  CTA  Heart  RRR, no murmur  Abdomen  Soft, flat, normal bowel sounds  Neck  Full ROM  Maternal Assessment:   Flat or inverted nipples  No    Breast erythema  No    Nipple discoloration  No    Cracking of nipple  No    Crusting of nipple  No    Breast morphology  Appropriate anatomical appearance  Soft, not full of milk  Tenderness to palpation (include location)  No    Engorgement  No    Lungs  CTA  Heart  RRR, no murmur  Extremities  Resolving edema  Diagnosis (pertinent to lactation):   Encounter for breast feeding counseling, breast feeding problem in   Plan  Plan for Breastfeeding:   Reassurance and support given  Reviewed early signs of hunger, including tensing of hands and shoulders - no need to wait for open eyes  Reviewed normal sucking patterns: transition from stimulation to nutritive to release or non-nutritive  Reviewed normal nursing pattern: infant should nurse for at least 5 minutes or until releases on own  Demonstrated position to hold infant: Cross cradle, football  Demonstrated ways to hold breast to facilitate latch: simple lift or "sandwich" v "taco"  Discussed difference in sensation of non-nutritive v  nutritive sucking  Galactogogues discussed (note if fenugeek or mother's milk tea recommended  Flavia asked about galactogogues  Questions answered  Hand out given  Increase frequency of expression  Pumping can help increase your supply  Manual expression demonstrated  Hand out given  Breastmilk storage discussed: 6-8h at room tem, 2-3m freezer  Do not add fresh milk to frozen  Hand out given  Follow up/next visit: One week  Time spent: 90minutes  Additional plan for breastfeeding: Talk with Flavia's OB about her recent decrease in supply  Early on her supply appeared sufficient but today seems decreased        Signatures   Electronically signed by : Virgil Newton RN; 2018  2:18PM EST                       (Author)    Electronically signed by : Merrily Lefort, M D ; 2018  6:58PM EST                       (Author)

## 2018-03-16 ENCOUNTER — OFFICE VISIT (OUTPATIENT)
Dept: OBGYN CLINIC | Facility: MEDICAL CENTER | Age: 28
End: 2018-03-16
Payer: COMMERCIAL

## 2018-03-16 VITALS — SYSTOLIC BLOOD PRESSURE: 102 MMHG | WEIGHT: 207.6 LBS | BODY MASS INDEX: 35.08 KG/M2 | DIASTOLIC BLOOD PRESSURE: 78 MMHG

## 2018-03-16 DIAGNOSIS — Z01.419 ENCOUNTER FOR GYNECOLOGICAL EXAMINATION: Primary | ICD-10-CM

## 2018-03-16 PROCEDURE — S0612 ANNUAL GYNECOLOGICAL EXAMINA: HCPCS | Performed by: OBSTETRICS & GYNECOLOGY

## 2018-03-16 NOTE — PROGRESS NOTES
ASSESSMENT & PLAN: Lacy Lucero is a 32 y o   with normal gynecologic exam     1   Routine well woman exam done today  2  Pap:  The patient's Pap was not done today  Current ASCCP Guidelines reviewed  3  The following were reviewed in today's visit: breast self exam, use and side effects of OCPs, family planning choices, exercise and healthy diet  4  Currently breastfeeding : encouraged continuing     CC: Annual Gynecologic Examination    HPI: Lacy Lucero is a 32 y o  Terry Nguyen who presents for annual gynecologic examination  She has the following concerns:  None     Health Maintenance:    She wears her seatbelt routinely  She does perform regular monthly self breast exams  She feels safe at home  History reviewed  No pertinent past medical history  Past Surgical History:   Procedure Laterality Date    [place]  DELIVERY ONLY N/A 2017    Procedure:  SECTION (); Surgeon: Yamilex Carolina MD;  [facility];  Service: Obstetrics    WISDOM TOOTH EXTRACTION         OB/Gyn History:      OB History      Para Term  AB Living    1 1 1     1    SAB TAB Ectopic Multiple Live Births          0 1          Family History   Problem Relation Age of Onset    No Known Problems Mother     No Known Problems Father     Ovarian cancer Paternal Grandmother     Breast cancer Paternal Aunt        Social History:  Social History     Social History    Marital status: /Civil Union     Spouse name: N/A    Number of children: N/A    Years of education: N/A     Occupational History    Not on file       Social History Main Topics    Smoking status: Never Smoker    Smokeless tobacco: Never Used    Alcohol use No      Comment: alcohol use - per allscripts     Drug use: No    Sexual activity: Yes     Partners: Male     Other Topics Concern    Not on file     Social History Narrative    No narrative on file       No Known Allergies      Current Outpatient Prescriptions:     HOMEOPATHIC PRODUCTS PO, Take 2 tablets by mouth daily, Disp: , Rfl:     Prenatal Vit-Fe Fumarate-FA (PRENATAL VITAMIN PO), Take by mouth, Disp: , Rfl:     Review of Systems:  Constitutional :no fever, feels well, no tiredness, no recent weight gain or loss  ENT: no ear ache, no loss of hearing, no nosebleeds or nasal discharge, no sore throat or hoarseness  Cardiovascular: no complaints of slow or fast heart beat, no chest pain, no palpitations, no leg claudication or lower extremity edema  Respiratory: no complaints of shortness of shortness of breath, no MAGANA  Breasts:no complaints of breast pain, breast lump, or nipple discharge  Gastrointestinal: no complaints of abdominal pain, constipation,nausea, vomiting, or diarrhea or bloody stools  Genitourinary : no complaints of dysuria, incontinence, pelvic pain, no dysmenorrhea, vaginal discharge or abnormal vaginal bleeding and as noted in HPI  Integumentary: no complaints of skin rash or lesion, itching or dry skin  Neurological: no complaints of headache, no confusion, no numbness or tingling, no dizziness or fainting    Objective        /78   Wt 94 2 kg (207 lb 9 6 oz)   Breastfeeding? Yes   BMI 35 08 kg/m²   General:   appears stated age, cooperative, alert normal mood and affect   Neck: Neck: normal, supple,trachea midline, no masses   Heart: regular rate and rhythm, S1, S2 normal, no murmur, click, rub or gallop   Lungs: clear to auscultation bilaterally   Breasts: normal appearance, no masses or tenderness, Normal to palpation without dominant masses   Abdomen: soft, non-tender, without masses or organomegaly   Vulva: normal   Vagina: normal vagina, no discharge, exudate, lesion, or erythema   Urethra: normal   Cervix: Normal, no discharge  Nontender     Uterus: normal size, contour, position, consistency, mobility, non-tender   Adnexa: no mass, fullness, tenderness

## 2019-07-14 ENCOUNTER — OFFICE VISIT (OUTPATIENT)
Dept: URGENT CARE | Facility: CLINIC | Age: 29
End: 2019-07-14
Payer: COMMERCIAL

## 2019-07-14 VITALS
WEIGHT: 226.2 LBS | RESPIRATION RATE: 20 BRPM | SYSTOLIC BLOOD PRESSURE: 116 MMHG | HEIGHT: 64 IN | OXYGEN SATURATION: 100 % | HEART RATE: 99 BPM | TEMPERATURE: 98 F | DIASTOLIC BLOOD PRESSURE: 68 MMHG | BODY MASS INDEX: 38.62 KG/M2

## 2019-07-14 DIAGNOSIS — H60.392 OTHER INFECTIVE ACUTE OTITIS EXTERNA OF LEFT EAR: Primary | ICD-10-CM

## 2019-07-14 PROCEDURE — 99213 OFFICE O/P EST LOW 20 MIN: CPT | Performed by: FAMILY MEDICINE

## 2019-07-14 RX ORDER — AMOXICILLIN AND CLAVULANATE POTASSIUM 875; 125 MG/1; MG/1
1 TABLET, FILM COATED ORAL EVERY 12 HOURS SCHEDULED
Qty: 20 TABLET | Refills: 0 | Status: SHIPPED | OUTPATIENT
Start: 2019-07-14 | End: 2019-07-24

## 2019-07-14 NOTE — PATIENT INSTRUCTIONS
Augmentin and Cortisporin otic drops as directed  Ibuprofen or Tylenol as needed for discomfort  Follow-up with PCP in 3-5 days if symptoms not improving

## 2019-07-14 NOTE — PROGRESS NOTES
[facility]        NAME: Tiana Ray is a 25 y o  female  : 1990    MRN: [id number]  DATE: 2019  TIME: 12:16 PM    Assessment and Plan   Other infective acute otitis externa of left ear [H60 392]  1  Other infective acute otitis externa of left ear  amoxicillin-clavulanate (AUGMENTIN) 875-125 mg per tablet    neomycin-polymyxin-hydrocortisone (CORTISPORIN) otic solution         Patient Instructions   Patient Instructions   Augmentin and Cortisporin otic drops as directed  Ibuprofen or Tylenol as needed for discomfort  Follow-up with PCP in 3-5 days if symptoms not improving        Proceed to  ER if symptoms worsen  Chief Complaint     Chief Complaint   Patient presents with   Cuong Lujan     patient reports she has had left ear pain since Tuesday, swims often  applied swimmers ear drops without releif  History of Present Illness       Patient presents with 6 day history of left ear pain that is getting worse, she initially had discomfort and yesterday started having sharp pain  She denies any other symptoms, no URI symptoms no sinus congestion or cough no fever no chills      Review of Systems   Review of Systems   Constitutional: Negative  HENT: Positive for ear pain  Negative for congestion, ear discharge, sneezing and sore throat  Eyes: Negative  Respiratory: Negative  Cardiovascular: Negative  Gastrointestinal: Negative            Current Medications       Current Outpatient Medications:     Prenatal Vit-Fe Fumarate-FA (PRENATAL VITAMIN PO), Take by mouth, Disp: , Rfl:     amoxicillin-clavulanate (AUGMENTIN) 875-125 mg per tablet, Take 1 tablet by mouth every 12 (twelve) hours for 10 days, Disp: 20 tablet, Rfl: 0    HOMEOPATHIC PRODUCTS PO, Take 2 tablets by mouth daily, Disp: , Rfl:     neomycin-polymyxin-hydrocortisone (CORTISPORIN) otic solution, Administer 4 drops into the left ear every 6 (six) hours, Disp: 10 mL, Rfl: 0    Current Allergies Allergies as of 2019    (No Known Allergies)            The following portions of the patient's history were reviewed and updated as appropriate: allergies, current medications, past family history, past medical history, past social history, past surgical history and problem list      History reviewed  No pertinent past medical history  Past Surgical History:   Procedure Laterality Date    [place]  DELIVERY ONLY N/A 2017    Procedure:  SECTION (); Surgeon: Lennox Guard, MD;  Location: [facility];  Service: Obstetrics    WISDOM TOOTH EXTRACTION         Family History   Problem Relation Age of Onset    No Known Problems Mother     No Known Problems Father     Ovarian cancer Paternal Grandmother     Breast cancer Paternal Aunt          Medications have been verified  Objective   /68   Pulse 99   Temp 98 °F (36 7 °C)   Resp 20   Ht 5' 4" (1 626 m)   Wt 103 kg (226 lb 3 2 oz)   LMP 2019   SpO2 100%   BMI 38 83 kg/m²        Physical Exam     Physical Exam   Constitutional: She appears well-developed and well-nourished  No distress  HENT:   Right Ear: External ear normal    Mouth/Throat: Oropharynx is clear and moist  No oropharyngeal exudate  Left TM mildly erythematous, a canal surrounding the TM and extending towards the meatus diffusely erythematous edematous and with mild discharge   Eyes: Conjunctivae are normal    Neck: Neck supple  Cardiovascular: Normal rate, regular rhythm and normal heart sounds  Pulmonary/Chest: Effort normal and breath sounds normal  No respiratory distress  Lymphadenopathy:     She has no cervical adenopathy

## 2019-12-13 ENCOUNTER — OFFICE VISIT (OUTPATIENT)
Dept: URGENT CARE | Facility: CLINIC | Age: 29
End: 2019-12-13
Payer: COMMERCIAL

## 2019-12-13 VITALS
HEIGHT: 64 IN | BODY MASS INDEX: 38.17 KG/M2 | SYSTOLIC BLOOD PRESSURE: 142 MMHG | TEMPERATURE: 97.2 F | DIASTOLIC BLOOD PRESSURE: 70 MMHG | RESPIRATION RATE: 16 BRPM | WEIGHT: 223.6 LBS | HEART RATE: 79 BPM | OXYGEN SATURATION: 97 %

## 2019-12-13 DIAGNOSIS — R31.9 URINARY TRACT INFECTION WITH HEMATURIA, SITE UNSPECIFIED: Primary | ICD-10-CM

## 2019-12-13 DIAGNOSIS — R39.89 POSSIBLE URINARY TRACT INFECTION: ICD-10-CM

## 2019-12-13 DIAGNOSIS — N39.0 URINARY TRACT INFECTION WITH HEMATURIA, SITE UNSPECIFIED: Primary | ICD-10-CM

## 2019-12-13 LAB
SL AMB  POCT GLUCOSE, UA: NEGATIVE
SL AMB LEUKOCYTE ESTERASE,UA: ABNORMAL
SL AMB POCT BILIRUBIN,UA: NEGATIVE
SL AMB POCT BLOOD,UA: ABNORMAL
SL AMB POCT CLARITY,UA: CLEAR
SL AMB POCT COLOR,UA: YELLOW
SL AMB POCT KETONES,UA: NEGATIVE
SL AMB POCT NITRITE,UA: NEGATIVE
SL AMB POCT PH,UA: 7.5
SL AMB POCT SPECIFIC GRAVITY,UA: 1
SL AMB POCT URINE PROTEIN: NEGATIVE
SL AMB POCT UROBILINOGEN: 0.2

## 2019-12-13 PROCEDURE — 87086 URINE CULTURE/COLONY COUNT: CPT | Performed by: PHYSICIAN ASSISTANT

## 2019-12-13 PROCEDURE — 99213 OFFICE O/P EST LOW 20 MIN: CPT | Performed by: PHYSICIAN ASSISTANT

## 2019-12-13 PROCEDURE — 87077 CULTURE AEROBIC IDENTIFY: CPT | Performed by: PHYSICIAN ASSISTANT

## 2019-12-13 PROCEDURE — 87186 SC STD MICRODIL/AGAR DIL: CPT | Performed by: PHYSICIAN ASSISTANT

## 2019-12-13 PROCEDURE — 81002 URINALYSIS NONAUTO W/O SCOPE: CPT | Performed by: PHYSICIAN ASSISTANT

## 2019-12-13 RX ORDER — SULFAMETHOXAZOLE AND TRIMETHOPRIM 800; 160 MG/1; MG/1
1 TABLET ORAL EVERY 12 HOURS SCHEDULED
Qty: 14 TABLET | Refills: 0 | Status: SHIPPED | OUTPATIENT
Start: 2019-12-13 | End: 2019-12-20

## 2019-12-13 NOTE — PROGRESS NOTES
[facility]        NAME: Audie Edward is a 30 y o  female  : 1990    MRN: [id number]  DATE: 2019  TIME: 9:16 AM    Assessment and Plan   Urinary tract infection with hematuria, site unspecified [N39 0, R31 9]  1  Urinary tract infection with hematuria, site unspecified  sulfamethoxazole-trimethoprim (BACTRIM DS) 800-160 mg per tablet   2  Possible urinary tract infection  POCT urine dip    Urine culture     Urine dip with blood and leukocytes  Culture sent  Will start on bactrim today    Patient Instructions   There are no Patient Instructions on file for this visit  Proceed to  ER if symptoms worsen  Chief Complaint     Chief Complaint   Patient presents with    Possible UTI     Pt started wednesday with c/o burning upon urination, frequency, a little blood in the beginning  Pain is currently #4/10  Pt did not try any OTC treatments  History of Present Illness       Patient is a 78-year-old female who presents for evaluation of burning with urination that began 2 days ago  She also reports frequency  She states there may have been blood in her urine in the beginning but she is not sure because she was just getting over her menstrual cycle  She denies any flank pain or back pain  No fevers, chills, nausea, vomiting  No vaginal discharge  Review of Systems   Review of Systems   Constitutional: Negative for chills and fever  Gastrointestinal: Negative for abdominal pain, nausea and vomiting  Genitourinary: Positive for dysuria and frequency  Negative for vaginal discharge  Musculoskeletal: Negative  Skin: Negative            Current Medications       Current Outpatient Medications:     Prenatal Vit-Fe Fumarate-FA (PRENATAL VITAMIN PO), Take by mouth, Disp: , Rfl:     HOMEOPATHIC PRODUCTS PO, Take 2 tablets by mouth daily, Disp: , Rfl:     neomycin-polymyxin-hydrocortisone (CORTISPORIN) otic solution, Administer 4 drops into the left ear every 6 (six) hours (Patient not taking: Reported on 2019), Disp: 10 mL, Rfl: 0    sulfamethoxazole-trimethoprim (BACTRIM DS) 800-160 mg per tablet, Take 1 tablet by mouth every 12 (twelve) hours for 7 days, Disp: 14 tablet, Rfl: 0    Current Allergies     Allergies as of 2019    (No Known Allergies)            The following portions of the patient's history were reviewed and updated as appropriate: allergies, current medications, past family history, past medical history, past social history, past surgical history and problem list      History reviewed  No pertinent past medical history  Past Surgical History:   Procedure Laterality Date    [place]  DELIVERY ONLY N/A 2017    Procedure:  SECTION (); Surgeon: Elder Santiago MD;  Location: [facility];  Service: Obstetrics    WISDOM TOOTH EXTRACTION         Family History   Problem Relation Age of Onset    No Known Problems Mother     Heart attack Father     Ovarian cancer Paternal Grandmother     Breast cancer Paternal Aunt          Medications have been verified  Objective   /70 (BP Location: Left arm, Patient Position: Sitting)   Pulse 79   Temp (!) 97 2 °F (36 2 °C) (Tympanic)   Resp 16   Ht 5' 4" (1 626 m)   Wt 101 kg (223 lb 9 6 oz)   SpO2 97%   BMI 38 38 kg/m²        Physical Exam     Physical Exam   Constitutional: She appears well-developed  No distress  Cardiovascular: Normal rate and regular rhythm  Pulmonary/Chest: Effort normal and breath sounds normal    Abdominal: Soft  There is no tenderness  There is no CVA tenderness  Neurological: She is alert  Skin: Skin is warm and dry  Vitals reviewed

## 2019-12-15 LAB
BACTERIA UR CULT: ABNORMAL
BACTERIA UR CULT: ABNORMAL

## 2019-12-30 ENCOUNTER — ANNUAL EXAM (OUTPATIENT)
Dept: OBGYN CLINIC | Facility: MEDICAL CENTER | Age: 29
End: 2019-12-30
Payer: COMMERCIAL

## 2019-12-30 VITALS
WEIGHT: 221 LBS | BODY MASS INDEX: 36.82 KG/M2 | SYSTOLIC BLOOD PRESSURE: 116 MMHG | DIASTOLIC BLOOD PRESSURE: 64 MMHG | HEIGHT: 65 IN

## 2019-12-30 DIAGNOSIS — Z01.419 ENCOUNTER FOR ROUTINE GYNECOLOGICAL EXAMINATION WITH PAPANICOLAOU SMEAR OF CERVIX: Primary | ICD-10-CM

## 2019-12-30 PROCEDURE — G0143 SCR C/V CYTO,THINLAYER,RESCR: HCPCS | Performed by: NURSE PRACTITIONER

## 2019-12-30 PROCEDURE — S0612 ANNUAL GYNECOLOGICAL EXAMINA: HCPCS | Performed by: NURSE PRACTITIONER

## 2019-12-30 PROCEDURE — 87624 HPV HI-RISK TYP POOLED RSLT: CPT | Performed by: NURSE PRACTITIONER

## 2019-12-30 NOTE — PROGRESS NOTES
ASSESSMENT & PLAN: Kim Bryant is a 30 y o   with normal gynecologic exam     1   Routine well woman exam done today  2  Pap and HPV:  The patient's last pap and hpv was a few years ago  It was normal     Pap and cotesting was done today  Current ASCCP Guidelines reviewed  3   The following were reviewed in today's visit: breast self exam, family planning choices, adequate intake of calcium and vitamin D, exercise and healthy diet  4  Rto yearly gyn exam      CC:  Annual Gynecologic Examination    HPI: Kim Bryant is a 30 y o  Leilani Hackett who presents for annual gynecologic examination  She has the following concerns: none is still bf her 3 yo  Health Maintenance:    She wears her seatbelt routinely  She does perform regular monthly self breast exams  She feels safe at home  History reviewed  No pertinent past medical history  Past Surgical History:   Procedure Laterality Date     SECTION      [place]  DELIVERY ONLY N/A 2017    Procedure:  SECTION (); Surgeon: Jojo Argueta MD;  Location: [facility];  Service: Obstetrics    WISDOM TOOTH EXTRACTION         Past OB/Gyn History:  OB History        1    Para   1    Term   1            AB        Living   1       SAB        TAB        Ectopic        Multiple   0    Live Births   1               Pt does not have menstrual issues  History of sexually transmitted infection: No   History of abnormal pap smears: No      Patient is currently sexually active    heterosexual   The current method of family planning is condoms     Family History   Problem Relation Age of Onset    Heart attack Father     Ovarian cancer Paternal Grandmother     Breast cancer Paternal Aunt        Social History:  Social History     Socioeconomic History    Marital status: /Civil Union     Spouse name: Not on file    Number of children: Not on file    Years of education: Not on file  Highest education level: Not on file   Occupational History    Not on file   Social Needs    Financial resource strain: Not on file    Food insecurity:     Worry: Not on file     Inability: Not on file    Transportation needs:     Medical: Not on file     Non-medical: Not on file   Tobacco Use    Smoking status: Never Smoker    Smokeless tobacco: Never Used   Substance and Sexual Activity    Alcohol use: Yes     Frequency: 2-4 times a month    Drug use: No    Sexual activity: Yes     Partners: Male     Birth control/protection: Condom Male   Lifestyle    Physical activity:     Days per week: Not on file     Minutes per session: Not on file    Stress: Not on file   Relationships    Social connections:     Talks on phone: Not on file     Gets together: Not on file     Attends Restorationism service: Not on file     Active member of club or organization: Not on file     Attends meetings of clubs or organizations: Not on file     Relationship status: Not on file    Intimate partner violence:     Fear of current or ex partner: Not on file     Emotionally abused: Not on file     Physically abused: Not on file     Forced sexual activity: Not on file   Other Topics Concern    Not on file   Social History Narrative    Not on file     Presently lives with spouse and 3yo  Patient is   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  Plans preg  In about 1 or 2 years  Current Outpatient Medications:     Prenatal Vit-Fe Fumarate-FA (PRENATAL VITAMIN PO), Take by mouth, Disp: , Rfl:       Review of Systems  Constitutional :no fever, feels well, no tiredness, no recent weight gain or loss   ENT: no ear ache, no loss of hearing, no nosebleeds or nasal discharge, no sore throat or hoarseness  Cardiovascular: no complaints of slow or fast heart beat, no chest pain, no palpitations, no leg claudication or lower extremity edema    Respiratory: no complaints of shortness of shortness of breath, no MAGANA  Breasts:no complaints of breast pain, breast lump, or nipple discharge  Gastrointestinal: no complaints of abdominal pain, constipation, nausea, vomiting, or diarrhea or bloody stools  Genitourinary : no complaints of dysuria, incontinence, pelvic pain, no dysmenorrhea, vaginal discharge or abnormal vaginal bleeding and as noted in HPI  Musculoskeletal: no complaints of arthralgia, no myalgia, no joint swelling or stiffness, no limb pain or swelling  Integumentary: no complaints of skin rash or lesion, itching or dry skin  Neurological: no complaints of headache, no confusion, no numbness or tingling, no dizziness or fainting    Objective      /64   Ht 5' 4 5" (1 638 m)   Wt 100 kg (221 lb)   LMP 12/04/2019 (Approximate)   Breastfeeding? Yes   BMI 37 35 kg/m²   General:   appears stated age, cooperative, alert normal mood and affect   Neck: normal, supple,trachea midline, no masses   Heart: regular rate and rhythm, S1, S2 normal, no murmur, click, rub or gallop   Lungs: clear to auscultation bilaterally   Breasts: normal appearance, no masses or tenderness   Abdomen: soft, non-tender, without masses or organomegaly   Vulva: normal   Vagina: normal vagina   Urethra: normal   Cervix: Normal, no discharge  Uterus: normal size, contour, position, consistency, mobility, non-tender   Adnexa: no mass, fullness, tenderness   Lymphatic palpation of lymph nodes in neck, axilla, groin and/or other locations: no lymphadenopathy or masses noted   Skin normal skin turgor and no rashes     Psychiatric orientation to person, place, and time: normal  mood and affect: normal

## 2019-12-31 LAB
HPV HR 12 DNA CVX QL NAA+PROBE: NEGATIVE
HPV16 DNA CVX QL NAA+PROBE: NEGATIVE
HPV18 DNA CVX QL NAA+PROBE: NEGATIVE

## 2020-01-02 LAB
LAB AP GYN PRIMARY INTERPRETATION: NORMAL
LAB AP LMP: NORMAL
Lab: NORMAL

## 2020-07-14 DIAGNOSIS — Z32.01 POSITIVE PREGNANCY TEST: Primary | ICD-10-CM

## 2020-07-15 ENCOUNTER — APPOINTMENT (OUTPATIENT)
Dept: LAB | Facility: HOSPITAL | Age: 30
End: 2020-07-15
Attending: OBSTETRICS & GYNECOLOGY
Payer: COMMERCIAL

## 2020-07-15 DIAGNOSIS — Z32.01 POSITIVE PREGNANCY TEST: ICD-10-CM

## 2020-07-15 LAB
B-HCG SERPL-ACNC: ABNORMAL MIU/ML
PROGEST SERPL-MCNC: 11.4 NG/ML

## 2020-07-15 PROCEDURE — 84144 ASSAY OF PROGESTERONE: CPT

## 2020-07-15 PROCEDURE — 36415 COLL VENOUS BLD VENIPUNCTURE: CPT

## 2020-07-15 PROCEDURE — 84702 CHORIONIC GONADOTROPIN TEST: CPT

## 2020-07-16 ENCOUNTER — OFFICE VISIT (OUTPATIENT)
Dept: URGENT CARE | Facility: CLINIC | Age: 30
End: 2020-07-16
Payer: COMMERCIAL

## 2020-07-16 VITALS
OXYGEN SATURATION: 98 % | SYSTOLIC BLOOD PRESSURE: 133 MMHG | RESPIRATION RATE: 16 BRPM | WEIGHT: 232.6 LBS | HEART RATE: 94 BPM | BODY MASS INDEX: 39.71 KG/M2 | DIASTOLIC BLOOD PRESSURE: 81 MMHG | HEIGHT: 64 IN | TEMPERATURE: 99.5 F

## 2020-07-16 DIAGNOSIS — H60.331 ACUTE SWIMMER'S EAR OF RIGHT SIDE: Primary | ICD-10-CM

## 2020-07-16 PROCEDURE — 99213 OFFICE O/P EST LOW 20 MIN: CPT | Performed by: PHYSICIAN ASSISTANT

## 2020-07-16 RX ORDER — CIPROFLOXACIN HYDROCHLORIDE 3.5 MG/ML
SOLUTION/ DROPS TOPICAL
Qty: 5 ML | Refills: 0 | Status: SHIPPED | OUTPATIENT
Start: 2020-07-16 | End: 2020-08-05 | Stop reason: ALTCHOICE

## 2020-07-16 NOTE — PROGRESS NOTES
[facility]        NAME: Lisette Brumfield is a 30 y o  female  : 1990    MRN: [id number]  DATE: 2020  TIME: 12:03 PM    Assessment and Plan   Acute swimmer's ear of right side [H60 331]  1  Acute swimmer's ear of right side  ciprofloxacin (CILOXAN) 0 3 % ophthalmic solution         Patient Instructions     Use ear drops as directed  Monitor for worsening symptoms  Follow up with PCP in 3-5 days  Proceed to  ER if symptoms worsen  Chief Complaint     Chief Complaint   Patient presents with    Earache     Pt has c/o right ear pain since   Pt was swimming   History of Present Illness       Patient, 6 weeks pregnant, presents with complaint of right ear pain since   Patient reports that she has been swimming a lot  She denies any other associated symptoms including fever, chills, night sweats, sinus congestion, sore throat, cough, and drainage from the ear  She states that this happens to her almost every summer  She describes the pain as sharp and exacerbated by laying on her right side  She denies recent antibiotic use and denies known antibiotic allergies  Review of Systems   Review of Systems   Constitutional: Negative for chills, fatigue and fever  HENT: Positive for ear pain  Negative for congestion, ear discharge and sore throat  Respiratory: Negative for cough, chest tightness, shortness of breath and wheezing  Cardiovascular: Negative for chest pain and palpitations  Musculoskeletal: Negative for myalgias  Skin: Negative for color change, rash and wound  Neurological: Negative for headaches           Current Medications       Current Outpatient Medications:     Prenatal Vit-Fe Fumarate-FA (PRENATAL VITAMIN PO), Take by mouth, Disp: , Rfl:     ciprofloxacin (CILOXAN) 0 3 % ophthalmic solution, Apply 3 drops in affected ear twice daily, Disp: 5 mL, Rfl: 0    Current Allergies     Allergies as of 2020    (No Known Allergies)            The following portions of the patient's history were reviewed and updated as appropriate: allergies, current medications, past family history, past medical history, past social history, past surgical history and problem list      History reviewed  No pertinent past medical history  Past Surgical History:   Procedure Laterality Date     SECTION      [place]  DELIVERY ONLY N/A 2017    Procedure:  SECTION (); Surgeon: Carin Garcia MD;  Location: [facility];  Service: Obstetrics    WISDOM TOOTH EXTRACTION         Family History   Problem Relation Age of Onset    Heart attack Father     Ovarian cancer Paternal Grandmother     Breast cancer Paternal Aunt          Medications have been verified  Objective   /81 (BP Location: Left arm, Patient Position: Sitting)   Pulse 94   Temp 99 5 °F (37 5 °C) (Tympanic)   Resp 16   Ht 5' 4" (1 626 m)   Wt 106 kg (232 lb 9 6 oz)   LMP 2019 (Approximate)   SpO2 98%   BMI 39 93 kg/m²        Physical Exam     Physical Exam   Constitutional: She is oriented to person, place, and time  She appears well-developed and well-nourished  No distress  HENT:   Head: Normocephalic and atraumatic  Right Ear: External ear normal    Left Ear: External ear normal    Nose: Nose normal    Mouth/Throat: Oropharynx is clear and moist  No oropharyngeal exudate  Right ear: Erythema and edema of external canal, minimal drainage   Eyes: Pupils are equal, round, and reactive to light  Conjunctivae and EOM are normal    Neck: Normal range of motion  Neck supple  Cardiovascular: Normal rate, regular rhythm and normal heart sounds  Pulmonary/Chest: Effort normal and breath sounds normal  No respiratory distress  Lymphadenopathy:     She has no cervical adenopathy  Neurological: She is alert and oriented to person, place, and time  No cranial nerve deficit or sensory deficit  Skin: Skin is warm and dry  Capillary refill takes less than 2 seconds  No rash noted  She is not diaphoretic  Psychiatric: She has a normal mood and affect  Her behavior is normal  Thought content normal    Nursing note and vitals reviewed

## 2020-07-31 ENCOUNTER — ULTRASOUND (OUTPATIENT)
Dept: OBGYN CLINIC | Facility: MEDICAL CENTER | Age: 30
End: 2020-07-31
Payer: COMMERCIAL

## 2020-07-31 VITALS
DIASTOLIC BLOOD PRESSURE: 82 MMHG | SYSTOLIC BLOOD PRESSURE: 120 MMHG | WEIGHT: 234.1 LBS | BODY MASS INDEX: 40.18 KG/M2 | TEMPERATURE: 98.7 F

## 2020-07-31 DIAGNOSIS — N91.2 AMENORRHEA: Primary | ICD-10-CM

## 2020-07-31 PROCEDURE — 99214 OFFICE O/P EST MOD 30 MIN: CPT | Performed by: OBSTETRICS & GYNECOLOGY

## 2020-07-31 PROCEDURE — 76830 TRANSVAGINAL US NON-OB: CPT | Performed by: OBSTETRICS & GYNECOLOGY

## 2020-08-04 ENCOUNTER — TELEPHONE (OUTPATIENT)
Dept: PERINATAL CARE | Facility: CLINIC | Age: 30
End: 2020-08-04

## 2020-08-04 ENCOUNTER — APPOINTMENT (OUTPATIENT)
Dept: LAB | Facility: HOSPITAL | Age: 30
End: 2020-08-04
Attending: OBSTETRICS & GYNECOLOGY
Payer: COMMERCIAL

## 2020-08-04 DIAGNOSIS — N91.2 AMENORRHEA: ICD-10-CM

## 2020-08-04 LAB
ABO GROUP BLD: NORMAL
BASOPHILS # BLD AUTO: 0.02 THOUSANDS/ΜL (ref 0–0.1)
BASOPHILS NFR BLD AUTO: 0 % (ref 0–1)
BILIRUB UR QL STRIP: NEGATIVE
BLD GP AB SCN SERPL QL: NEGATIVE
CLARITY UR: CLEAR
COLOR UR: ABNORMAL
EOSINOPHIL # BLD AUTO: 0.18 THOUSAND/ΜL (ref 0–0.61)
EOSINOPHIL NFR BLD AUTO: 2 % (ref 0–6)
ERYTHROCYTE [DISTWIDTH] IN BLOOD BY AUTOMATED COUNT: 12.8 % (ref 11.6–15.1)
GLUCOSE 1H P 50 G GLC PO SERPL-MCNC: 105 MG/DL
GLUCOSE UR STRIP-MCNC: NEGATIVE MG/DL
HBV SURFACE AG SER QL: NORMAL
HCT VFR BLD AUTO: 41.3 % (ref 34.8–46.1)
HGB BLD-MCNC: 13.7 G/DL (ref 11.5–15.4)
HGB UR QL STRIP.AUTO: NEGATIVE
IMM GRANULOCYTES # BLD AUTO: 0.03 THOUSAND/UL (ref 0–0.2)
IMM GRANULOCYTES NFR BLD AUTO: 0 % (ref 0–2)
KETONES UR STRIP-MCNC: ABNORMAL MG/DL
LEUKOCYTE ESTERASE UR QL STRIP: NEGATIVE
LYMPHOCYTES # BLD AUTO: 1.61 THOUSANDS/ΜL (ref 0.6–4.47)
LYMPHOCYTES NFR BLD AUTO: 20 % (ref 14–44)
MCH RBC QN AUTO: 29.7 PG (ref 26.8–34.3)
MCHC RBC AUTO-ENTMCNC: 33.2 G/DL (ref 31.4–37.4)
MCV RBC AUTO: 89 FL (ref 82–98)
MONOCYTES # BLD AUTO: 0.4 THOUSAND/ΜL (ref 0.17–1.22)
MONOCYTES NFR BLD AUTO: 5 % (ref 4–12)
NEUTROPHILS # BLD AUTO: 5.8 THOUSANDS/ΜL (ref 1.85–7.62)
NEUTS SEG NFR BLD AUTO: 73 % (ref 43–75)
NITRITE UR QL STRIP: NEGATIVE
NRBC BLD AUTO-RTO: 0 /100 WBCS
PH UR STRIP.AUTO: 7 [PH]
PLATELET # BLD AUTO: 196 THOUSANDS/UL (ref 149–390)
PMV BLD AUTO: 10 FL (ref 8.9–12.7)
PROT UR STRIP-MCNC: NEGATIVE MG/DL
RBC # BLD AUTO: 4.62 MILLION/UL (ref 3.81–5.12)
RH BLD: POSITIVE
RUBV IGG SERPL IA-ACNC: 48.2 IU/ML
SP GR UR STRIP.AUTO: 1.02 (ref 1–1.03)
SPECIMEN EXPIRATION DATE: NORMAL
UROBILINOGEN UR QL STRIP.AUTO: 1 E.U./DL
WBC # BLD AUTO: 8.04 THOUSAND/UL (ref 4.31–10.16)

## 2020-08-04 PROCEDURE — 82950 GLUCOSE TEST: CPT

## 2020-08-04 PROCEDURE — 80081 OBSTETRIC PANEL INC HIV TSTG: CPT

## 2020-08-04 PROCEDURE — 36415 COLL VENOUS BLD VENIPUNCTURE: CPT

## 2020-08-04 PROCEDURE — 87086 URINE CULTURE/COLONY COUNT: CPT

## 2020-08-04 PROCEDURE — 81003 URINALYSIS AUTO W/O SCOPE: CPT

## 2020-08-04 NOTE — TELEPHONE ENCOUNTER
Attempted to reach patient by phone and left voicemail to confirm appointment for MFM ultrasound  1 support person ( must be over the age of 15) may accompany you for your appointment  If you or your support person have traveled outside the state in the past 2 weeks, please call and notify our office today #[phone]  You and your support person must wear a mask ,covering nose and mouth,during your entire visit  You and your support person will have temperature screened upon arrival     To minimize your exposure in our waiting room, please call our office prior to entering the building  Check in and rooming questions will be done via phone  We will give you directions when to enter for your appointment  Inside office # provided:  Amber Mckinney line: [phone]  Jasiel line:  [phone]  [facility] line:  [phone] line:  [phone]  Karla Castillo line:  [phone]  Irene line:  [phone]    IF you are not feeling well- cough, fever, shortness of breath or any flu like symptoms, contact your primary care physician or [facility]res    Any questions with these instructions please call Maternal Fetal Medicine nurse line today @ # [phone]

## 2020-08-05 ENCOUNTER — ULTRASOUND (OUTPATIENT)
Dept: PERINATAL CARE | Facility: CLINIC | Age: 30
End: 2020-08-05
Payer: COMMERCIAL

## 2020-08-05 VITALS
WEIGHT: 232 LBS | HEART RATE: 96 BPM | BODY MASS INDEX: 39.61 KG/M2 | SYSTOLIC BLOOD PRESSURE: 136 MMHG | DIASTOLIC BLOOD PRESSURE: 88 MMHG | TEMPERATURE: 99 F | HEIGHT: 64 IN

## 2020-08-05 DIAGNOSIS — Z98.891 S/P CESAREAN SECTION: ICD-10-CM

## 2020-08-05 DIAGNOSIS — O30.041 DICHORIONIC DIAMNIOTIC TWIN PREGNANCY IN FIRST TRIMESTER: Primary | ICD-10-CM

## 2020-08-05 DIAGNOSIS — Z3A.09 9 WEEKS GESTATION OF PREGNANCY: ICD-10-CM

## 2020-08-05 DIAGNOSIS — E66.01 MORBID OBESITY DUE TO EXCESS CALORIES (HCC): ICD-10-CM

## 2020-08-05 DIAGNOSIS — N91.2 AMENORRHEA: ICD-10-CM

## 2020-08-05 LAB
BACTERIA UR CULT: NORMAL
HIV 1+2 AB+HIV1 P24 AG SERPL QL IA: NORMAL
RPR SER QL: NORMAL

## 2020-08-05 PROCEDURE — 99242 OFF/OP CONSLTJ NEW/EST SF 20: CPT | Performed by: OBSTETRICS & GYNECOLOGY

## 2020-08-05 PROCEDURE — 76801 OB US < 14 WKS SINGLE FETUS: CPT | Performed by: OBSTETRICS & GYNECOLOGY

## 2020-08-05 PROCEDURE — 76802 OB US < 14 WKS ADDL FETUS: CPT | Performed by: OBSTETRICS & GYNECOLOGY

## 2020-08-05 RX ORDER — FERROUS SULFATE TAB EC 324 MG (65 MG FE EQUIVALENT) 324 (65 FE) MG
324 TABLET DELAYED RESPONSE ORAL
Qty: 30 TABLET | Refills: 6 | Status: SHIPPED | OUTPATIENT
Start: 2020-08-05

## 2020-08-05 RX ORDER — PHENOL 1.4 %
600 AEROSOL, SPRAY (ML) MUCOUS MEMBRANE 2 TIMES DAILY WITH MEALS
Qty: 60 TABLET | Refills: 6 | Status: SHIPPED | OUTPATIENT
Start: 2020-08-05 | End: 2021-02-20 | Stop reason: HOSPADM

## 2020-08-05 NOTE — LETTER
2020     MD Giles Olivo Jigna    Patient: Ingris Leos   YOB: 1990   Date of Visit: 2020       Dear Dr Ron Greene: Thank you for referring Liu Boswell to me for evaluation  Below are my notes for this consultation  If you have questions, please do not hesitate to call me  I look forward to following your patient along with you  Sincerely,        Bill Dowling MD        CC: No Recipients  Bill Dowling MD  2020 12:57 PM  Sign when Signing Kristie Muss  Dear Dr Ron Greene     Thank you for requesting a  consultation on your patient Ingris Leos for the following indications:  Dating and suspected twin pregnancy    History: Sanda Meckel is currently on prenatal vitamins and denies any known drug allergies  Her medical history includes increased BMI of 39  Surgical history includes a prior elective low-transverse  for suspected macrosomia in 2017 and wisdom tooth extraction  Her baby girl weighed 8 pounds 10 ounces at the time of birth  She does not report any 1st generation family history of hypertension, diabetes, thrombosis or congenital anomalies  She denies any use of cigarettes, alcohol or illicit drugs  She reports she was breastfeeding and had irregular periods around the time of conception but she thinks she conceived on 20 which would also be concordant with her due date by today's ultrasound  Ultrasound findings:  Dichorionic diamniotic twin pregnancies are noted with a thick membrane between  Her largest baby measures 9 weeks 6 days which gives a due date of 3/4/21  Her babies are concordant with 1 another  The patient was informed of the findings and counseled about the limitations of the exam in detecting all forms of fetal congenital abnormalities  She denies any vaginal bleeding or uterine cramping/contractions  Follow up recommended:    This pregnancy is at increased risk over singletons for gestational diabetes,  delivery, preeclampsia and IUGR  For the remainder of the pregnancy I recommend the followin  Extra iron (60-80mg),  calcium (4942AA ) and folic acid (1mg) during the pregnancy due to the nutritional demands of a twin gestation  I would also recommend starting 162 milligrams of baby aspirin daily at 14 weeks to decrease her risk for developing preeclampsia  She is aware to take her iron on empty stomach every other day for best absorption and less risk for constipation  2  I recommend an early glucola or FBS and hgba1c, then a repeat glucola at 28 weeks as twins have a higher risk to develop gestational diabetes  3  Her anatomy scan is planned for 20 weeks along with a cervix length  Ultrasounds for growth, and fluid are suggested every 4 weeks after 28 weeks  If there is discordancy greater than 20%, then more frequent surveillance may be indicated  4  There are no studies that prove fetal testing is of benefit in dichorionic pregnancies with normal fetal growth  I however do offer at minimum weekly fetal testing of NST or BPP and BRANDI beginning at 32 weeks unless either or both of the fetuses are <10% or there is a discordancy rate of 20% or more  5  I encourage maternal teaching about how to feel for contractions after 20+ weeks and to notify your office if contractions are occurring 4 or more per hour  Routine cessation of working is not recommended but may be considered based on the job requirements  If signs of  labor are detected then steroids to enhance lung maturity can be offered till 34 weeks and magnesium sulfate can be offered till 32 weeks to lower the risk for cerebral palsy to develop postnatally  6  Close observation for signs of preeclampsia  7  As per ACOG there are no prospective randomized trials that have been able to show the optimal timing for delivery of twins   Most twins will deliver between 35-37 weeks on their own  For the twin pregnancy that does not deliver spontaneously, delivery can be offered anytime after 38 weeks for maternal discomfort,  worsening dyspnea, inability to sleep, severe dependent edema, painful superficial varicosities  The majority of time (greater then 50%) was spent on counseling and coordination of care of this patient and /or family member  Approximate face to face time was 15 minutes        Richie Plaza MD

## 2020-08-05 NOTE — PROGRESS NOTES
Shanell Kruger  Dear Dr Patricio Barnhart     Thank you for requesting a  consultation on your patient Bhupendra Catalan for the following indications:  Dating and suspected twin pregnancy    History: Jose Bridges is currently on prenatal vitamins and denies any known drug allergies  Her medical history includes increased BMI of 39  Surgical history includes a prior elective low-transverse  for suspected macrosomia in 2017 and wisdom tooth extraction  Her baby girl weighed 8 pounds 10 ounces at the time of birth  She does not report any 1st generation family history of hypertension, diabetes, thrombosis or congenital anomalies  She denies any use of cigarettes, alcohol or illicit drugs  She reports she was breastfeeding and had irregular periods around the time of conception but she thinks she conceived on 20 which would also be concordant with her due date by today's ultrasound  Ultrasound findings:  Dichorionic diamniotic twin pregnancies are noted with a thick membrane between  Her largest baby measures 9 weeks 6 days which gives a due date of 3/4/21  Her babies are concordant with 1 another  The patient was informed of the findings and counseled about the limitations of the exam in detecting all forms of fetal congenital abnormalities  She denies any vaginal bleeding or uterine cramping/contractions  Follow up recommended: This pregnancy is at increased risk over singletons for gestational diabetes,  delivery, preeclampsia and IUGR  For the remainder of the pregnancy I recommend the followin  Extra iron (60-80mg),  calcium (3206RZ ) and folic acid (1mg) during the pregnancy due to the nutritional demands of a twin gestation  I would also recommend starting 162 milligrams of baby aspirin daily at 14 weeks to decrease her risk for developing preeclampsia  She is aware to take her iron on empty stomach every other day for best absorption and less risk for constipation    2  I recommend an early glucola or FBS and hgba1c, then a repeat glucola at 28 weeks as twins have a higher risk to develop gestational diabetes  3  Her anatomy scan is planned for 20 weeks along with a cervix length  Ultrasounds for growth, and fluid are suggested every 4 weeks after 28 weeks  If there is discordancy greater than 20%, then more frequent surveillance may be indicated  4  There are no studies that prove fetal testing is of benefit in dichorionic pregnancies with normal fetal growth  I however do offer at minimum weekly fetal testing of NST or BPP and BRANDI beginning at 32 weeks unless either or both of the fetuses are <10% or there is a discordancy rate of 20% or more  5  I encourage maternal teaching about how to feel for contractions after 20+ weeks and to notify your office if contractions are occurring 4 or more per hour  Routine cessation of working is not recommended but may be considered based on the job requirements  If signs of  labor are detected then steroids to enhance lung maturity can be offered till 34 weeks and magnesium sulfate can be offered till 32 weeks to lower the risk for cerebral palsy to develop postnatally  6  Close observation for signs of preeclampsia  7  As per ACOG there are no prospective randomized trials that have been able to show the optimal timing for delivery of twins  Most twins will deliver between 35-37 weeks on their own  For the twin pregnancy that does not deliver spontaneously, delivery can be offered anytime after 38 weeks for maternal discomfort,  worsening dyspnea, inability to sleep, severe dependent edema, painful superficial varicosities  The majority of time (greater then 50%) was spent on counseling and coordination of care of this patient and /or family member  Approximate face to face time was 15 minutes        Salome Miller MD

## 2020-08-17 NOTE — PATIENT INSTRUCTIONS
Pregnancy at 7 to [address]:   What changes are happening to your body:  Pregnancy hormones may cause your body to go through many changes during this stage of your pregnancy  You may feel more tired than usual, and have mood swings, nausea and vomiting, and headaches  Your breasts may feel tender and swollen and you may urinate more frequently  Seek care immediately if:   · You have pain or cramping in your abdomen or low back  · You have heavy vaginal bleeding or clotting  · You pass material that looks like tissue or large clots  Collect the material and bring it with you  Contact your healthcare provider if:   · You have light bleeding  · You have chills or a fever  · You have vaginal itching, burning, or pain  · You have yellow, green, white, or foul-smelling vaginal discharge  · You have pain or burning when you urinate, less urine than usual, or pink or bloody urine  · You have questions or concerns about your condition or care  How to care for yourself at this stage of your pregnancy:   · Manage nausea and vomiting  Avoid fatty and spicy foods  Eat small meals throughout the day instead of large meals  Saray may help to decrease nausea  Ask your healthcare provider about other ways of decreasing nausea and vomiting  · Eat a variety of healthy foods  Healthy foods include fruits, vegetables, whole-grain breads, low-fat dairy foods, beans, lean meats, and fish  Drink liquids as directed  Ask how much liquid to drink each day and which liquids are best for you  Limit caffeine to less than 200 milligrams each day  Limit your intake of fish to 2 servings each week  Choose fish low in mercury such as canned light tuna, shrimp, salmon, cod, or tilapia  Do not  eat fish high in mercury such as swordfish, tilefish, zahida mackerel, and shark  · Take prenatal vitamins as directed    Your need for certain vitamins and minerals, such as folic acid, increases during pregnancy  Prenatal vitamins provide some of the extra vitamins and minerals you need  Prenatal vitamins may also help to decrease the risk of certain birth defects  · Ask how much weight you should gain each month  Too much or too little weight gain can be unhealthy for you and your baby  · Do not smoke  If you smoke, it is never too late to quit  Smoking increases your risk of a miscarriage and other health problems during your pregnancy  Smoking can cause your baby to be born too early or weigh less at birth  Ask your healthcare provider for information if you need help quitting  · Do not drink alcohol  Alcohol passes from your body to your baby through the placenta  It can affect your baby's brain development and cause fetal alcohol syndrome (FAS)  FAS is a group of conditions that causes mental, behavior, and growth problems  · Talk to your healthcare provider before you take any medicines  Many medicines may harm your baby if you take them when you are pregnant  Do not take any medicines, vitamins, herbs, or supplements without first talking to your healthcare provider  Never use illegal or street drugs (such as marijuana or cocaine) while you are pregnant  Safety tips during pregnancy:   · Avoid hot tubs and saunas  Do not use a hot tub or sauna while you are pregnant, especially during your first trimester  Hot tubs and saunas may raise your baby's temperature and increase the risk of birth defects  · Avoid toxoplasmosis  This is an infection caused by eating raw meat or being around infected cat feces  It can cause birth defects, miscarriages, and other problems  Wash your hands after you touch raw meat  Make sure any meat is well-cooked before you eat it  Avoid raw eggs and unpasteurized milk  Use gloves or ask someone else to clean your cat's litter box while you are pregnant    Changes that are happening with your baby:  By 10 weeks, your baby will be about 2 ½ inches long from the top of the head to the rump (baby's bottom)  Your baby weighs about ½ ounce  Major body organs, such as the brain, heart, and lungs, are forming  Your baby's facial features are also starting to form  What you need to know about prenatal care:  Prenatal care is a series of visits with your healthcare provider throughout your pregnancy  During the first 28 weeks of your pregnancy, you will see your healthcare provider once a month  Prenatal care can help prevent problems during pregnancy and childbirth  Your healthcare provider will ask questions about your health and any previous pregnancies you have had  He will also ask about any medicines you are taking  You may also need any of the following:  · A pap smear  will be done to check your cervix for abnormal cells  The cervix is the narrow opening at the bottom of your uterus  The cervix meets the top part of the vagina  · A pelvic exam  allows your healthcare provider to see your cervix (the bottom part of your uterus)  Your healthcare provider uses a speculum to gently open your vagina  He will check the size and shape of your uterus  · Blood tests  may be done to check for anemia or blood type  Your healthcare provider may also order other blood tests to check if you are immune to certain diseases such as Hepatitis B  He may also recommend an HIV test     · Urine tests  may also be done to check for signs of infection  · Your blood pressure and weight  will be checked  © 2017 [employer] Information is for End User's use only and may not be sold, redistributed or otherwise used for commercial purposes  All illustrations and images included in CareNotes® are the copyrighted property of [employer] M [employer]  or Francisco Lopez  The above information is an  only  It is not intended as medical advice for individual conditions or treatments   Talk to your doctor, nurse or pharmacist before following any medical regimen to see if it is safe and effective for you  Pregnancy at 11 to 14 Weeks   AMBULATORY CARE:   What changes are happening to your body: You are now at the end of your first trimester and entering your second trimester  Morning sickness usually goes away by this time  You may have other symptoms such as fatigue, frequent urination, and headaches  You may have gained between 2 to 4 pounds by now  Seek care immediately if:   · You have pain or cramping in your abdomen or low back  · You have heavy vaginal bleeding or clotting  · You pass material that looks like tissue or large clots  Collect the material and bring it with you  Contact your healthcare provider if:   · You cannot keep food or drinks down, and you are losing weight  · You have light bleeding  · You have chills or a fever  · You have vaginal itching, burning, or pain  · You have yellow, green, white, or foul-smelling vaginal discharge  · You have pain or burning when you urinate, less urine than usual, or pink or bloody urine  · You have questions or concerns about your condition or care  How to care for yourself at this stage of your pregnancy:   · Get plenty of rest   You may feel more tired than normal  You may need to take naps or go to bed earlier  · Manage nausea and vomiting  Avoid fatty and spicy foods  Eat small meals throughout the day instead of large meals  Saray may help to decrease nausea  Ask your healthcare provider about other ways of decreasing nausea and vomiting  · Eat a variety of healthy foods  Healthy foods include fruits, vegetables, whole-grain breads, low-fat dairy foods, beans, lean meats, and fish  Drink liquids as directed  Ask how much liquid to drink each day and which liquids are best for you  Limit caffeine to less than 200 milligrams each day  Limit your intake of fish to 2 servings each week  Choose fish low in mercury such as canned light tuna, shrimp, salmon, cod, or tilapia   Do not  eat fish high in mercury such as swordfish, tilefish, zahida mackerel, and shark  · Take prenatal vitamins as directed  Your need for certain vitamins and minerals, such as folic acid, increases during pregnancy  Prenatal vitamins provide some of the extra vitamins and minerals you need  Prenatal vitamins may also help to decrease the risk of certain birth defects  · Do not smoke  If you smoke, it is never too late to quit  Smoking increases your risk of a miscarriage and other health problems during your pregnancy  Smoking can cause your baby to be born too early or weigh less at birth  Ask your healthcare provider for information if you need help quitting  · Do not drink alcohol  Alcohol passes from your body to your baby through the placenta  It can affect your baby's brain development and cause fetal alcohol syndrome (FAS)  FAS is a group of conditions that causes mental, behavior, and growth problems  · Talk to your healthcare provider before you take any medicines  Many medicines may harm your baby if you take them when you are pregnant  Do not take any medicines, vitamins, herbs, or supplements without first talking to your healthcare provider  Never use illegal or street drugs (such as marijuana or cocaine) while you are pregnant  Safety tips during pregnancy:   · Avoid hot tubs and saunas  Do not use a hot tub or sauna while you are pregnant, especially during your first trimester  Hot tubs and saunas may raise your baby's temperature and increase the risk of birth defects  · Avoid toxoplasmosis  This is an infection caused by eating raw meat or being around infected cat feces  It can cause birth defects, miscarriages, and other problems  Wash your hands after you touch raw meat  Make sure any meat is well-cooked before you eat it  Avoid raw eggs and unpasteurized milk  Use gloves or ask someone else to clean your cat's litter box while you are pregnant    Changes that are happening with your baby: Your baby has fully formed fingernails and toenails  Your baby's heartbeat can now be heard  Ask your healthcare provider if you can listen to your baby's heartbeat  By week 14, your baby is over 4 inches long from the top of the head to the rump (baby's bottom)  Your baby weighs over 3 ounces  What you need to know about prenatal care:  During the first 28 weeks of your pregnancy, you will see your healthcare provider once a month  Prenatal care can help prevent problems during pregnancy and childbirth  Your healthcare provider will check your blood pressure and weight  You may also need any of the following:  · A urine test  may also be done to check for sugar and protein  These can be signs of gestational diabetes or infection  · Genetic disorders screening tests  may be offered to you  This screening test checks your baby's risk of genetic disorders such as Down syndrome  The screening test includes a blood test and ultrasound  · Your baby's heart rate  will be checked  © 2017 [address] [employer]  Information is for End User's use only and may not be sold, redistributed or otherwise used for commercial purposes  All illustrations and images included in CareNotes® are the copyrighted property of [employer] M [employer] Inc  or Francisco Lopez  The above information is an  only  It is not intended as medical advice for individual conditions or treatments  Talk to your doctor, nurse or pharmacist before following any medical regimen to see if it is safe and effective for you

## 2020-08-20 ENCOUNTER — TELEPHONE (OUTPATIENT)
Dept: OBGYN CLINIC | Facility: MEDICAL CENTER | Age: 30
End: 2020-08-20

## 2020-08-20 ENCOUNTER — INITIAL PRENATAL (OUTPATIENT)
Dept: OBGYN CLINIC | Facility: MEDICAL CENTER | Age: 30
End: 2020-08-20

## 2020-08-20 DIAGNOSIS — Z34.91 ENCOUNTER FOR PREGNANCY RELATED EXAMINATION IN FIRST TRIMESTER: Primary | ICD-10-CM

## 2020-08-20 PROCEDURE — OBC: Performed by: OBSTETRICS & GYNECOLOGY

## 2020-08-20 PROCEDURE — 87086 URINE CULTURE/COLONY COUNT: CPT | Performed by: OBSTETRICS & GYNECOLOGY

## 2020-08-20 NOTE — PROGRESS NOTES
OB INTAKE INTERVIEW      Pt presents for OB intake    OB History    Para Term  AB Living   2 1 1     1   SAB TAB Ectopic Multiple Live Births         0 1      # Outcome Date GA Lbr David/2nd Weight Sex Delivery Anes PTL Lv   2 Current            1 Term 17 39w6d  3912 g (8 lb 10 oz) F CS-LTranv Spinal N WANG         Hx of  delivery prior to 36 weeks 6 days:  NO   Last Menstrual Period:   Patient's last menstrual period was 2020  Estimated date of delivery:   Estimated Date of Delivery: 3/4/21  ? History of Diabetes: NO  History of Hypertension: NO      Infection Screening: Does the pt have a hx of MRSA? NO    H&P visit scheduled  ?  Interview education  Information on [employer] Pregnancy Essentials reviewed  Handouts given: Baby and Me support center  [facility] Tasneem Tyson in Pregnancy information sheet   COVID-19: precautions and travel restrictions reviewed    Interview education    [facility]  Discussed genetic testing-    - pt has NT scheduled for 2020  CF carrier screening completed with previous pregnancy-result negative  Information on SMA carrier screening reviewed-will let office know at next appointment if screening desired            Discussed Tdap and Influenza vaccines         Depression Screening Follow-up Plan: Patient's depression screening was Negative  with an Veguita score of  2    Repeat urine culture obtained                 The patient was oriented to our practice and all questions were answered    Interviewed by: Isaac Cifuentes RN 20

## 2020-08-21 LAB — BACTERIA UR CULT: NORMAL

## 2020-08-24 PROBLEM — R73.02 IMPAIRED GLUCOSE TOLERANCE: Status: ACTIVE | Noted: 2020-08-24

## 2020-08-25 ENCOUNTER — INITIAL PRENATAL (OUTPATIENT)
Dept: OBGYN CLINIC | Facility: MEDICAL CENTER | Age: 30
End: 2020-08-25

## 2020-08-25 ENCOUNTER — TELEPHONE (OUTPATIENT)
Dept: PERINATAL CARE | Facility: CLINIC | Age: 30
End: 2020-08-25

## 2020-08-25 VITALS — WEIGHT: 234 LBS | SYSTOLIC BLOOD PRESSURE: 118 MMHG | BODY MASS INDEX: 40.17 KG/M2 | DIASTOLIC BLOOD PRESSURE: 66 MMHG

## 2020-08-25 DIAGNOSIS — R73.02 IMPAIRED GLUCOSE TOLERANCE: ICD-10-CM

## 2020-08-25 DIAGNOSIS — Z98.891 S/P CESAREAN SECTION: ICD-10-CM

## 2020-08-25 DIAGNOSIS — O30.041 DICHORIONIC DIAMNIOTIC TWIN PREGNANCY IN FIRST TRIMESTER: Primary | ICD-10-CM

## 2020-08-25 PROCEDURE — 87591 N.GONORRHOEAE DNA AMP PROB: CPT | Performed by: STUDENT IN AN ORGANIZED HEALTH CARE EDUCATION/TRAINING PROGRAM

## 2020-08-25 PROCEDURE — 87086 URINE CULTURE/COLONY COUNT: CPT | Performed by: STUDENT IN AN ORGANIZED HEALTH CARE EDUCATION/TRAINING PROGRAM

## 2020-08-25 PROCEDURE — PNV: Performed by: STUDENT IN AN ORGANIZED HEALTH CARE EDUCATION/TRAINING PROGRAM

## 2020-08-25 PROCEDURE — 87491 CHLMYD TRACH DNA AMP PROBE: CPT | Performed by: STUDENT IN AN ORGANIZED HEALTH CARE EDUCATION/TRAINING PROGRAM

## 2020-08-25 RX ORDER — ASPIRIN 81 MG/1
81 TABLET ORAL DAILY
Qty: 90 TABLET | Refills: 2 | Status: SHIPPED | OUTPATIENT
Start: 2020-08-25 | End: 2021-02-20 | Stop reason: HOSPADM

## 2020-08-25 NOTE — TELEPHONE ENCOUNTER
Attempted to reach patient by phone and left voicemail to confirm appointment for MFM ultrasound  1 support person ( must be over the age of 15) may accompany you for your appointment  If you or your support person have traveled outside the state in the past 2 weeks, please call and notify our office today #[phone]  You and your support person must wear a mask ,covering nose and mouth,during your entire visit  You and your support person will have temperature screened upon arrival     To minimize your exposure in our waiting room, please call our office prior to entering the building  Check in and rooming questions will be done via phone  We will give you directions when to enter for your appointment  Inside office # provided:  Suri Wadsworth line: [phone]  [place] line:  [phone]  [facility] line:  [phone] line:  [phone]  Joaquín Guerin line:  [phone]  [place] line:  [phone]    IF you are not feeling well- cough, fever, shortness of breath or any flu like symptoms, contact your primary care physician or [phone] Javon Ines    Any questions with these instructions please call Maternal Fetal Medicine nurse line today @ # [phone]

## 2020-08-25 NOTE — ASSESSMENT & PLAN NOTE
- s/p MFM Consultation  Reviewed Planned fetal surveillance:  Ultrasounds for growth, and fluid are suggested every 4 weeks after 28 weeks  If there is discordancy greater than 20%, then more frequent surveillance may be indicated  Weekly NST or BPP and BRANDI beginning at 32 weeks unless either or both of the fetuses are <10% or there is a discordancy rate of 20% or more  - Patient taking ASA 81 mg daily for pre-eclampsia prevention

## 2020-08-25 NOTE — ASSESSMENT & PLAN NOTE
- History of abnormal 1 hr GTT in first pregnancy, normal 3 hr  - Reviewed normal early 1 hour GTT this pregnancy

## 2020-08-25 NOTE — ASSESSMENT & PLAN NOTE
- Briefly discussed desired mode of delivery today, including risks and benefits of TOLAC (e g   Risk of uterine rupture )  Patient desires scheduled repeat  delivery

## 2020-08-25 NOTE — PATIENT INSTRUCTIONS
Patient education: Having twins    What are identical twins and how do they happen? Identical twins happen when 1 egg is fertilized by 1 sperm and then splits (soon after it is fertilized) to form 2 embryos  An "embryo" is the term doctors use to describe when a baby starts to grow  Each embryo grows into a baby  Identical twins have all of the same genes  They are the same sex and look very much alike  Identical twins are less common than fraternal twins  What are fraternal twins and how do they happen? Fraternal twins happen when 2 different eggs are fertilized by 2 different sperm  Then there are 2 embryos, each of which grows into a baby  Fraternal twins share some, but not all, of the same genes  They are like any 2 siblings, but just share the same birthday  Fraternal twins can be 2 girls, 2 boys, or a boy and a girl  What increases the chances of having twins? Certain things increase your chances of having twins  These include:  ?Being in your 35s or older  ? Taking medicine to help get pregnant  ? Having a procedure called "in vitro fertilization" (also called "IVF") to help get pregnant  ? Your race - Twins are more common in black women than in  or white women  ? Having a family history of twins    These things mostly increase the chances of having fraternal twins only, not identical twins  How does my doctor or midwife know that I'm having twins? When your doctor or midwife examines you, they will feel the size of your uterus (womb) (figure 1)  People who are having twins usually have a uterus that is bigger than expected, given their due date  Also, early in pregnancy, your doctor or midwife will probably do a test called an ultrasound  An ultrasound uses sound waves to create pictures of the inside of your body  This test shows how many babies are in your uterus  Will I need special prenatal care? Yes  If you are pregnant with twins, you will need special prenatal care   You will see your doctor or midwife more often  You will also have frequent ultrasounds to check how your babies are growing  Later on in pregnancy, you might have other tests to check your babies' health  What should I know about having twins? Having twins is a bit different than having only 1 baby  Your doctor or midwife will talk with you about how much weight you should gain and how active you should be  They will also talk with you about problems that are more likely to happen when a person is pregnant with twins  The most common problem is that the babies will be born too early, before 42 weeks of pregnancy (3 or more weeks before the due date)  Doctors use the word "premature" or "" for babies who are born too early  Being born too early is sometimes a problem   babies are more likely to be smaller and need to stay in the hospital for longer after birth  They are also more likely to have medical problems, such as breathing problems  If your twins are identical, your doctor or midwife will use ultrasounds to check for a problem called "twin-to-twin transfusion syndrome " This is a problem with the placenta that can sometimes happen in a pregnancy with identical twins  (The placenta is the organ inside the uterus that brings a baby nutrients and oxygen, and carries away waste ) If you have this problem, your doctor or midwife will monitor you closely and suggest treatment if needed  Women who are carrying twins are also more likely to get high blood pressure during pregnancy  How will I deliver my babies? It depends on the position of the babies in your uterus, your health, and your babies' health  If you and your babies are healthy and the first baby is coming head first, you might be able to deliver your babies vaginally  If not, the doctor will probably do surgery called a  to get the babies out      How do I know if my twins are identical or fraternal?  Twins of the opposite sex are always fraternal  Twins of the same sex can be fraternal or identical     If your twins are the same sex, your doctor or midwife might be able to tell if they are identical by checking your placenta  They can use ultrasound to do this before birth, or examine the placenta after birth  Sometimes, the only way to tell if twins are identical is by doing blood tests on the babies after they are born  Can I breastfeed my babies? If you would like to  Most women make enough milk to breastfeed twins  If your babies are born too early, you might need to pump and store your breast milk until your babies are able to drink it  You will probably want to work with a breastfeeding expert, called a "lactation consultant " That way, you can find a schedule and way of breastfeeding that works best for you and your babies  What if I am having a tough time taking care of my babies? If you are having a tough time, try to get some help  Taking care of twins can be tiring and stressful  You might want to ask friends or relatives to help you  Many parents also find it helpful to join a support group for parents of twins  That way, they can talk to others in the same situation

## 2020-08-25 NOTE — PROGRESS NOTES
Initial Prenatal Visit  [facility] [address] [place] [address], [address]  [place] [phone]    Assessment/Plan:  Yessica Perea is a 30y o  year old  at 14w10d with dichorionic twin pregnancy, BMI 44, and history of prior  delivery who presents for initial prenatal visit  Supervision of normal pregnancy  - Prenatal labs: No results found for: Rogelio Grant  - Routine cervical cancer screening: Pap Up to date   - STI Screening: G/C Chlamydia sent today  - Patient Education:  Provided ACOG Patient Education on Multiple Gestation  Patient was counseled regarding diet, exercise, weight gain, foods to avoid, vaccines in pregnancy, trisomy screening, travel precautions to include seat belt use and VTE risk reduction  She has been provided our pregnancy packet which includes how and when to contact providers, medication recommendations, dietary suggestions, breastfeeding information as well as websites for additional information, hospital and delivery concerns  - Aneuploidy screening discussed  Patient has follow up with MFM tomorrow to discuss further  Additional Pregnancy Problems:   Dichorionic diamniotic twin pregnancy in first trimester  - s/p MFM Consultation  Reviewed Planned fetal surveillance:  Ultrasounds for growth, and fluid are suggested every 4 weeks after 28 weeks  If there is discordancy greater than 20%, then more frequent surveillance may be indicated  Weekly NST or BPP and BRANDI beginning at 32 weeks unless either or both of the fetuses are <10% or there is a discordancy rate of 20% or more  - Patient taking ASA 81 mg daily for pre-eclampsia prevention      Impaired glucose tolerance  - History of abnormal 1 hr GTT in first pregnancy, normal 3 hr  - Reviewed normal early 1 hour GTT this pregnancy    BMI 39 0-39 9,adult  - Reviewed normal early 1 hour GTT this pregnancy    S/P  section  - Briefly discussed desired mode of delivery today, including risks and benefits of TOLAC (e g   Risk of uterine rupture )  Patient desires scheduled repeat  delivery  Diagnoses and all orders for this visit:    Dichorionic diamniotic twin pregnancy in first trimester  -     Urine culture  -     Chlamydia/GC amplified DNA by PCR  -     aspirin (ECOTRIN LOW STRENGTH) 81 mg EC tablet; Take 1 tablet (81 mg total) by mouth daily For pre-eclampsia prevention in pregnancy  Subjective:   Tamara Godoy is a 30 y o  Antionette Ross female who presents for prenatal care  Pregnancy ROS: No leakage of fluid, pelvic pain, or vaginal bleeding  No nausea/vomiting  The following portions of the patient's history were reviewed and updated as appropriate: allergies, current medications, past family history, past medical history, obstetric history, gynecologic history, past social history, past surgical history and problem list       Objective:  /66   Wt 106 kg (234 lb)   LMP 2020   BMI 40 17 kg/m²   Pregravid Weight/BMI: Pregravid weight not on file (BMI Could not be calculated)  Current Weight: 106 kg (234 lb)   Total Weight Gain: Not found  Pre- Vitals      Most Recent Value   Prenatal Assessment   Fetal Heart Rate  150 A // 148 B   Prenatal Vitals   Blood Pressure  118/66   Weight - Scale  106 kg (234 lb)   Urine Albumin/Glucose   Dilation/Effacement/Station   Vaginal Drainage   Edema   LLE Edema  None   RLE Edema  None         General: Well appearing, no distress  Respiratory: Normal respiratory rate, lungs clear to auscultation, no wheezing or rales  Cardiovascular: Regular rate and rhythm, no murmurs, rubs, or gallops  Breasts: Normal bilaterally, nontender without masses, asymmetry, or nipple discharge  Abdomen: Soft, gravid, nontender  : Urethra normal  Normal labia majora and minora  Vagina normal   No vaginal bleeding  No vaginal discharge  Cervix visually closed  Extremities: Warm and well perfused  Non tender  No edema      [facility] [place] Valencia, [address]  8/25/2020 9:49 AM

## 2020-08-26 ENCOUNTER — ROUTINE PRENATAL (OUTPATIENT)
Dept: PERINATAL CARE | Facility: CLINIC | Age: 30
End: 2020-08-26
Payer: COMMERCIAL

## 2020-08-26 VITALS
SYSTOLIC BLOOD PRESSURE: 129 MMHG | HEIGHT: 64 IN | WEIGHT: 232.8 LBS | BODY MASS INDEX: 39.75 KG/M2 | TEMPERATURE: 98 F | HEART RATE: 85 BPM | DIASTOLIC BLOOD PRESSURE: 65 MMHG

## 2020-08-26 DIAGNOSIS — Z36.82 ENCOUNTER FOR NUCHAL TRANSLUCENCY TESTING: ICD-10-CM

## 2020-08-26 DIAGNOSIS — O30.041 DICHORIONIC DIAMNIOTIC TWIN PREGNANCY IN FIRST TRIMESTER: Primary | ICD-10-CM

## 2020-08-26 DIAGNOSIS — Z3A.12 12 WEEKS GESTATION OF PREGNANCY: ICD-10-CM

## 2020-08-26 LAB — BACTERIA UR CULT: NORMAL

## 2020-08-26 PROCEDURE — 76802 OB US < 14 WKS ADDL FETUS: CPT | Performed by: OBSTETRICS & GYNECOLOGY

## 2020-08-26 PROCEDURE — 76813 OB US NUCHAL MEAS 1 GEST: CPT | Performed by: OBSTETRICS & GYNECOLOGY

## 2020-08-26 PROCEDURE — 76814 OB US NUCHAL MEAS ADD-ON: CPT | Performed by: OBSTETRICS & GYNECOLOGY

## 2020-08-26 PROCEDURE — 76801 OB US < 14 WKS SINGLE FETUS: CPT | Performed by: OBSTETRICS & GYNECOLOGY

## 2020-08-26 PROCEDURE — 99213 OFFICE O/P EST LOW 20 MIN: CPT | Performed by: OBSTETRICS & GYNECOLOGY

## 2020-08-26 RX ORDER — CALCIUM CARBONATE 600 MG
TABLET ORAL
COMMUNITY
Start: 2020-08-05 | End: 2021-01-07 | Stop reason: SDUPTHER

## 2020-08-26 NOTE — PROGRESS NOTES
The patient was seen today for an ultrasound  Please see ultrasound report (located under Ob Procedures) for additional details  Thank you very much for allowing us to participate in the care of this very nice patient  Should you have any questions, please do not hesitate to contact me  MD Anna [place]  Attending Physician, Kaleb

## 2020-08-28 LAB
C TRACH DNA SPEC QL NAA+PROBE: NEGATIVE
N GONORRHOEA DNA SPEC QL NAA+PROBE: NEGATIVE

## 2020-09-02 ENCOUNTER — TELEPHONE (OUTPATIENT)
Dept: PERINATAL CARE | Facility: CLINIC | Age: 30
End: 2020-09-02

## 2020-09-02 NOTE — TELEPHONE ENCOUNTER
----- Message from Brenda Mcallister MD sent at 9/1/2020  3:16 PM EDT -----  I have reviewed the results which are low risk  Please call patient and notify her of reassuring results if she has not viewed on [employer]  Thank you

## 2020-09-02 NOTE — LETTER
09/02/20  Flavia Padilla Junior  1990    Thank you for completing Part 1 of your Sequential Screen  To obtain a complete test result, please complete blood work for Part 2 Sequential Screen between the weeks of 9/13/2020 to 9/27/2020  Please verify a laboratory In Network with your insurance plan [facility] or [employer])  If you choose to use a [facility] lab, below are the available sites to have this blood work drawn  Call our office for any questions at [phone]      [address] [place] [address] Avenue  1492 [facility], [facility], [address]   300 [facility], [place], [address]  Phone:  [phone]     Phone:  [phone]    [address], [address] [place] [address]  Phone: [phone]      Phone: [phone] Gaston Quirozask for lab)    53 [facility]  59 [facility], [facility], [address] [place] [address]   700 [facility], [facility] [address]  Phone: [phone]      Phone:  [phone]    [address]  [address]  Phone: [phone]      Phone:  793 [facility],[address]Th [facility]  207 [facility], [facility], [address]   819 [facility], Cher[address]  Phone: [phone]      Phone: [phone]    [address]     1896 [facility]  1430 [facility] [address], [place], [address]  Phone:  [phone]     Phone: [phone]    [address] [place] [address] [address]  Phone: [phone]    Sincerely,    Edwin Wu RN

## 2020-09-02 NOTE — TELEPHONE ENCOUNTER
Spoke with pt and informed her of part 1 Sequential Screen  Pt instructed on part 2  PT receptive to information and declines questions at this time  TRF mailed

## 2020-09-21 NOTE — PROGRESS NOTES
17 weeks    Sonogram  - 8/26- Normal growth   Sequential screening part 1 normal   For part to - recommend between 16-18 weeks   Next sonogram is 10/14/20  Start surveillance at minimal 32 weeks     Early GTT - normal will repeat at 28 weeks       Previous C section - primary maternal request for suspected macrosomia - baby was 8 10  Desires this time repeat c section and tubal ligation   She is over all doing well some decrease in sleep but still getting 8 hours

## 2020-09-23 ENCOUNTER — ROUTINE PRENATAL (OUTPATIENT)
Dept: OBGYN CLINIC | Facility: MEDICAL CENTER | Age: 30
End: 2020-09-23

## 2020-09-23 ENCOUNTER — APPOINTMENT (OUTPATIENT)
Dept: LAB | Facility: MEDICAL CENTER | Age: 30
End: 2020-09-23
Payer: COMMERCIAL

## 2020-09-23 ENCOUNTER — TRANSCRIBE ORDERS (OUTPATIENT)
Dept: ADMINISTRATIVE | Facility: HOSPITAL | Age: 30
End: 2020-09-23

## 2020-09-23 DIAGNOSIS — Z36.9 UNSPECIFIED ANTENATAL SCREENING: ICD-10-CM

## 2020-09-23 DIAGNOSIS — O30.041 DICHORIONIC DIAMNIOTIC TWIN PREGNANCY IN FIRST TRIMESTER: Primary | ICD-10-CM

## 2020-09-23 DIAGNOSIS — Z33.1 PREGNANT STATE, INCIDENTAL: Primary | ICD-10-CM

## 2020-09-23 DIAGNOSIS — Z33.1 PREGNANT STATE, INCIDENTAL: ICD-10-CM

## 2020-09-23 PROCEDURE — PNV: Performed by: OBSTETRICS & GYNECOLOGY

## 2020-09-23 PROCEDURE — 36415 COLL VENOUS BLD VENIPUNCTURE: CPT

## 2020-09-24 LAB — SCAN RESULT: NORMAL

## 2020-09-29 ENCOUNTER — TELEPHONE (OUTPATIENT)
Dept: PERINATAL CARE | Facility: CLINIC | Age: 30
End: 2020-09-29

## 2020-09-29 NOTE — TELEPHONE ENCOUNTER
----- Message from Sujatha Clifford MD sent at 9/29/2020  8:04 AM EDT -----  I have reviewed the results which are low risk  Please call patient and notify her of reassuring results if she has not viewed on [employer]  Thank you  Nazai, Admitting notified of new order to admit (Dr. Matos) and bed assignment ([facility] - 31 B).

## 2020-10-12 PROBLEM — O30.042 DICHORIONIC DIAMNIOTIC TWIN PREGNANCY IN SECOND TRIMESTER: Status: ACTIVE | Noted: 2020-08-05

## 2020-10-13 ENCOUNTER — TELEPHONE (OUTPATIENT)
Dept: PERINATAL CARE | Facility: CLINIC | Age: 30
End: 2020-10-13

## 2020-10-14 ENCOUNTER — ULTRASOUND (OUTPATIENT)
Dept: PERINATAL CARE | Facility: CLINIC | Age: 30
End: 2020-10-14
Payer: COMMERCIAL

## 2020-10-14 VITALS
SYSTOLIC BLOOD PRESSURE: 114 MMHG | TEMPERATURE: 99.8 F | BODY MASS INDEX: 41.35 KG/M2 | HEART RATE: 102 BPM | HEIGHT: 64 IN | WEIGHT: 242.2 LBS | DIASTOLIC BLOOD PRESSURE: 80 MMHG

## 2020-10-14 DIAGNOSIS — O30.042 DICHORIONIC DIAMNIOTIC TWIN PREGNANCY IN SECOND TRIMESTER: Primary | ICD-10-CM

## 2020-10-14 DIAGNOSIS — Z36.3 ENCOUNTER FOR ANTENATAL SCREENING FOR MALFORMATIONS: ICD-10-CM

## 2020-10-14 DIAGNOSIS — Z36.86 ENCOUNTER FOR ANTENATAL SCREENING FOR CERVICAL LENGTH: ICD-10-CM

## 2020-10-14 DIAGNOSIS — O34.219 HISTORY OF CESAREAN DELIVERY, ANTEPARTUM: ICD-10-CM

## 2020-10-14 PROCEDURE — 99212 OFFICE O/P EST SF 10 MIN: CPT | Performed by: OBSTETRICS & GYNECOLOGY

## 2020-10-14 PROCEDURE — 76811 OB US DETAILED SNGL FETUS: CPT | Performed by: OBSTETRICS & GYNECOLOGY

## 2020-10-14 PROCEDURE — 76817 TRANSVAGINAL US OBSTETRIC: CPT | Performed by: OBSTETRICS & GYNECOLOGY

## 2020-10-14 PROCEDURE — 76812 OB US DETAILED ADDL FETUS: CPT | Performed by: OBSTETRICS & GYNECOLOGY

## 2020-10-21 ENCOUNTER — ROUTINE PRENATAL (OUTPATIENT)
Dept: OBGYN CLINIC | Facility: MEDICAL CENTER | Age: 30
End: 2020-10-21
Payer: COMMERCIAL

## 2020-10-21 VITALS — WEIGHT: 243.2 LBS | BODY MASS INDEX: 41.75 KG/M2

## 2020-10-21 DIAGNOSIS — Z98.891 S/P CESAREAN SECTION: ICD-10-CM

## 2020-10-21 DIAGNOSIS — O30.042 DICHORIONIC DIAMNIOTIC TWIN PREGNANCY IN SECOND TRIMESTER: Primary | ICD-10-CM

## 2020-10-21 DIAGNOSIS — Z34.92 SECOND TRIMESTER PREGNANCY: ICD-10-CM

## 2020-10-21 PROCEDURE — 90471 IMMUNIZATION ADMIN: CPT

## 2020-10-21 PROCEDURE — PNV: Performed by: OBSTETRICS & GYNECOLOGY

## 2020-10-21 PROCEDURE — 90686 IIV4 VACC NO PRSV 0.5 ML IM: CPT

## 2020-10-29 ENCOUNTER — TELEPHONE (OUTPATIENT)
Dept: OBGYN CLINIC | Facility: MEDICAL CENTER | Age: 30
End: 2020-10-29

## 2020-11-10 ENCOUNTER — ROUTINE PRENATAL (OUTPATIENT)
Dept: OBGYN CLINIC | Facility: MEDICAL CENTER | Age: 30
End: 2020-11-10

## 2020-11-10 ENCOUNTER — TELEPHONE (OUTPATIENT)
Dept: PERINATAL CARE | Facility: CLINIC | Age: 30
End: 2020-11-10

## 2020-11-10 VITALS
SYSTOLIC BLOOD PRESSURE: 120 MMHG | DIASTOLIC BLOOD PRESSURE: 76 MMHG | HEIGHT: 64 IN | TEMPERATURE: 97.3 F | BODY MASS INDEX: 42.68 KG/M2 | WEIGHT: 250 LBS

## 2020-11-10 DIAGNOSIS — Z3A.23 23 WEEKS GESTATION OF PREGNANCY: Primary | ICD-10-CM

## 2020-11-10 DIAGNOSIS — O30.042 DICHORIONIC DIAMNIOTIC TWIN PREGNANCY IN SECOND TRIMESTER: ICD-10-CM

## 2020-11-10 PROCEDURE — PNV: Performed by: ADVANCED PRACTICE MIDWIFE

## 2020-11-11 ENCOUNTER — ULTRASOUND (OUTPATIENT)
Dept: PERINATAL CARE | Facility: CLINIC | Age: 30
End: 2020-11-11
Payer: COMMERCIAL

## 2020-11-11 VITALS
WEIGHT: 250.4 LBS | DIASTOLIC BLOOD PRESSURE: 79 MMHG | TEMPERATURE: 96.9 F | HEIGHT: 64 IN | BODY MASS INDEX: 42.75 KG/M2 | SYSTOLIC BLOOD PRESSURE: 115 MMHG | HEART RATE: 96 BPM

## 2020-11-11 DIAGNOSIS — O30.042 DICHORIONIC DIAMNIOTIC TWIN PREGNANCY IN SECOND TRIMESTER: Primary | ICD-10-CM

## 2020-11-11 DIAGNOSIS — Z3A.23 23 WEEKS GESTATION OF PREGNANCY: ICD-10-CM

## 2020-11-11 DIAGNOSIS — O99.212 MATERNAL OBESITY, ANTEPARTUM, SECOND TRIMESTER: ICD-10-CM

## 2020-11-11 PROCEDURE — 76816 OB US FOLLOW-UP PER FETUS: CPT | Performed by: OBSTETRICS & GYNECOLOGY

## 2020-11-11 PROCEDURE — 99212 OFFICE O/P EST SF 10 MIN: CPT | Performed by: OBSTETRICS & GYNECOLOGY

## 2020-12-04 ENCOUNTER — TELEPHONE (OUTPATIENT)
Dept: OBGYN CLINIC | Facility: MEDICAL CENTER | Age: 30
End: 2020-12-04

## 2020-12-04 ENCOUNTER — ROUTINE PRENATAL (OUTPATIENT)
Dept: OBGYN CLINIC | Facility: MEDICAL CENTER | Age: 30
End: 2020-12-04
Payer: COMMERCIAL

## 2020-12-04 VITALS — DIASTOLIC BLOOD PRESSURE: 72 MMHG | BODY MASS INDEX: 43.94 KG/M2 | SYSTOLIC BLOOD PRESSURE: 130 MMHG | WEIGHT: 256 LBS

## 2020-12-04 DIAGNOSIS — O99.212 MATERNAL OBESITY, ANTEPARTUM, SECOND TRIMESTER: ICD-10-CM

## 2020-12-04 DIAGNOSIS — Z30.2 REQUEST FOR STERILIZATION: ICD-10-CM

## 2020-12-04 DIAGNOSIS — O30.042 DICHORIONIC DIAMNIOTIC TWIN PREGNANCY IN SECOND TRIMESTER: ICD-10-CM

## 2020-12-04 DIAGNOSIS — Z23 NEED FOR DIPHTHERIA-TETANUS-PERTUSSIS (TDAP) VACCINE: ICD-10-CM

## 2020-12-04 DIAGNOSIS — Z98.891 HISTORY OF CESAREAN DELIVERY: ICD-10-CM

## 2020-12-04 DIAGNOSIS — Z34.92 SECOND TRIMESTER PREGNANCY: ICD-10-CM

## 2020-12-04 DIAGNOSIS — Z3A.27 27 WEEKS GESTATION OF PREGNANCY: Primary | ICD-10-CM

## 2020-12-04 LAB
BASOPHILS # BLD AUTO: 0.02 THOUSANDS/ΜL (ref 0–0.1)
BASOPHILS NFR BLD AUTO: 0 % (ref 0–1)
EOSINOPHIL # BLD AUTO: 0.5 THOUSAND/ΜL (ref 0–0.61)
EOSINOPHIL NFR BLD AUTO: 5 % (ref 0–6)
ERYTHROCYTE [DISTWIDTH] IN BLOOD BY AUTOMATED COUNT: 13.5 % (ref 11.6–15.1)
GLUCOSE 1H P 50 G GLC PO SERPL-MCNC: 116 MG/DL
HCT VFR BLD AUTO: 36 % (ref 34.8–46.1)
HGB BLD-MCNC: 11.9 G/DL (ref 11.5–15.4)
IMM GRANULOCYTES # BLD AUTO: 0.12 THOUSAND/UL (ref 0–0.2)
IMM GRANULOCYTES NFR BLD AUTO: 1 % (ref 0–2)
LYMPHOCYTES # BLD AUTO: 1.54 THOUSANDS/ΜL (ref 0.6–4.47)
LYMPHOCYTES NFR BLD AUTO: 15 % (ref 14–44)
MCH RBC QN AUTO: 31.1 PG (ref 26.8–34.3)
MCHC RBC AUTO-ENTMCNC: 33.1 G/DL (ref 31.4–37.4)
MCV RBC AUTO: 94 FL (ref 82–98)
MONOCYTES # BLD AUTO: 0.57 THOUSAND/ΜL (ref 0.17–1.22)
MONOCYTES NFR BLD AUTO: 6 % (ref 4–12)
NEUTROPHILS # BLD AUTO: 7.53 THOUSANDS/ΜL (ref 1.85–7.62)
NEUTS SEG NFR BLD AUTO: 73 % (ref 43–75)
NRBC BLD AUTO-RTO: 0 /100 WBCS
PLATELET # BLD AUTO: 179 THOUSANDS/UL (ref 149–390)
PMV BLD AUTO: 10.6 FL (ref 8.9–12.7)
RBC # BLD AUTO: 3.83 MILLION/UL (ref 3.81–5.12)
WBC # BLD AUTO: 10.28 THOUSAND/UL (ref 4.31–10.16)

## 2020-12-04 PROCEDURE — 82950 GLUCOSE TEST: CPT | Performed by: OBSTETRICS & GYNECOLOGY

## 2020-12-04 PROCEDURE — 85025 COMPLETE CBC W/AUTO DIFF WBC: CPT | Performed by: OBSTETRICS & GYNECOLOGY

## 2020-12-04 PROCEDURE — 90715 TDAP VACCINE 7 YRS/> IM: CPT | Performed by: OBSTETRICS & GYNECOLOGY

## 2020-12-04 PROCEDURE — 36415 COLL VENOUS BLD VENIPUNCTURE: CPT | Performed by: OBSTETRICS & GYNECOLOGY

## 2020-12-04 PROCEDURE — 90471 IMMUNIZATION ADMIN: CPT | Performed by: OBSTETRICS & GYNECOLOGY

## 2020-12-04 PROCEDURE — PNV: Performed by: OBSTETRICS & GYNECOLOGY

## 2020-12-07 ENCOUNTER — TELEPHONE (OUTPATIENT)
Dept: PERINATAL CARE | Facility: CLINIC | Age: 30
End: 2020-12-07

## 2020-12-08 ENCOUNTER — ULTRASOUND (OUTPATIENT)
Dept: PERINATAL CARE | Facility: CLINIC | Age: 30
End: 2020-12-08
Payer: COMMERCIAL

## 2020-12-08 VITALS
WEIGHT: 259 LBS | BODY MASS INDEX: 44.22 KG/M2 | SYSTOLIC BLOOD PRESSURE: 135 MMHG | DIASTOLIC BLOOD PRESSURE: 68 MMHG | HEART RATE: 83 BPM | HEIGHT: 64 IN

## 2020-12-08 DIAGNOSIS — O30.042 DICHORIONIC DIAMNIOTIC TWIN PREGNANCY IN SECOND TRIMESTER: Primary | ICD-10-CM

## 2020-12-08 DIAGNOSIS — Z3A.27 27 WEEKS GESTATION OF PREGNANCY: ICD-10-CM

## 2020-12-08 DIAGNOSIS — Z36.89 ENCOUNTER FOR ULTRASOUND TO ASSESS FETAL GROWTH: ICD-10-CM

## 2020-12-08 DIAGNOSIS — O99.212 MATERNAL OBESITY, ANTEPARTUM, SECOND TRIMESTER: ICD-10-CM

## 2020-12-08 PROBLEM — O35.EXX0 PYELECTASIS OF FETUS ON PRENATAL ULTRASOUND: Status: ACTIVE | Noted: 2020-12-08

## 2020-12-08 PROBLEM — O40.2XX2 POLYHYDRAMNIOS IN SECOND TRIMESTER, ANTEPARTUM COMPLICATION, FETUS 2: Status: ACTIVE | Noted: 2020-12-08

## 2020-12-08 PROBLEM — O35.8XX0 PYELECTASIS OF FETUS ON PRENATAL ULTRASOUND: Status: ACTIVE | Noted: 2020-12-08

## 2020-12-08 PROCEDURE — 99213 OFFICE O/P EST LOW 20 MIN: CPT | Performed by: OBSTETRICS & GYNECOLOGY

## 2020-12-08 PROCEDURE — 76816 OB US FOLLOW-UP PER FETUS: CPT | Performed by: OBSTETRICS & GYNECOLOGY

## 2020-12-18 ENCOUNTER — ROUTINE PRENATAL (OUTPATIENT)
Dept: OBGYN CLINIC | Facility: MEDICAL CENTER | Age: 30
End: 2020-12-18

## 2020-12-18 VITALS — DIASTOLIC BLOOD PRESSURE: 78 MMHG | WEIGHT: 261.6 LBS | BODY MASS INDEX: 44.9 KG/M2 | SYSTOLIC BLOOD PRESSURE: 124 MMHG

## 2020-12-18 DIAGNOSIS — Z30.2 REQUEST FOR STERILIZATION: ICD-10-CM

## 2020-12-18 DIAGNOSIS — O30.041 DICHORIONIC DIAMNIOTIC TWIN PREGNANCY IN FIRST TRIMESTER: Primary | ICD-10-CM

## 2020-12-18 PROCEDURE — PNV: Performed by: OBSTETRICS & GYNECOLOGY

## 2020-12-22 ENCOUNTER — TELEPHONE (OUTPATIENT)
Dept: OBGYN CLINIC | Facility: MEDICAL CENTER | Age: 30
End: 2020-12-22

## 2020-12-28 ENCOUNTER — ROUTINE PRENATAL (OUTPATIENT)
Dept: OBGYN CLINIC | Facility: MEDICAL CENTER | Age: 30
End: 2020-12-28

## 2020-12-28 VITALS — BODY MASS INDEX: 45.57 KG/M2 | SYSTOLIC BLOOD PRESSURE: 128 MMHG | WEIGHT: 265.5 LBS | DIASTOLIC BLOOD PRESSURE: 74 MMHG

## 2020-12-28 DIAGNOSIS — Z3A.30 30 WEEKS GESTATION OF PREGNANCY: ICD-10-CM

## 2020-12-28 DIAGNOSIS — Z98.891 HISTORY OF CESAREAN DELIVERY: ICD-10-CM

## 2020-12-28 DIAGNOSIS — O30.043 DICHORIONIC DIAMNIOTIC TWIN PREGNANCY IN THIRD TRIMESTER: Primary | ICD-10-CM

## 2020-12-28 DIAGNOSIS — O99.213 MATERNAL OBESITY SYNDROME IN THIRD TRIMESTER: ICD-10-CM

## 2020-12-28 PROCEDURE — PNV: Performed by: ADVANCED PRACTICE MIDWIFE

## 2021-01-06 ENCOUNTER — TELEPHONE (OUTPATIENT)
Dept: PERINATAL CARE | Facility: OTHER | Age: 31
End: 2021-01-06

## 2021-01-06 NOTE — TELEPHONE ENCOUNTER
Attempted to reach patient by phone and left voicemail to confirm appointment for MFM ultrasound  1 support person ( must be over the age of 15) may accompany you for your appointment  If you or your support person have traveled outside the state in the past 2 weeks, please call and notify our office today #[phone]  You and your support person must wear a mask ,covering nose and mouth,during your entire visit  To minimize your exposure in our waiting room, please call our office prior to entering the building  Check in and rooming questions will be done via phone  We will give you directions when to enter for your appointment  [place]: [phone]    IF you are not feeling well- cough, fever, shortness of breath or any flu like symptoms, contact your primary care physician or [phone]  If you are awaiting COVID 19 test results please call and reschedule your appointment    Any questions with these instructions please call Maternal Fetal Medicine nurse line today @ # [phone]

## 2021-01-07 ENCOUNTER — ROUTINE PRENATAL (OUTPATIENT)
Dept: PERINATAL CARE | Facility: CLINIC | Age: 31
End: 2021-01-07

## 2021-01-07 ENCOUNTER — ULTRASOUND (OUTPATIENT)
Dept: PERINATAL CARE | Facility: CLINIC | Age: 31
End: 2021-01-07
Payer: COMMERCIAL

## 2021-01-07 VITALS — WEIGHT: 264.6 LBS | HEIGHT: 64 IN | BODY MASS INDEX: 45.17 KG/M2

## 2021-01-07 DIAGNOSIS — O99.213 MATERNAL OBESITY, ANTEPARTUM, THIRD TRIMESTER: ICD-10-CM

## 2021-01-07 DIAGNOSIS — Z3A.32 32 WEEKS GESTATION OF PREGNANCY: Primary | ICD-10-CM

## 2021-01-07 DIAGNOSIS — O30.043 DICHORIONIC DIAMNIOTIC TWIN PREGNANCY IN THIRD TRIMESTER: ICD-10-CM

## 2021-01-07 PROBLEM — R73.02 IMPAIRED GLUCOSE TOLERANCE: Status: RESOLVED | Noted: 2020-08-24 | Resolved: 2021-01-07

## 2021-01-07 PROBLEM — O40.2XX2 POLYHYDRAMNIOS IN SECOND TRIMESTER, ANTEPARTUM COMPLICATION, FETUS 2: Status: RESOLVED | Noted: 2020-12-08 | Resolved: 2021-01-07

## 2021-01-07 PROBLEM — O35.EXX0 PYELECTASIS OF FETUS ON PRENATAL ULTRASOUND: Status: RESOLVED | Noted: 2020-12-08 | Resolved: 2021-01-07

## 2021-01-07 PROBLEM — O35.8XX0 PYELECTASIS OF FETUS ON PRENATAL ULTRASOUND: Status: RESOLVED | Noted: 2020-12-08 | Resolved: 2021-01-07

## 2021-01-07 PROCEDURE — 59025 FETAL NON-STRESS TEST: CPT | Performed by: OBSTETRICS & GYNECOLOGY

## 2021-01-07 PROCEDURE — 76816 OB US FOLLOW-UP PER FETUS: CPT | Performed by: OBSTETRICS & GYNECOLOGY

## 2021-01-07 PROCEDURE — 99213 OFFICE O/P EST LOW 20 MIN: CPT | Performed by: OBSTETRICS & GYNECOLOGY

## 2021-01-07 PROCEDURE — NC001 PR NO CHARGE

## 2021-01-07 NOTE — PATIENT INSTRUCTIONS
Kick Counts in Pregnancy   AMBULATORY CARE:   Kick counts  measure how much your baby is moving in your womb  A kick from your baby can be felt as a twist, turn, swish, roll, or jab  It is common to feel your baby kicking at 26 to 28 weeks of pregnancy  You may feel your baby kick as early as 20 weeks of pregnancy  You may want to start counting at 28 weeks  Contact your healthcare provider immediately if:   · You feel a change in the number of kicks or movements of your baby  · You feel fewer than 10 kicks within 2 hours  · You have questions or concerns about your baby's movements  Why measure kick counts:  Your baby's movement may provide information about your baby's health  He or she may move less, or not at all, if there are problems  Your baby may move less if he or she is not getting enough oxygen or nutrition from the placenta  Do not smoke while you are pregnant  Smoking decreases the amount of oxygen that gets to your baby  Talk to your healthcare provider if you need help to quit smoking  Tell your healthcare provider as soon as you feel a change in your baby's movements  When to measure kick counts:   · Measure kick counts at the same time every day  · Measure kick counts when your baby is awake and most active  Your baby may be most active in the evening  How to measure kick counts:  Check that your baby is awake before you measure kick counts  You can wake up your baby by lightly pushing on your belly, walking, or drinking something cold  Your healthcare provider may tell you different ways to measure kick counts  You may be told to do the following:  · Use a chart or clock to keep track of the time you start and finish counting  · Sit in a chair or lie on your left side  · Place your hands on the largest part of your belly  · Count until you reach 10 kicks  Write down how much time it takes to count 10 kicks  · It may take 30 minutes to 2 hours to count 10 kicks  It should not take more than 2 hours to count 10 kicks  Follow up with your healthcare provider as directed:  Write down your questions so you remember to ask them during your visits  © Copyright [address] Information is for End User's use only and may not be sold, redistributed or otherwise used for commercial purposes  All illustrations and images included in CareNotes® are the copyrighted property of A D A M , Inc  or [facility] [employer]   The above information is an  only  It is not intended as medical advice for individual conditions or treatments  Talk to your doctor, nurse or pharmacist before following any medical regimen to see if it is safe and effective for you  Nonstress Test for Pregnancy   WHAT YOU NEED TO KNOW:   What do I need to know about a nonstress test?  A nonstress test measures your baby's heart rate and movements  Nonstress means that no stress will be placed on your baby during the test    How do I prepare for a nonstress test?  Your healthcare provider will talk to you about how to prepare for this test  He may tell you to eat and drink plenty of fluids before your test  If you smoke, you may be asked not to smoke within 2 hours before the test  He will also tell you what medicines to take or not take on the day of your test    What will happen during a nonstress test?  You may be asked to lie down or recline back for the test on a bed  One or two belts with sensors will be placed around your abdomen  Your baby's heart rate will be recorded with a machine  If your baby does not move, your baby may be asleep  Your healthcare provider may make a noise near your abdomen to try to wake your baby  The test usually takes about 20 minutes, but can take longer if your baby needs to be awakened  What do I need to know about the test results? Your baby will be expected to move at least twice for a certain amount of time   Your baby's heart rate will be expected to go up by a certain number of beats per minute during movement  If your baby does not move as expected, the test may need to be repeated or you may need other tests  CARE AGREEMENT:   You have the right to help plan your care  Learn about your health condition and how it may be treated  Discuss treatment options with your healthcare providers to decide what care you want to receive  You always have the right to refuse treatment  The above information is an  only  It is not intended as medical advice for individual conditions or treatments  Talk to your doctor, nurse or pharmacist before following any medical regimen to see if it is safe and effective for you  © Copyright [address] Information is for End User's use only and may not be sold, redistributed or otherwise used for commercial purposes   All illustrations and images included in CareNotes® are the copyrighted property of A D A M , Inc  or [address] [place] [address]

## 2021-01-13 ENCOUNTER — TELEPHONE (OUTPATIENT)
Dept: PERINATAL CARE | Facility: CLINIC | Age: 31
End: 2021-01-13

## 2021-01-13 NOTE — TELEPHONE ENCOUNTER
-------------------------------------------------------------    Attempted to reach patient by phone and left voicemail to confirm appointment for MFM ultrasound  1 support person ( must be over the age of 15) may accompany you for your appointment  If you or your support person have traveled outside the state in the past 2 weeks, please call and notify our office today #[phone]  You and your support person must wear a mask ,covering nose and mouth,during your entire visit  To minimize your exposure in our waiting room, please call our office prior to entering the building  Check in and rooming questions will be done via phone  We will give you directions when to enter for your appointment  [place]: [phone]    IF you are not feeling well- cough, fever, shortness of breath or any flu like symptoms, contact your primary care physician or [phone] Christy [place]  If you are awaiting COVID 19 test results please call and reschedule your appointment    Any questions with these instructions please call Maternal Fetal Medicine nurse line today @ # [phone]

## 2021-01-14 ENCOUNTER — ROUTINE PRENATAL (OUTPATIENT)
Dept: OBGYN CLINIC | Facility: MEDICAL CENTER | Age: 31
End: 2021-01-14

## 2021-01-14 ENCOUNTER — ULTRASOUND (OUTPATIENT)
Dept: PERINATAL CARE | Facility: OTHER | Age: 31
End: 2021-01-14
Payer: COMMERCIAL

## 2021-01-14 VITALS
BODY MASS INDEX: 45.27 KG/M2 | HEIGHT: 64 IN | HEART RATE: 88 BPM | WEIGHT: 265.2 LBS | DIASTOLIC BLOOD PRESSURE: 76 MMHG | SYSTOLIC BLOOD PRESSURE: 109 MMHG

## 2021-01-14 VITALS — SYSTOLIC BLOOD PRESSURE: 122 MMHG | BODY MASS INDEX: 45.71 KG/M2 | DIASTOLIC BLOOD PRESSURE: 70 MMHG | WEIGHT: 266.3 LBS

## 2021-01-14 DIAGNOSIS — O30.043 DICHORIONIC DIAMNIOTIC TWIN PREGNANCY IN THIRD TRIMESTER: Primary | ICD-10-CM

## 2021-01-14 DIAGNOSIS — O99.213 MATERNAL OBESITY, ANTEPARTUM, THIRD TRIMESTER: ICD-10-CM

## 2021-01-14 DIAGNOSIS — O30.043 DICHORIONIC DIAMNIOTIC TWIN PREGNANCY IN THIRD TRIMESTER: ICD-10-CM

## 2021-01-14 DIAGNOSIS — Z3A.33 33 WEEKS GESTATION OF PREGNANCY: Primary | ICD-10-CM

## 2021-01-14 DIAGNOSIS — Z30.2 REQUEST FOR STERILIZATION: ICD-10-CM

## 2021-01-14 DIAGNOSIS — Z98.891 HISTORY OF CESAREAN DELIVERY: ICD-10-CM

## 2021-01-14 PROCEDURE — 76815 OB US LIMITED FETUS(S): CPT | Performed by: OBSTETRICS & GYNECOLOGY

## 2021-01-14 PROCEDURE — 59025 FETAL NON-STRESS TEST: CPT | Performed by: OBSTETRICS & GYNECOLOGY

## 2021-01-14 PROCEDURE — PNV: Performed by: STUDENT IN AN ORGANIZED HEALTH CARE EDUCATION/TRAINING PROGRAM

## 2021-01-14 NOTE — PROGRESS NOTES
Routine Prenatal Visit  [facility] [address] [place] [address]    Assessment/Plan:  Irasema Whipple is a 26y o  year old  at 33w0d who presents for routine prenatal visit  1  33 weeks gestation of pregnancy  Assessment & Plan:  - APFS: Getting weekly NSTs and q4 week growth ultrasound through [facility]  - Delivery plan: rLTCS +BTL with Cancel at 38 weeks  - PNV in 2 weeks  - Patient Education: Fetal kick counts reviewed  2  Dichorionic diamniotic twin pregnancy in third trimester    3  History of  delivery    4  Request for sterilization        Subjective:     CC: Prenatal care    Kirill Davis is a 26 y o   female who presents for routine prenatal care at 33w0d  Pregnancy ROS: Denies leakage of fluid, pelvic pain, or vaginal bleeding  Reports active fetal movement x2  The following portions of the patient's history were reviewed and updated as appropriate: allergies, current medications, past family history, past medical history, obstetric history, gynecologic history, past social history, past surgical history and problem list       Objective:  /70   Wt 121 kg (266 lb 4 8 oz)   LMP 2020   BMI 45 71 kg/m²   Pregravid Weight/BMI: 105 kg (232 lb) (BMI 39 80)  Current Weight: 121 kg (266 lb 4 8 oz)   Total Weight Gain: 15 6 kg (34 lb 4 8 oz)   Pre-Vinny Vitals      Most Recent Value   Prenatal Assessment   Fetal Heart Rate  143/139   Movement  Present   Prenatal Vitals   Blood Pressure  122/70   Weight - Scale  121 kg (266 lb 4 8 oz)   Urine Albumin/Glucose   Dilation/Effacement/Station   Vaginal Drainage   Edema   LLE Edema  Trace   RLE Edema  Trace           General: Well appearing, no distress  Respiratory: Unlabored breathing  Cardiovascular: Regular rate  Abdomen: Soft, gravid, nontender  Fundal Height: Appropriate for gestational age  Extremities: Warm and well perfused  Non tender      Sebastian Galeazzi, MD  [facility] [address]  1/14/2021 10:07 AM

## 2021-01-14 NOTE — ASSESSMENT & PLAN NOTE
- APFS: Getting weekly NSTs and q4 week growth ultrasound through [facility]  - Delivery plan: rLTCS +BTL with Cancel at 38 weeks  - PNV in 2 weeks  - Patient Education: Fetal kick counts reviewed

## 2021-01-15 NOTE — PROGRESS NOTES
[address]: Ms Damon Carlos was seen for NST (found under the pregnancy episode) which I reviewed the RN assessment and agree, and BRANDI (see ultrasound report under OB procedures tab)  Two distinct tracings identified  Please don't hesitate to contact our office with any concerns or questions    Wili Suarez MD

## 2021-01-15 NOTE — PATIENT INSTRUCTIONS
Thank you for choosing us for your  care today  If you have any questions about your ultrasound or care, please do not hesitate to contact us or your primary obstetrician  Some general instructions for your pregnancy are:     Protect against coronavirus: Pregnant women are increased risk of severe COVID  Continue to practice social distancing, wear a mask, and wash your hands often  Because of the increased risk of pregnancy, you are advised to not attend or host inperson gatherings with people who do not currently live inside your household - this includes birthday parties, gender reveals, baby showers, etc)  Notify your primary care doctor if you have any symptoms including cough, shortness of breath or difficulty breathing, fever, chills, muscle pain, sore throat, or loss of taste or smell  Pregnant women can receive the coronavirus vaccine   Exercise: Aim for 22 minutes per day (150 minutes per week) of regular exercise  Walking is great!  Nutrition: aim for calcium-rich and iron-rich foods as well as healthy sources of protein   Protect against the flu: get yourself and your entire household vaccinated against influenza  This will protect your baby   Learn about Preeclampsia: preeclampsia is a common, serious high blood pressure complication in pregnancy  A blood pressure of 875QUOX (systolic or top number) or 43FBUR (diastolic or bottom number) is not normal and needs evaluation by your doctor   If you smoke, try to reduce how many cigarettes you smoke or quit completely  Do not vape   Other warning signs to watch out for in pregnancy or postpartum: chest pain, obstructed breathing or shortness of breath, seizures, thoughts of hurting yourself or your baby, bleeding, a painful or swollen leg, fever, or headache (see [employer] POST-BIRTH Warning Signs campaign)  If these happen call 911    Itching is also not normal in pregnancy and if you experience this, especially over your hands and feet, potentially worse at night, notify your doctors   Lastly, if you are contacted regarding participation in a survey about your experience in our office, please know that we take any feedback you provide seriously and use it to improve how we deliver care through our center

## 2021-01-18 ENCOUNTER — TELEPHONE (OUTPATIENT)
Dept: PERINATAL CARE | Facility: OTHER | Age: 31
End: 2021-01-18

## 2021-01-18 NOTE — TELEPHONE ENCOUNTER
Attempted to reach patient by phone and left voicemail to confirm appointment for MFM ultrasound  1 support person ( must be over the age of 15) may accompany you for your appointment  If you or your support person have traveled outside the state in the past 2 weeks, please call and notify our office today #[phone]  You and your support person must wear a mask ,covering nose and mouth,during your entire visit  To minimize your exposure in our waiting room, please call our office prior to entering the building  Check in and rooming questions will be done via phone  We will give you directions when to enter for your appointment  [place]: [phone]    IF you are not feeling well- cough, fever, shortness of breath or any flu like symptoms, contact your primary care physician or [address] [place] [address]  If you are awaiting COVID 19 test results please call and reschedule your appointment    Any questions with these instructions please call Maternal Fetal Medicine nurse line today @ # [phone]

## 2021-01-19 ENCOUNTER — ULTRASOUND (OUTPATIENT)
Dept: PERINATAL CARE | Facility: CLINIC | Age: 31
End: 2021-01-19
Payer: COMMERCIAL

## 2021-01-19 VITALS
BODY MASS INDEX: 45.99 KG/M2 | HEIGHT: 64 IN | DIASTOLIC BLOOD PRESSURE: 82 MMHG | WEIGHT: 269.4 LBS | SYSTOLIC BLOOD PRESSURE: 133 MMHG | HEART RATE: 91 BPM

## 2021-01-19 DIAGNOSIS — Z3A.33 33 WEEKS GESTATION OF PREGNANCY: ICD-10-CM

## 2021-01-19 DIAGNOSIS — O40.3XX2 POLYHYDRAMNIOS IN THIRD TRIMESTER COMPLICATION, FETUS 2 OF MULTIPLE GESTATION: ICD-10-CM

## 2021-01-19 DIAGNOSIS — O30.043 DICHORIONIC DIAMNIOTIC TWIN PREGNANCY IN THIRD TRIMESTER: Primary | ICD-10-CM

## 2021-01-19 PROBLEM — O40.3XX0 POLYHYDRAMNIOS IN THIRD TRIMESTER: Status: ACTIVE | Noted: 2021-01-19

## 2021-01-19 PROCEDURE — 76818 FETAL BIOPHYS PROFILE W/NST: CPT | Performed by: OBSTETRICS & GYNECOLOGY

## 2021-01-19 PROCEDURE — NC001 PR NO CHARGE: Performed by: OBSTETRICS & GYNECOLOGY

## 2021-01-19 RX ORDER — FAMOTIDINE 10 MG
10 TABLET ORAL DAILY
COMMUNITY
End: 2021-02-20 | Stop reason: HOSPADM

## 2021-01-19 NOTE — PROGRESS NOTES
[address] [facility] [address]: Ms Efraín Lechuga was seen today at 33w5d for twin NST (found under the pregnancy episode) which I reviewed the RN assessment and agree, and BRANDI (see ultrasound report under OB procedures tab)  Please don't hesitate to contact our office with any concerns or questions    Fiona Woo MD

## 2021-01-19 NOTE — LETTER
January 19, 2021     JASON Guzman  [address]  [address] [facility]     Patient: Lluvia Mary   YOB: 1990   Date of Visit: 1/19/2021       Dear Dr Osorio Garvey: Thank you for referring Roselia Turner to me for evaluation  Below are my notes for this consultation  If you have questions, please do not hesitate to call me  I look forward to following your patient along with you  Sincerely,        Akash Soto MD        CC: No Recipients  Akash Soto MD  1/19/2021 12:40 PM  Sign when Signing Visit  [address] [place] [address] Road: Ms Loki Corcoran was seen today at 33w5d for twin NST (found under the pregnancy episode) which I reviewed the RN assessment and agree, and BRANDI (see ultrasound report under OB procedures tab)  Please don't hesitate to contact our office with any concerns or questions    Akash Soto MD

## 2021-01-19 NOTE — PATIENT INSTRUCTIONS
Thank you for choosing us for your  care today  If you have any questions about your ultrasound or care, please do not hesitate to contact us or your primary obstetrician  Some general instructions for your pregnancy are:     Protect against coronavirus: Pregnant women are increased risk of severe COVID  Continue to practice social distancing, wear a mask, and wash your hands often  Because of the increased risk of pregnancy, you are advised to not attend or host inperson gatherings with people who do not currently live inside your household - this includes birthday parties, gender reveals, baby showers, etc)  Notify your primary care doctor if you have any symptoms including cough, shortness of breath or difficulty breathing, fever, chills, muscle pain, sore throat, or loss of taste or smell  Pregnant women can receive the coronavirus vaccine   Exercise: Aim for 22 minutes per day (150 minutes per week) of regular exercise  Walking is great!  Nutrition: aim for calcium-rich and iron-rich foods as well as healthy sources of protein   Protect against the flu: get yourself and your entire household vaccinated against influenza  This will protect your baby   Learn about Preeclampsia: preeclampsia is a common, serious high blood pressure complication in pregnancy  A blood pressure of 997DAQT (systolic or top number) or 97RMUD (diastolic or bottom number) is not normal and needs evaluation by your doctor   If you smoke, try to reduce how many cigarettes you smoke or quit completely  Do not vape   Other warning signs to watch out for in pregnancy or postpartum: chest pain, obstructed breathing or shortness of breath, seizures, thoughts of hurting yourself or your baby, bleeding, a painful or swollen leg, fever, or headache (see [facility] POST-BIRTH Warning Signs campaign)  If these happen call 911    Itching is also not normal in pregnancy and if you experience this, especially over your hands and feet, potentially worse at night, notify your doctors   Lastly, if you are contacted regarding participation in a survey about your experience in our office, please know that we take any feedback you provide seriously and use it to improve how we deliver care through our center

## 2021-01-20 ENCOUNTER — TELEMEDICINE (OUTPATIENT)
Dept: OBGYN CLINIC | Facility: CLINIC | Age: 31
End: 2021-01-20

## 2021-01-20 DIAGNOSIS — O30.043 DICHORIONIC DIAMNIOTIC TWIN PREGNANCY IN THIRD TRIMESTER: Primary | ICD-10-CM

## 2021-01-20 DIAGNOSIS — Z30.2 REQUEST FOR STERILIZATION: ICD-10-CM

## 2021-01-20 DIAGNOSIS — O99.213 MATERNAL OBESITY, ANTEPARTUM, THIRD TRIMESTER: ICD-10-CM

## 2021-01-20 PROCEDURE — PNV: Performed by: OBSTETRICS & GYNECOLOGY

## 2021-01-20 NOTE — PROGRESS NOTES
Virtual Regular Visit      Assessment/Plan:    Problem List Items Addressed This Visit        Other    Dichorionic diamniotic twin pregnancy in third trimester - Primary    Maternal obesity, antepartum, third trimester    Request for sterilization        Today we discussed procedure of permanent sterilization in terms of permanent nature of procedure and failure rate  Aware this is considered irreversible   Alternatives were discussed   Aware if too much scar tissue or bleeding in which case tubal not able to be performed recommend partner get vasectomy   All questions answered  Reviewed NST and BPP from yesterday at [address] and PTL precautions reviewed  Keep scheduled follow up           Reason for visit is   Chief Complaint   Patient presents with    Virtual Regular Visit    Virtual Regular Visit        Encounter provider Sylvie Rausch MD    Provider [facility] [place]  [address]  [facility] [address] [phone]      Recent Visits  No visits were found meeting these conditions  Showing recent visits within past 7 days and meeting all other requirements     Today's Visits  Date Type Provider Dept   01/20/21 Telemedicine Sylvie Rausch MD  Ob/Gyn Care [facility]   Showing today's visits and meeting all other requirements     Future Appointments  No visits were found meeting these conditions  Showing future appointments within next 150 days and meeting all other requirements        The patient was identified by name and date of birth  Khari Membreno was informed that this is a telemedicine visit and that the visit is being conducted through [employer] and patient was informed that this is a secure, HIPAA-compliant platform  She agrees to proceed     My office door was closed  No one else was in the room  She acknowledged consent and understanding of privacy and security of the video platform   The patient has agreed to participate and understands they can discontinue the visit at any time  Patient is aware this is a billable service  Subjective  Flavia Kothari is a 26 y o  female @ 33 weeks and 6 days with [employer] twins  States feeling well  Both babies moving  No LOF or vaginal bleeding   Scheduled for repeat section as well as BTL    HPI     Past Medical History:   Diagnosis Date    Ear infection     Shingles     Urinary tract infection     Varicella        Past Surgical History:   Procedure Laterality Date     SECTION      MD  DELIVERY ONLY N/A 2017    Procedure:  SECTION (); Surgeon: Tanya Vela MD;  Location: [facility];  Service: Obstetrics    WISDOM TOOTH EXTRACTION         Current Outpatient Medications   Medication Sig Dispense Refill    ascorbic acid (VITAMIN C) 250 MG CHEW Chew 250 mg daily      aspirin (ECOTRIN LOW STRENGTH) 81 mg EC tablet Take 1 tablet (81 mg total) by mouth daily For pre-eclampsia prevention in pregnancy  90 tablet 2    calcium carbonate (OS-SURENDRA) 600 MG tablet Take 1 tablet (600 mg total) by mouth 2 (two) times a day with meals Do not take with iron  60 tablet 6    famotidine (PEPCID) 10 mg tablet Take 10 mg by mouth daily      ferrous sulfate 324 (65 Fe) mg Take 1 tablet (324 mg total) by mouth daily at bedtime Take on empty stomach or with vit C  Avoid milk, calcium, reflux meds, thyroid meds  30 tablet 6    Prenatal Vit-Fe Fumarate-FA (PRENATAL VITAMIN PO) Take by mouth       No current facility-administered medications for this visit  No Known Allergies    Review of Systems   Constitutional: Negative  HENT: Negative  Eyes: Negative  Respiratory: Negative  Cardiovascular: Negative  Gastrointestinal: Negative  Endocrine: Negative  Genitourinary: Negative  Musculoskeletal: Negative  Skin: Negative  Allergic/Immunologic: Negative  Neurological: Negative  Hematological: Negative  Psychiatric/Behavioral: Negative  Video Exam    There were no vitals filed for this visit  Physical Exam  Constitutional:       Appearance: Normal appearance  HENT:      Nose: Nose normal    Eyes:      Conjunctiva/sclera: Conjunctivae normal    Pulmonary:      Effort: Pulmonary effort is normal    Neurological:      General: No focal deficit present  Mental Status: She is alert and oriented to person, place, and time  Mental status is at baseline  Psychiatric:         Mood and Affect: Mood normal          Thought Content: Thought content normal          Judgment: Judgment normal           I spent 10 minutes directly with the patient during this visit      [address] [place] [address] acknowledges that she has consented to an online visit or consultation  She understands that the online visit is based solely on information provided by her, and that, in the absence of a face-to-face physical evaluation by the physician, the diagnosis she receives is both limited and provisional in terms of accuracy and completeness  This is not intended to replace a full medical face-to-face evaluation by the physician  Jerel Bateman understands and accepts these terms

## 2021-01-25 ENCOUNTER — ROUTINE PRENATAL (OUTPATIENT)
Dept: OBGYN CLINIC | Facility: MEDICAL CENTER | Age: 31
End: 2021-01-25

## 2021-01-25 VITALS
HEIGHT: 64 IN | SYSTOLIC BLOOD PRESSURE: 128 MMHG | DIASTOLIC BLOOD PRESSURE: 70 MMHG | BODY MASS INDEX: 46.1 KG/M2 | WEIGHT: 270 LBS

## 2021-01-25 DIAGNOSIS — O30.042 DICHORIONIC DIAMNIOTIC TWIN PREGNANCY IN SECOND TRIMESTER: ICD-10-CM

## 2021-01-25 DIAGNOSIS — O99.213 MATERNAL OBESITY, ANTEPARTUM, THIRD TRIMESTER: Primary | ICD-10-CM

## 2021-01-25 PROCEDURE — PNV: Performed by: OBSTETRICS & GYNECOLOGY

## 2021-01-25 NOTE — PROGRESS NOTES
Wale Lundy is a 26y o  year old  at 34w3d for routine prenatal visit    + FM, no vaginal bleeding, contractions, or LOF  Complaints: No feels both babies move well   Most recent ultrasound and labs reviewed      NST next is 21 with MFM   Scheduled for repeat c section and BTl at 38 weeks  Discussed tubal ligation - she is sure that she wants it done     GBS next visit @ 36 weeks

## 2021-01-26 ENCOUNTER — ULTRASOUND (OUTPATIENT)
Dept: PERINATAL CARE | Facility: OTHER | Age: 31
End: 2021-01-26
Payer: COMMERCIAL

## 2021-01-26 VITALS
HEART RATE: 94 BPM | DIASTOLIC BLOOD PRESSURE: 76 MMHG | SYSTOLIC BLOOD PRESSURE: 117 MMHG | WEIGHT: 271 LBS | HEIGHT: 64 IN | BODY MASS INDEX: 46.26 KG/M2

## 2021-01-26 DIAGNOSIS — O99.213 MATERNAL OBESITY, ANTEPARTUM, THIRD TRIMESTER: ICD-10-CM

## 2021-01-26 DIAGNOSIS — O30.043 DICHORIONIC DIAMNIOTIC TWIN PREGNANCY IN THIRD TRIMESTER: Primary | ICD-10-CM

## 2021-01-26 PROCEDURE — 76819 FETAL BIOPHYS PROFIL W/O NST: CPT | Performed by: OBSTETRICS & GYNECOLOGY

## 2021-01-26 NOTE — PROGRESS NOTES
Repeat Non-Stress Testing:    Pt verbalizes +FM  Pt denies ALL:               Leaking of fluid   Contractions   Vaginal bleeding   Decreased fetal movement    Patient has no questions or concerns  Difficulties monitoring both babies  Continue to adjust monitors and hold monitors during entire NST  DR Mario Chairez aware and requests add on BPP for both babies

## 2021-01-26 NOTE — PATIENT INSTRUCTIONS
Kick Counts in Pregnancy   AMBULATORY CARE:   Kick counts  measure how much your baby is moving in your womb  A kick from your baby can be felt as a twist, turn, swish, roll, or jab  It is common to feel your baby kicking at 26 to 28 weeks of pregnancy  You may feel your baby kick as early as 20 weeks of pregnancy  You may want to start counting at 28 weeks  Contact your healthcare provider immediately if:   · You feel a change in the number of kicks or movements of your baby  · You feel fewer than 10 kicks within 2 hours  · You have questions or concerns about your baby's movements  Why measure kick counts:  Your baby's movement may provide information about your baby's health  He or she may move less, or not at all, if there are problems  Your baby may move less if he or she is not getting enough oxygen or nutrition from the placenta  Do not smoke while you are pregnant  Smoking decreases the amount of oxygen that gets to your baby  Talk to your healthcare provider if you need help to quit smoking  Tell your healthcare provider as soon as you feel a change in your baby's movements  When to measure kick counts:   · Measure kick counts at the same time every day  · Measure kick counts when your baby is awake and most active  Your baby may be most active in the evening  How to measure kick counts:  Check that your baby is awake before you measure kick counts  You can wake up your baby by lightly pushing on your belly, walking, or drinking something cold  Your healthcare provider may tell you different ways to measure kick counts  You may be told to do the following:  · Use a chart or clock to keep track of the time you start and finish counting  · Sit in a chair or lie on your left side  · Place your hands on the largest part of your belly  · Count until you reach 10 kicks  Write down how much time it takes to count 10 kicks  · It may take 30 minutes to 2 hours to count 10 kicks  It should not take more than 2 hours to count 10 kicks  Follow up with your healthcare provider as directed:  Write down your questions so you remember to ask them during your visits  © Copyright [address] Information is for End User's use only and may not be sold, redistributed or otherwise used for commercial purposes  All illustrations and images included in CareNotes® are the copyrighted property of A D A M , Inc  or [facility] [employer]   The above information is an  only  It is not intended as medical advice for individual conditions or treatments  Talk to your doctor, nurse or pharmacist before following any medical regimen to see if it is safe and effective for you

## 2021-02-04 ENCOUNTER — TELEPHONE (OUTPATIENT)
Dept: PERINATAL CARE | Facility: OTHER | Age: 31
End: 2021-02-04

## 2021-02-04 NOTE — TELEPHONE ENCOUNTER
-------------------------------------------------------------    Attempted to reach patient by phone and left voicemail to confirm appointment for MFM ultrasound  1 support person ( must be over the age of 15) may accompany you for your appointment  If you or your support person have traveled outside the state in the past 2 weeks, please call and notify our office today #[phone]  You and your support person must wear a mask ,covering nose and mouth,during your entire visit  To minimize your exposure in our waiting room, please call our office prior to entering the building  Check in and rooming questions will be done via phone  We will give you directions when to enter for your appointment  Jaime: [phone]    IF you are not feeling well- cough, fever, shortness of breath or any flu like symptoms, contact your primary care physician or [address] [place] [address]  If you are awaiting COVID 19 test results please call and reschedule your appointment    Any questions with these instructions please call Maternal Fetal Medicine nurse line today @ # [phone]

## 2021-02-05 ENCOUNTER — ROUTINE PRENATAL (OUTPATIENT)
Dept: PERINATAL CARE | Facility: CLINIC | Age: 31
End: 2021-02-05
Payer: COMMERCIAL

## 2021-02-05 ENCOUNTER — ULTRASOUND (OUTPATIENT)
Dept: PERINATAL CARE | Facility: CLINIC | Age: 31
End: 2021-02-05
Payer: COMMERCIAL

## 2021-02-05 VITALS
BODY MASS INDEX: 46.52 KG/M2 | HEIGHT: 64 IN | SYSTOLIC BLOOD PRESSURE: 116 MMHG | HEART RATE: 101 BPM | DIASTOLIC BLOOD PRESSURE: 62 MMHG

## 2021-02-05 VITALS — BODY MASS INDEX: 47.22 KG/M2 | WEIGHT: 276.6 LBS | HEIGHT: 64 IN

## 2021-02-05 DIAGNOSIS — O99.213 MATERNAL OBESITY, ANTEPARTUM, THIRD TRIMESTER: ICD-10-CM

## 2021-02-05 DIAGNOSIS — Z3A.36 36 WEEKS GESTATION OF PREGNANCY: ICD-10-CM

## 2021-02-05 DIAGNOSIS — O30.043 DICHORIONIC DIAMNIOTIC TWIN PREGNANCY IN THIRD TRIMESTER: Primary | ICD-10-CM

## 2021-02-05 PROBLEM — O40.3XX0 POLYHYDRAMNIOS IN THIRD TRIMESTER: Status: RESOLVED | Noted: 2021-01-19 | Resolved: 2021-02-05

## 2021-02-05 PROCEDURE — 59025 FETAL NON-STRESS TEST: CPT | Performed by: OBSTETRICS & GYNECOLOGY

## 2021-02-05 PROCEDURE — 76816 OB US FOLLOW-UP PER FETUS: CPT | Performed by: OBSTETRICS & GYNECOLOGY

## 2021-02-05 PROCEDURE — NC001 PR NO CHARGE: Performed by: OBSTETRICS & GYNECOLOGY

## 2021-02-05 NOTE — LETTER
2021     Elsy Shearer, [address]  [facility] [address]    Patient: Amairani Dumas   YOB: 1990   Date of Visit: 2021       Dear Dr Melinda Dupree: Thank you for referring Lady Torres to me for evaluation  Below are my notes for this consultation  If you have questions, please do not hesitate to call me  I look forward to following your patient along with you  Sincerely,        Justin Valdez MD        CC: No Recipients  Justin Valdez MD  2021  2:46 PM  Sign when Signing Visit  Amairani Dumas has no complaints today  She reports regular fetal movements and does not report any problems related to hypertension, gestational diabetes,  labor, or vaginal bleeding  Her prenatal course has apparently been unremarkable in the interim since her most recent visit here  Problem list:  1  Luly twin pregnancy  2  History of polyhydramnios on baby B   3  Maternal obesity  4  History of a  is planning on a repeat  and tubal     Ultrasound findings: The ultrasound today shows normal interval fetal growth and fluid on both babies were both in the transverse position  The placentas are not near her prior  scar  The polyhydramnios on baby B has resolved  Her NST is reactive for both babies  Pregnancy ultrasound has limitations and is unable to detect all forms of fetal congenital abnormalities  The inaccuracy in the EFW can be off by 1 lb either way in the third trimester  Follow up recommended:    Recommend weekly nonstress tests /fluid scans till delivery       Justin Valdez MD

## 2021-02-05 NOTE — PROGRESS NOTES
Chiquita Montes De Oca has no complaints today  She reports regular fetal movements and does not report any problems related to hypertension, gestational diabetes,  labor, or vaginal bleeding  Her prenatal course has apparently been unremarkable in the interim since her most recent visit here  Problem list:  1  Luly twin pregnancy  2  History of polyhydramnios on baby B   3  Maternal obesity  4  History of a  is planning on a repeat  and tubal     Ultrasound findings: The ultrasound today shows normal interval fetal growth and fluid on both babies were both in the transverse position  The placentas are not near her prior  scar  The polyhydramnios on baby B has resolved  Her NST is reactive for both babies  Pregnancy ultrasound has limitations and is unable to detect all forms of fetal congenital abnormalities  The inaccuracy in the EFW can be off by 1 lb either way in the third trimester  Follow up recommended:    Recommend weekly nonstress tests /fluid scans till delivery       Jeanine Su MD

## 2021-02-05 NOTE — PATIENT INSTRUCTIONS
Kick Counts in Pregnancy   AMBULATORY CARE:   Kick counts  measure how much your baby is moving in your womb  A kick from your baby can be felt as a twist, turn, swish, roll, or jab  It is common to feel your baby kicking at 26 to 28 weeks of pregnancy  You may feel your baby kick as early as 20 weeks of pregnancy  You may want to start counting at 28 weeks  Contact your healthcare provider immediately if:   · You feel a change in the number of kicks or movements of your baby  · You feel fewer than 10 kicks within 2 hours  · You have questions or concerns about your baby's movements  Why measure kick counts:  Your baby's movement may provide information about your baby's health  He or she may move less, or not at all, if there are problems  Your baby may move less if he or she is not getting enough oxygen or nutrition from the placenta  Do not smoke while you are pregnant  Smoking decreases the amount of oxygen that gets to your baby  Talk to your healthcare provider if you need help to quit smoking  Tell your healthcare provider as soon as you feel a change in your baby's movements  When to measure kick counts:   · Measure kick counts at the same time every day  · Measure kick counts when your baby is awake and most active  Your baby may be most active in the evening  How to measure kick counts:  Check that your baby is awake before you measure kick counts  You can wake up your baby by lightly pushing on your belly, walking, or drinking something cold  Your healthcare provider may tell you different ways to measure kick counts  You may be told to do the following:  · Use a chart or clock to keep track of the time you start and finish counting  · Sit in a chair or lie on your left side  · Place your hands on the largest part of your belly  · Count until you reach 10 kicks  Write down how much time it takes to count 10 kicks  · It may take 30 minutes to 2 hours to count 10 kicks  It should not take more than 2 hours to count 10 kicks  Follow up with your healthcare provider as directed:  Write down your questions so you remember to ask them during your visits  © Copyright [address] Information is for End User's use only and may not be sold, redistributed or otherwise used for commercial purposes  All illustrations and images included in CareNotes® are the copyrighted property of A D A M , Inc  or [facility] [employer]   The above information is an  only  It is not intended as medical advice for individual conditions or treatments  Talk to your doctor, nurse or pharmacist before following any medical regimen to see if it is safe and effective for you

## 2021-02-08 ENCOUNTER — ROUTINE PRENATAL (OUTPATIENT)
Dept: OBGYN CLINIC | Facility: MEDICAL CENTER | Age: 31
End: 2021-02-08

## 2021-02-08 VITALS — BODY MASS INDEX: 47.2 KG/M2 | DIASTOLIC BLOOD PRESSURE: 84 MMHG | SYSTOLIC BLOOD PRESSURE: 130 MMHG | WEIGHT: 275 LBS

## 2021-02-08 DIAGNOSIS — Z3A.36 36 WEEKS GESTATION OF PREGNANCY: Primary | ICD-10-CM

## 2021-02-08 DIAGNOSIS — Z98.891 HISTORY OF CESAREAN DELIVERY: ICD-10-CM

## 2021-02-08 DIAGNOSIS — Z34.93 THIRD TRIMESTER PREGNANCY: ICD-10-CM

## 2021-02-08 DIAGNOSIS — O30.043 DICHORIONIC DIAMNIOTIC TWIN PREGNANCY IN THIRD TRIMESTER: ICD-10-CM

## 2021-02-08 DIAGNOSIS — Z30.2 REQUEST FOR STERILIZATION: ICD-10-CM

## 2021-02-08 PROCEDURE — 87653 STREP B DNA AMP PROBE: CPT | Performed by: OBSTETRICS & GYNECOLOGY

## 2021-02-08 PROCEDURE — PNV: Performed by: OBSTETRICS & GYNECOLOGY

## 2021-02-09 ENCOUNTER — TELEPHONE (OUTPATIENT)
Dept: PERINATAL CARE | Facility: OTHER | Age: 31
End: 2021-02-09

## 2021-02-09 NOTE — TELEPHONE ENCOUNTER
Attempted to reach patient by phone and left voicemail to confirm appointment for MFM ultrasound  1 support person ( must be over the age of 15) may accompany you for your appointment  Please wear masks ( [place] [facility])  You and your support person will be screened upon arrival   IF not feeling well- cough, fever, shortness of breath or any flu like symptoms contact your primary care physician or [phone]  ? Please call our office prior to entering the building  Check in and rooming questions will be done via phone  Inside office # provided:  ?    [employer] [facility] line: [phone] [facility] does not allow cell phone use, recording device or streaming during ultrasound     Any questions with these instructions please call Maternal Fetal Medicine nurse line today @ # [phone]

## 2021-02-09 NOTE — PROGRESS NOTES
Assessment  26 y o   at 36w4d presenting for routine prenatal visit  Plan  Diagnoses and all orders for this visit:    36 weeks gestation of pregnancy  Third trimester pregnancy  -      labor precautions  - FKC  - Strep B DNA probe, amplification  - Return in 1wk for PN    Dichorionic diamniotic twin pregnancy in third trimester  History of  delivery  Request for sterilization  - Follows with MFM; antepartum surveillance ongoing  - RLTCS + BTL scheduled  - Discussed NPO, surgical scrub      ____________________________________________________________        Subjective    Lluvia Mary is a 26 y o  Melania Miser at 36w4d who presents for routine prenatal visit  She is uncomfortable but without complaint  She denies regular contractions, loss of fluid, or vaginal bleeding  She feels regular fetal movements  Pregnancy Problems:  Patient Active Problem List   Diagnosis    BMI 39 0-39 9,adult    History of  delivery   Mendieta Dichorionic diamniotic twin pregnancy in third trimester    Maternal obesity, antepartum, third trimester    Request for sterilization    36 weeks gestation of pregnancy         Objective  /84   Wt 125 kg (275 lb)   LMP 2020   BMI 47 20 kg/m²     FHT: 140 BPM (A) / 132 BPM (B)   Uterine Size: consistent with twins     Physical Exam:  Physical Exam  Constitutional:       General: She is not in acute distress  Appearance: Normal appearance  She is well-developed  She is not ill-appearing, toxic-appearing or diaphoretic  HENT:      Head: Normocephalic and atraumatic  Eyes:      General: No scleral icterus  Right eye: No discharge  Left eye: No discharge  Pulmonary:      Effort: Pulmonary effort is normal  No accessory muscle usage or respiratory distress  Abdominal:      General: There is distension (gravid)  Tenderness: There is no abdominal tenderness  There is no guarding or rebound     Skin:     General: Skin is warm and dry  Coloration: Skin is not jaundiced  Findings: No bruising, erythema or rash  Neurological:      Mental Status: She is alert  Psychiatric:         Mood and Affect: Mood normal          Behavior: Behavior normal          Thought Content:  Thought content normal          Judgment: Judgment normal

## 2021-02-10 ENCOUNTER — ULTRASOUND (OUTPATIENT)
Dept: PERINATAL CARE | Facility: OTHER | Age: 31
End: 2021-02-10
Payer: COMMERCIAL

## 2021-02-10 VITALS
BODY MASS INDEX: 47.2 KG/M2 | HEART RATE: 95 BPM | HEIGHT: 64 IN | DIASTOLIC BLOOD PRESSURE: 82 MMHG | SYSTOLIC BLOOD PRESSURE: 123 MMHG

## 2021-02-10 DIAGNOSIS — Z3A.36 36 WEEKS GESTATION OF PREGNANCY: ICD-10-CM

## 2021-02-10 DIAGNOSIS — O30.043 DICHORIONIC DIAMNIOTIC TWIN PREGNANCY IN THIRD TRIMESTER: Primary | ICD-10-CM

## 2021-02-10 LAB — GP B STREP DNA SPEC QL NAA+PROBE: ABNORMAL

## 2021-02-10 PROCEDURE — 76815 OB US LIMITED FETUS(S): CPT | Performed by: OBSTETRICS & GYNECOLOGY

## 2021-02-10 PROCEDURE — 59025 FETAL NON-STRESS TEST: CPT | Performed by: OBSTETRICS & GYNECOLOGY

## 2021-02-10 NOTE — PATIENT INSTRUCTIONS
Kick Counts in Pregnancy   AMBULATORY CARE:   Kick counts  measure how much your baby is moving in your womb  A kick from your baby can be felt as a twist, turn, swish, roll, or jab  It is common to feel your baby kicking at 26 to 28 weeks of pregnancy  You may feel your baby kick as early as 20 weeks of pregnancy  You may want to start counting at 28 weeks  Contact your healthcare provider immediately if:   · You feel a change in the number of kicks or movements of your baby  · You feel fewer than 10 kicks within 2 hours  · You have questions or concerns about your baby's movements  Why measure kick counts:  Your baby's movement may provide information about your baby's health  He or she may move less, or not at all, if there are problems  Your baby may move less if he or she is not getting enough oxygen or nutrition from the placenta  Do not smoke while you are pregnant  Smoking decreases the amount of oxygen that gets to your baby  Talk to your healthcare provider if you need help to quit smoking  Tell your healthcare provider as soon as you feel a change in your baby's movements  When to measure kick counts:   · Measure kick counts at the same time every day  · Measure kick counts when your baby is awake and most active  Your baby may be most active in the evening  How to measure kick counts:  Check that your baby is awake before you measure kick counts  You can wake up your baby by lightly pushing on your belly, walking, or drinking something cold  Your healthcare provider may tell you different ways to measure kick counts  You may be told to do the following:  · Use a chart or clock to keep track of the time you start and finish counting  · Sit in a chair or lie on your left side  · Place your hands on the largest part of your belly  · Count until you reach 10 kicks  Write down how much time it takes to count 10 kicks  · It may take 30 minutes to 2 hours to count 10 kicks  It should not take more than 2 hours to count 10 kicks  Follow up with your healthcare provider as directed:  Write down your questions so you remember to ask them during your visits  © Copyright [address] Information is for End User's use only and may not be sold, redistributed or otherwise used for commercial purposes  All illustrations and images included in CareNotes® are the copyrighted property of A D A M , Inc  or [facility] 2[facility] [employer]   The above information is an  only  It is not intended as medical advice for individual conditions or treatments  Talk to your doctor, nurse or pharmacist before following any medical regimen to see if it is safe and effective for you

## 2021-02-10 NOTE — PROGRESS NOTES
Repeat Non-Stress Testing:    Pt verbalizes +FM  Pt denies ALL:               Leaking of fluid   Contractions   Vaginal bleeding   Decreased fetal movement    Patient has no questions or concerns  Pt for delivery 2/18/21

## 2021-02-15 ENCOUNTER — ROUTINE PRENATAL (OUTPATIENT)
Dept: OBGYN CLINIC | Facility: MEDICAL CENTER | Age: 31
End: 2021-02-15

## 2021-02-15 VITALS — DIASTOLIC BLOOD PRESSURE: 80 MMHG | BODY MASS INDEX: 48.23 KG/M2 | WEIGHT: 281 LBS | SYSTOLIC BLOOD PRESSURE: 130 MMHG

## 2021-02-15 DIAGNOSIS — Z34.93 THIRD TRIMESTER PREGNANCY: Primary | ICD-10-CM

## 2021-02-15 PROCEDURE — PNV: Performed by: OBSTETRICS & GYNECOLOGY

## 2021-02-16 ENCOUNTER — ANESTHESIA EVENT (INPATIENT)
Dept: LABOR AND DELIVERY | Facility: HOSPITAL | Age: 31
End: 2021-02-16
Payer: COMMERCIAL

## 2021-02-18 ENCOUNTER — HOSPITAL ENCOUNTER (INPATIENT)
Facility: HOSPITAL | Age: 31
LOS: 2 days | Discharge: HOME | End: 2021-02-20
Attending: OBSTETRICS & GYNECOLOGY | Admitting: OBSTETRICS & GYNECOLOGY
Payer: COMMERCIAL

## 2021-02-18 ENCOUNTER — ANESTHESIA (INPATIENT)
Dept: LABOR AND DELIVERY | Facility: HOSPITAL | Age: 31
End: 2021-02-18
Payer: COMMERCIAL

## 2021-02-18 VITALS — HEART RATE: 88 BPM

## 2021-02-18 DIAGNOSIS — Z3A.38 38 WEEKS GESTATION OF PREGNANCY: ICD-10-CM

## 2021-02-18 DIAGNOSIS — Z98.891 S/P CESAREAN SECTION: Primary | ICD-10-CM

## 2021-02-18 LAB
ABO GROUP BLD: NORMAL
BASE EXCESS BLDCOA CALC-SCNC: -2.5 MMOL/L (ref 3–11)
BASE EXCESS BLDCOA CALC-SCNC: -2.8 MMOL/L (ref 3–11)
BASE EXCESS BLDCOV CALC-SCNC: -1.2 MMOL/L (ref 1–9)
BASE EXCESS BLDCOV CALC-SCNC: -2.4 MMOL/L (ref 1–9)
BLD GP AB SCN SERPL QL: NEGATIVE
ERYTHROCYTE [DISTWIDTH] IN BLOOD BY AUTOMATED COUNT: 13.7 % (ref 11.6–15.1)
HCO3 BLDCOA-SCNC: 24.3 MMOL/L (ref 17.3–27.3)
HCO3 BLDCOA-SCNC: 25 MMOL/L (ref 17.3–27.3)
HCO3 BLDCOV-SCNC: 23.3 MMOL/L (ref 12.2–28.6)
HCO3 BLDCOV-SCNC: 25.9 MMOL/L (ref 12.2–28.6)
HCT VFR BLD AUTO: 35.8 % (ref 34.8–46.1)
HGB BLD-MCNC: 11.5 G/DL (ref 11.5–15.4)
MCH RBC QN AUTO: 29.8 PG (ref 26.8–34.3)
MCHC RBC AUTO-ENTMCNC: 32.1 G/DL (ref 31.4–37.4)
MCV RBC AUTO: 93 FL (ref 82–98)
O2 CT VFR BLDCOA CALC: 6.2 ML/DL
O2 CT VFR BLDCOA CALC: 7.3 ML/DL
OXYHGB MFR BLDCOA: 29.8 %
OXYHGB MFR BLDCOA: 34.5 %
OXYHGB MFR BLDCOV: 42.4 %
OXYHGB MFR BLDCOV: 79.2 %
PCO2 BLDCOA: 50.9 MM[HG] (ref 30–60)
PCO2 BLDCOA: 53.6 MM[HG] (ref 30–60)
PCO2 BLDCOV: 43.5 MM HG (ref 27–43)
PCO2 BLDCOV: 52.3 MM HG (ref 27–43)
PH BLDCOA: 7.29 [PH] (ref 7.23–7.43)
PH BLDCOA: 7.3 [PH] (ref 7.23–7.43)
PH BLDCOV: 7.31 [PH] (ref 7.19–7.49)
PH BLDCOV: 7.35 [PH] (ref 7.19–7.49)
PLATELET # BLD AUTO: 150 THOUSANDS/UL (ref 149–390)
PMV BLD AUTO: 10.8 FL (ref 8.9–12.7)
PO2 BLDCOA: 15.9 MM HG (ref 5–25)
PO2 BLDCOA: 17.2 MM HG (ref 5–25)
PO2 BLDCOV: 19.2 MM HG (ref 15–45)
PO2 BLDCOV: 34.9 MM HG (ref 15–45)
RBC # BLD AUTO: 3.86 MILLION/UL (ref 3.81–5.12)
RH BLD: POSITIVE
SAO2 % BLDCOV: 17.1 ML/DL
SAO2 % BLDCOV: 9.3 ML/DL
SPECIMEN EXPIRATION DATE: NORMAL
WBC # BLD AUTO: 7.49 THOUSAND/UL (ref 4.31–10.16)

## 2021-02-18 PROCEDURE — 88302 TISSUE EXAM BY PATHOLOGIST: CPT | Performed by: PATHOLOGY

## 2021-02-18 PROCEDURE — 82805 BLOOD GASES W/O2 SATURATION: CPT | Performed by: OBSTETRICS & GYNECOLOGY

## 2021-02-18 PROCEDURE — 4A1HXCZ MONITORING OF PRODUCTS OF CONCEPTION, CARDIAC RATE, EXTERNAL APPROACH: ICD-10-PCS | Performed by: OBSTETRICS & GYNECOLOGY

## 2021-02-18 PROCEDURE — 85027 COMPLETE CBC AUTOMATED: CPT | Performed by: OBSTETRICS & GYNECOLOGY

## 2021-02-18 PROCEDURE — 86901 BLOOD TYPING SEROLOGIC RH(D): CPT | Performed by: OBSTETRICS & GYNECOLOGY

## 2021-02-18 PROCEDURE — 88307 TISSUE EXAM BY PATHOLOGIST: CPT | Performed by: PATHOLOGY

## 2021-02-18 PROCEDURE — 86850 RBC ANTIBODY SCREEN: CPT | Performed by: OBSTETRICS & GYNECOLOGY

## 2021-02-18 PROCEDURE — 86900 BLOOD TYPING SEROLOGIC ABO: CPT | Performed by: OBSTETRICS & GYNECOLOGY

## 2021-02-18 PROCEDURE — 58611 LIGATE OVIDUCT(S) ADD-ON: CPT | Performed by: OBSTETRICS & GYNECOLOGY

## 2021-02-18 PROCEDURE — 0UT70ZZ RESECTION OF BILATERAL FALLOPIAN TUBES, OPEN APPROACH: ICD-10-PCS | Performed by: OBSTETRICS & GYNECOLOGY

## 2021-02-18 PROCEDURE — 86592 SYPHILIS TEST NON-TREP QUAL: CPT | Performed by: OBSTETRICS & GYNECOLOGY

## 2021-02-18 PROCEDURE — 59510 CESAREAN DELIVERY: CPT | Performed by: OBSTETRICS & GYNECOLOGY

## 2021-02-18 RX ORDER — ONDANSETRON 2 MG/ML
4 INJECTION INTRAMUSCULAR; INTRAVENOUS EVERY 4 HOURS PRN
Status: DISCONTINUED | OUTPATIENT
Start: 2021-02-18 | End: 2021-02-18 | Stop reason: SDUPTHER

## 2021-02-18 RX ORDER — SODIUM CHLORIDE, SODIUM LACTATE, POTASSIUM CHLORIDE, CALCIUM CHLORIDE 600; 310; 30; 20 MG/100ML; MG/100ML; MG/100ML; MG/100ML
125 INJECTION, SOLUTION INTRAVENOUS CONTINUOUS
Status: DISCONTINUED | OUTPATIENT
Start: 2021-02-18 | End: 2021-02-20 | Stop reason: HOSPADM

## 2021-02-18 RX ORDER — DEXAMETHASONE SODIUM PHOSPHATE 4 MG/ML
8 INJECTION, SOLUTION INTRA-ARTICULAR; INTRALESIONAL; INTRAMUSCULAR; INTRAVENOUS; SOFT TISSUE ONCE AS NEEDED
Status: ACTIVE | OUTPATIENT
Start: 2021-02-18 | End: 2021-02-19

## 2021-02-18 RX ORDER — FENTANYL CITRATE 50 UG/ML
INJECTION, SOLUTION INTRAMUSCULAR; INTRAVENOUS
Status: COMPLETED
Start: 2021-02-18 | End: 2021-02-18

## 2021-02-18 RX ORDER — DIPHENHYDRAMINE HCL 25 MG
25 TABLET ORAL EVERY 6 HOURS PRN
Status: DISCONTINUED | OUTPATIENT
Start: 2021-02-18 | End: 2021-02-20 | Stop reason: HOSPADM

## 2021-02-18 RX ORDER — MORPHINE SULFATE 0.5 MG/ML
INJECTION, SOLUTION EPIDURAL; INTRATHECAL; INTRAVENOUS
Status: DISPENSED
Start: 2021-02-18 | End: 2021-02-18

## 2021-02-18 RX ORDER — NALOXONE HYDROCHLORIDE 0.4 MG/ML
0.1 INJECTION, SOLUTION INTRAMUSCULAR; INTRAVENOUS; SUBCUTANEOUS
Status: ACTIVE | OUTPATIENT
Start: 2021-02-18 | End: 2021-02-19

## 2021-02-18 RX ORDER — MIDAZOLAM HYDROCHLORIDE 2 MG/2ML
INJECTION, SOLUTION INTRAMUSCULAR; INTRAVENOUS AS NEEDED
Status: DISCONTINUED | OUTPATIENT
Start: 2021-02-18 | End: 2021-02-18

## 2021-02-18 RX ORDER — FENTANYL CITRATE 50 UG/ML
INJECTION, SOLUTION INTRAMUSCULAR; INTRAVENOUS AS NEEDED
Status: DISCONTINUED | OUTPATIENT
Start: 2021-02-18 | End: 2021-02-18

## 2021-02-18 RX ORDER — ONDANSETRON 2 MG/ML
4 INJECTION INTRAMUSCULAR; INTRAVENOUS EVERY 8 HOURS PRN
Status: DISCONTINUED | OUTPATIENT
Start: 2021-02-18 | End: 2021-02-18

## 2021-02-18 RX ORDER — OXYTOCIN/RINGER'S LACTATE 30/500 ML
62.5 PLASTIC BAG, INJECTION (ML) INTRAVENOUS ONCE
Status: DISCONTINUED | OUTPATIENT
Start: 2021-02-18 | End: 2021-02-20 | Stop reason: HOSPADM

## 2021-02-18 RX ORDER — ACETAMINOPHEN 325 MG/1
650 TABLET ORAL EVERY 4 HOURS PRN
Status: DISCONTINUED | OUTPATIENT
Start: 2021-02-18 | End: 2021-02-20 | Stop reason: HOSPADM

## 2021-02-18 RX ORDER — IBUPROFEN 600 MG/1
600 TABLET ORAL EVERY 6 HOURS PRN
Status: DISCONTINUED | OUTPATIENT
Start: 2021-02-18 | End: 2021-02-20 | Stop reason: HOSPADM

## 2021-02-18 RX ORDER — OXYTOCIN/RINGER'S LACTATE 30/500 ML
PLASTIC BAG, INJECTION (ML) INTRAVENOUS CONTINUOUS PRN
Status: DISCONTINUED | OUTPATIENT
Start: 2021-02-18 | End: 2021-02-18

## 2021-02-18 RX ORDER — DIPHENHYDRAMINE HYDROCHLORIDE 50 MG/ML
25 INJECTION INTRAMUSCULAR; INTRAVENOUS EVERY 6 HOURS PRN
Status: ACTIVE | OUTPATIENT
Start: 2021-02-18 | End: 2021-02-19

## 2021-02-18 RX ORDER — KETOROLAC TROMETHAMINE 30 MG/ML
30 INJECTION, SOLUTION INTRAMUSCULAR; INTRAVENOUS EVERY 6 HOURS PRN
Status: DISPENSED | OUTPATIENT
Start: 2021-02-18 | End: 2021-02-19

## 2021-02-18 RX ORDER — OXYCODONE HYDROCHLORIDE AND ACETAMINOPHEN 5; 325 MG/1; MG/1
1 TABLET ORAL EVERY 6 HOURS PRN
Status: DISCONTINUED | OUTPATIENT
Start: 2021-02-18 | End: 2021-02-18 | Stop reason: SDUPTHER

## 2021-02-18 RX ORDER — OXYCODONE HYDROCHLORIDE AND ACETAMINOPHEN 5; 325 MG/1; MG/1
2 TABLET ORAL EVERY 4 HOURS PRN
Status: DISCONTINUED | OUTPATIENT
Start: 2021-02-18 | End: 2021-02-20 | Stop reason: HOSPADM

## 2021-02-18 RX ORDER — BUPIVACAINE HYDROCHLORIDE 7.5 MG/ML
INJECTION, SOLUTION INTRASPINAL AS NEEDED
Status: DISCONTINUED | OUTPATIENT
Start: 2021-02-18 | End: 2021-02-18

## 2021-02-18 RX ORDER — ONDANSETRON 2 MG/ML
4 INJECTION INTRAMUSCULAR; INTRAVENOUS EVERY 8 HOURS PRN
Status: DISCONTINUED | OUTPATIENT
Start: 2021-02-18 | End: 2021-02-20 | Stop reason: HOSPADM

## 2021-02-18 RX ORDER — OXYCODONE HYDROCHLORIDE AND ACETAMINOPHEN 5; 325 MG/1; MG/1
1 TABLET ORAL EVERY 4 HOURS PRN
Status: DISCONTINUED | OUTPATIENT
Start: 2021-02-18 | End: 2021-02-20 | Stop reason: HOSPADM

## 2021-02-18 RX ORDER — CALCIUM CARBONATE 200(500)MG
1000 TABLET,CHEWABLE ORAL DAILY PRN
Status: DISCONTINUED | OUTPATIENT
Start: 2021-02-18 | End: 2021-02-20 | Stop reason: HOSPADM

## 2021-02-18 RX ORDER — SODIUM CHLORIDE, SODIUM LACTATE, POTASSIUM CHLORIDE, CALCIUM CHLORIDE 600; 310; 30; 20 MG/100ML; MG/100ML; MG/100ML; MG/100ML
125 INJECTION, SOLUTION INTRAVENOUS CONTINUOUS
Status: DISCONTINUED | OUTPATIENT
Start: 2021-02-18 | End: 2021-02-18 | Stop reason: SDUPTHER

## 2021-02-18 RX ORDER — KETOROLAC TROMETHAMINE 30 MG/ML
INJECTION, SOLUTION INTRAMUSCULAR; INTRAVENOUS AS NEEDED
Status: DISCONTINUED | OUTPATIENT
Start: 2021-02-18 | End: 2021-02-18

## 2021-02-18 RX ORDER — DOCUSATE SODIUM 100 MG/1
100 CAPSULE, LIQUID FILLED ORAL 2 TIMES DAILY
Status: DISCONTINUED | OUTPATIENT
Start: 2021-02-18 | End: 2021-02-20 | Stop reason: HOSPADM

## 2021-02-18 RX ORDER — MIDAZOLAM HYDROCHLORIDE 2 MG/2ML
INJECTION, SOLUTION INTRAMUSCULAR; INTRAVENOUS
Status: COMPLETED
Start: 2021-02-18 | End: 2021-02-18

## 2021-02-18 RX ADMIN — PHENYLEPHRINE HYDROCHLORIDE 200 MCG: 10 INJECTION INTRAVENOUS at 07:57

## 2021-02-18 RX ADMIN — MIDAZOLAM 1 MG: 1 INJECTION INTRAMUSCULAR; INTRAVENOUS at 09:09

## 2021-02-18 RX ADMIN — KETOROLAC TROMETHAMINE 30 MG: 30 INJECTION, SOLUTION INTRAMUSCULAR at 15:58

## 2021-02-18 RX ADMIN — SODIUM CHLORIDE, SODIUM LACTATE, POTASSIUM CHLORIDE, AND CALCIUM CHLORIDE: .6; .31; .03; .02 INJECTION, SOLUTION INTRAVENOUS at 08:52

## 2021-02-18 RX ADMIN — FENTANYL CITRATE 50 MCG: 50 INJECTION, SOLUTION INTRAMUSCULAR; INTRAVENOUS at 09:07

## 2021-02-18 RX ADMIN — FENTANYL CITRATE 50 MCG: 50 INJECTION, SOLUTION INTRAMUSCULAR; INTRAVENOUS at 08:59

## 2021-02-18 RX ADMIN — PHENYLEPHRINE HYDROCHLORIDE 100 MCG: 10 INJECTION INTRAVENOUS at 08:50

## 2021-02-18 RX ADMIN — BUPIVACAINE HYDROCHLORIDE IN DEXTROSE 2 ML: 7.5 INJECTION, SOLUTION SUBARACHNOID at 07:52

## 2021-02-18 RX ADMIN — PHENYLEPHRINE HYDROCHLORIDE 100 MCG: 10 INJECTION INTRAVENOUS at 08:25

## 2021-02-18 RX ADMIN — SODIUM CHLORIDE, SODIUM LACTATE, POTASSIUM CHLORIDE, AND CALCIUM CHLORIDE: .6; .31; .03; .02 INJECTION, SOLUTION INTRAVENOUS at 08:15

## 2021-02-18 RX ADMIN — CEFAZOLIN SODIUM 3000 MG: 10 INJECTION, POWDER, FOR SOLUTION INTRAVENOUS at 07:37

## 2021-02-18 RX ADMIN — KETOROLAC TROMETHAMINE 30 MG: 30 INJECTION, SOLUTION INTRAMUSCULAR at 09:23

## 2021-02-18 RX ADMIN — MIDAZOLAM 1 MG: 1 INJECTION INTRAMUSCULAR; INTRAVENOUS at 08:59

## 2021-02-18 RX ADMIN — ONDANSETRON 4 MG: 2 INJECTION INTRAMUSCULAR; INTRAVENOUS at 10:08

## 2021-02-18 RX ADMIN — DOCUSATE SODIUM 100 MG: 100 CAPSULE, LIQUID FILLED ORAL at 17:14

## 2021-02-18 RX ADMIN — OXYCODONE HYDROCHLORIDE AND ACETAMINOPHEN 1 TABLET: 5; 325 TABLET ORAL at 10:07

## 2021-02-18 RX ADMIN — Medication 250 MILLI-UNITS/MIN: at 08:21

## 2021-02-18 RX ADMIN — SODIUM CHLORIDE, SODIUM LACTATE, POTASSIUM CHLORIDE, AND CALCIUM CHLORIDE 125 ML/HR: .6; .31; .03; .02 INJECTION, SOLUTION INTRAVENOUS at 11:42

## 2021-02-18 RX ADMIN — ACETAMINOPHEN 650 MG: 325 TABLET ORAL at 19:52

## 2021-02-18 NOTE — ANESTHESIA PROCEDURE NOTES
Spinal Block    Patient location during procedure: OR  Start time: 2/18/2021 7:52 AM  Reason for block: at surgeon's request  Staffing  Anesthesiologist: Maranda Bella DO  Performed: anesthesiologist   Preanesthetic Checklist  Completed: patient identified, site marked, surgical consent, pre-op evaluation, timeout performed, IV checked, risks and benefits discussed and monitors and equipment checked  Spinal Block  Patient position: sitting  Prep: Betadine  Patient monitoring: heart rate, continuous pulse ox, cardiac monitor and frequent blood pressure checks  Approach: midline  Location: L3-4  Injection technique: single-shot  Needle  Needle type: pencil-tip   Needle gauge: 24 G  Needle length: 5 cm  Assessment  Sensory level: T4  Injection Assessment:  negative aspiration for heme, no paresthesia on injection and positive aspiration for clear CSF    Post-procedure:  site cleaned

## 2021-02-18 NOTE — LACTATION NOTE
This note was copied from a baby's chart  Met with mother  Provided mother with Ready, Set, Baby booklet, as well as the twins packet of info  Discussed Skin to Skin contact an benefits to mom and baby  Talked about the delay of the first bath until baby has adjusted  Spoke about the benefits of rooming in  Feeding on cue and what that means for recognizing infant's hunger  Avoidance of pacifiers for the first month discussed  Talked about exclusive breastfeeding for the first 6 months  Positioning and latch reviewed as well as showing images of other feeding positions  Discussed the properties of a good latch in any position  Reviewed hand/manual expression  Discussed s/s that baby is getting enough milk and some s/s that breastfeeding dyad may need further help  Gave information on common concerns, what to expect the first few weeks after delivery, preparing for other caregivers, and how partners can help  Resources for support also provided  Mother verbalized breastfeeding is going well  Enc to call for assistance as needed,phone # given

## 2021-02-18 NOTE — OP NOTE
OPERATIVE REPORT  PATIENT NAME: Roro Damon    :  1990  MRN: [id number]  Pt Location: [facility] L[facility] OR ROOM 01    SURGERY DATE: 2021    Surgeon(s) and Role:     * Petros Ogden MD - Primary    Preop Diagnosis:  Di-di twin pregnancy  History of previous  section   Breech presentation     Procedure(s) (LRB):   SECTION () REPEAT (N/A)  LIGATION/COAGULATION TUBAL (Bilateral)    Specimen(s):  ID Type Source Tests Collected by Time Destination   1 :  Tissue (Placenta on Hold) OB Only Placenta TISSUE EXAM OB (PLACENTA) ONLY Petros Ogden MD 2021    2 :  Tissue (Placenta on Hold) OB Only Placenta TISSUE EXAM OB (PLACENTA) ONLY Petros Ogden MD 2021 08    3 : PRN Tissue Fallopian Tube, Right TISSUE EXAM Petros Ogden MD 2021 0818    4 : PRN Tissue Fallopian Tube, Left TISSUE EXAM Petros Ogden MD 2021 0818    A :  Cord Blood Cord BLOOD GAS, VENOUS, CORD, BLOOD GAS, ARTERIAL, CORD Petros Ogden MD 2021 0818    B :  Cord Blood Cord BLOOD GAS, VENOUS, CORD, BLOOD GAS, ARTERIAL, CORD Petros Ogden MD 2021 0818        Estimated Blood Loss:   Minimal    Drains:  Urethral Catheter Straight-tip (Active)   Site Assessment Clean;Skin intact 21 0759   Collection Container Standard drainage bag 21 0759   Securement Method Securing device (Describe) 21 0759   Number of days: 0       Anesthesia Type:   Spinal     Operative Indications:  Di-di twin pregnancy  History of previous  section   Breech presentation         Findings baby A:  1  Delivery of viable male on 21 at 0817, weight 7lbs 7 8oz;  Apgar scores of 9 at one minute and 9 at five minutes  Umbilical artery pH 7 2 (base excess -2 5)  2  Normal appearing placenta with centrally-inserted 3 vessel cord  3  Clear amniotic fluid    Findings baby B  1   Delivery of viable female on 21 at [address] [place] [address], weight 8lbs 1oz; Apgar scores of 9 at one minute and 9 at five minutes  Umbilical artery pH 7 2 (base excess -2 8)  2  Normal appearing placenta with centrally-inserted 3 vessel cord  3  Clear amniotic fluid    4  Grossly normal uterus, tubes, and ovaries    Specimens:   1  Arterial and venous cord gases both babies   2  Cord blood both babies   3  Segment of umbilical cord both babies   4  Placenta to pathology     Estimated Blood Loss:  1155 mL      Drains: Ma catheter           Complications:  None; patient tolerated the procedure well  Disposition: PACU            Condition: stable    Procedure Details   Decision was made to proceed with  section due to di/di twin pregnancy and Hx of previous  section  Patient was made aware of these findings and the proposed plan  Risks, benefits, possible complications, alternate treatment options, and expected outcomes were discussed with the patient  The patient agreed with the proposed plan and gave informed consent  The patient was taken to the operating room where she was properly identified to the OR staff and attending physician  She received spinal anesthesia  by Dr Topher Vital preoperatively  Fetal heart tones were appreciated and found to be appropriate  A Ma catheter was aseptically inserted and SCDs were placed  The vagina was prepped with betadine and the abdomen was prepped with Chloraprep  Following appropriate drying time, the patient was draped in the usual sterile manner for a Pfannenstiel incision  The patient had received Ancef 3 g IV pre-operatively for prophylaxis  A Time Out was held and the above information confirmed  The patient was identified as Lluvia Lute and the procedure verified as  Delivery  A Pfannenstiel incision was made and carried down through the underlying subcutaneous tissue to the fascia using a scalpel  Rectus fascia was then incised in the midline and extended laterally using Pérez scissors  The superior edge of the fascia was grasped with Kocher clamps, tented upward, and the underlying muscle was bluntly dissected off  The inferior edge was grasped with Kocher clamps and cleared in similar fashion  All anatomic layers were well-demarcated  The rectus muscles were  and the peritoneum was identified and subsequently entered and extended longitudinally with blunt dissection  The vesicouterine peritoneum was identified and a bladder flap was created using Metzenbaum scissors  The bladder blade was inserted  A low transverse uterine incision was made with the scalpel and extended laterally with blunt dissection  The amnion of baby A was entered sharply  Surgeons hand was inserted through the hysterotomy and the fetal buttocks of baby A were palpated and elevated to the hysterotomy  The fetal hips were gently grasped and the the fetus was delivered  The bilateral legs were gently delivered using Pinard's maneuver  The fetus was delivered to the level of the scapula, gently rotated to allow for gentle sweeping and delivery of the upper extremities  The head delivered spontaneously  Baby had spontaneous cry with good color and tone  The umbilical cord was clamped and cut  The infant was handed off to the  providers  The amnion of baby B was entered sharply  Surgeons hand was inserted through the hysterotomy and the fetal buttocks of baby B were palpated and elevated to the hysterotomy  The fetal hips were gently grasped and the the fetus was delivered  The bilateral legs were gently delivered using Pinard's maneuver  The fetus was delivered to the level of the scapula, gently rotated to allow for gentle sweeping and delivery of the upper extremities  The head delivered spontaneously  Baby had spontaneous cry with good color and tone  The umbilical cord was clamped and cut  The infant was handed off to the  providers   Arterial and venous cord gases, cord blood, and a segment of umbilical cord were obtained from both babies and promptly sent to the lab  The placenta delivered spontaneously with uterine fundal massage and was noted to have a centrally inserted 3 vessel cord  This was also sent to the lab for placental pathology  The uterus was exteriorized and a moist lap sponge was used to clear the cavity of clots and products of conception  The uterine incision was closed with a running locked suture of 0 Vicryl  A second layer of the same suture was used to imbricate the first   Good hemostasis was confirmed upon uterine closure  The posterior culdesac was cleaned with a wet lap and suction  At this point, our attention turned to the adnexa  The bilateral Fallopian tubes were identified  First the left Fallopian tube was identified and grasped with a Hezzie Marking in an avascular area and tented up, several windows were opened with use of the Bovie electrocautery on the mesosalpinx   Using a 2 0 plain gut suture the tube and mesosalpix was doubly ligated with subsequent salpingectomy  Good hemostasis was noted from this tube  The right Fallopian tube was identified and doubly ligated in the same manner  Good hemostasis was noted on this side  The paracolic gutters were inspected and cleared of all clots and debris with irrigation and moist lap sponges  The fascia was closed with a running suture of 0 Vicryl  Subcutaneous tissues were closed with 2-0 plain gut suture  The skin was closed with 4-0 Monocryl in a subcuticular fashion  Sterile dressing was applied and an abdominal binder was then placed  At the conclusion of the procedure, all needle, sponge, and instrument counts were noted to be correct x2  The patient tolerated the procedure well and was transferred to her the recovery room in stable condition  Dr Michell Soria  was present and participated in all key portions of the case        SIGNATURE: Wilfred Brownlee MD  DATE: February 18, 2021  TIME: 10:16 AM

## 2021-02-18 NOTE — H&P
H&P Exam - Obstetrics   Adrienne Dawn 26 y o  female MRN: [id number]  Unit/Bed#: L&D 329-01 Encounter: [id number]      History of Present Illness     Chief Complaint: Elective  Section, Repeat +BTL    HPI:  Adrienne Dawn is a 26 y o   female with an ROSAURA of 3/4/2021, by Ultrasound at 38w0d weeks gestation who is being admitted for repeat  section in setting of di-di twin pregnancy + BTL  Pregnancy complicated by maternal BMI of 48, history of 1 previous  section, GBS positive and multiple pregnant  Last estimated fetal weight 2021 baby A 75 percentile baby B 80 percentile, last amniotic fluid index with normal limits in both babies  Baby a vertex presentation baby B breech presentation, placenta left lateral for fetus 1 and fundal for fetus 2  Starting hemoglobin 11 5  GBS positive  Rh positive    Contractions: no  Loss of fluid: no  Vaginal bleeding: no  Fetal movement: yes    She is [facility] patient  PREGNANCY COMPLICATIONS:   1) GBS positive  2) Di-di twin pregnancy  3) History of previous  section  4) Maternal BMI 50      OB History    Para Term  AB Living   2 1 1     1   SAB TAB Ectopic Multiple Live Births         0 1      # Outcome Date GA Lbr David/2nd Weight Sex Delivery Anes PTL Lv   2 Current            1 Term 17 39w6d  3912 g (8 lb 10 oz) F CS-LTranv Spinal N WANG       Baby complications/comments: Last estimated fetal weight 2021 baby A 75 percentile baby B 89 percentile, last amniotic fluid index with normal limits in both babies  Baby a vertex presentation baby B breech presentation, placenta left lateral for fetus 1 and fundal for fetus 2  Review of Systems   Constitutional: Negative for chills and fever  HENT: Negative for ear pain and sore throat  Eyes: Negative for pain and visual disturbance  Respiratory: Negative for cough and shortness of breath      Cardiovascular: Negative for chest pain and palpitations  Gastrointestinal: Negative for abdominal pain and vomiting  Genitourinary: Negative for dysuria and hematuria  Musculoskeletal: Negative for arthralgias and back pain  Skin: Negative for color change and rash  Neurological: Negative for seizures and syncope  All other systems reviewed and are negative  Historical Information   Past Medical History:   Diagnosis Date    Ear infection     Shingles     Urinary tract infection     Varicella      Past Surgical History:   Procedure Laterality Date     SECTION      NE  DELIVERY ONLY N/A 2017    Procedure:  SECTION (); Surgeon: Tanya Vela MD;  Location: [facility];  Service: Obstetrics    WISDOM TOOTH EXTRACTION       Social History   Social History     Substance and Sexual Activity   Alcohol Use Not Currently    Frequency: 2-4 times a month    Comment: prior to pregnancy     Social History     Substance and Sexual Activity   Drug Use Never     Social History     Tobacco Use   Smoking Status Never Smoker   Smokeless Tobacco Never Used     Family History: non-contributory    Meds/Allergies      Medications Prior to Admission   Medication    aspirin (ECOTRIN LOW STRENGTH) 81 mg EC tablet    calcium carbonate (OS-SURENDRA) 600 MG tablet    famotidine (PEPCID) 10 mg tablet    ferrous sulfate 324 (65 Fe) mg    Prenatal Vit-Fe Fumarate-FA (PRENATAL VITAMIN PO)      No Known Allergies    OBJECTIVE:    Vitals: Blood pressure 118/70, pulse 96, temperature 98 4 °F (36 9 °C), temperature source Oral, resp  rate 18, height 5' 4" (1 626 m), weight 127 kg (281 lb), last menstrual period 2020, currently breastfeeding  Body mass index is 48 23 kg/m²  Physical Exam  Constitutional:       Appearance: She is well-developed  HENT:      Head: Normocephalic and atraumatic  Eyes:      Conjunctiva/sclera: Conjunctivae normal       Pupils: Pupils are equal, round, and reactive to light     Neck: Musculoskeletal: Normal range of motion and neck supple  Cardiovascular:      Rate and Rhythm: Normal rate and regular rhythm  Heart sounds: Normal heart sounds  Pulmonary:      Effort: Pulmonary effort is normal       Breath sounds: Normal breath sounds  Abdominal:      General: Bowel sounds are normal       Palpations: Abdomen is soft  Genitourinary:     Vagina: Normal    Musculoskeletal: Normal range of motion  Skin:     General: Skin is warm  Neurological:      Mental Status: She is alert and oriented to person, place, and time  Fetal heart rate: baby A 120s Baby B 130s       [employer]: none       EFW: Last estimated fetal weight 02/5/2021 baby A 75 percentile baby B 89 percentile, last amniotic fluid index with normal limits in both babies  Baby a vertex presentation baby B breech presentation, placenta left lateral for fetus 1 and fundal for fetus 2       GBS: positive    Prenatal Labs:   Blood Type:   Lab Results   Component Value Date/Time    ABO Grouping AB 08/04/2020 10:32 AM    ABO Grouping AB 06/09/2017 12:00 AM     , D (Rh type):   Lab Results   Component Value Date/Time    Rh Factor Positive 08/04/2020 10:32 AM    Rh Factor Positive 06/09/2017 12:00 AM     , Antibody Screen:   Lab Results   Component Value Date/Time    Antibody Screen Negative 06/09/2017 12:00 AM    , HCT/HGB:   Lab Results   Component Value Date/Time    Hematocrit 35 8 02/18/2021 06:36 AM    Hematocrit 40 4 06/09/2017 12:00 AM    Hemoglobin 11 5 02/18/2021 06:36 AM    Hemoglobin 13 7 06/09/2017 12:00 AM      , MCV:   Lab Results   Component Value Date/Time    MCV 93 02/18/2021 06:36 AM    MCV 89 06/09/2017 12:00 AM      , Platelets:   Lab Results   Component Value Date/Time    Platelets 796 99/71/7779 06:36 AM    Platelets 031 58/04/8432 12:00 AM      , 1 hour Glucola:   Lab Results   Component Value Date/Time    Glucose 116 12/04/2020 10:11 AM   , Rubella:   Lab Results   Component Value Date/Time    Rubella IgG Quant 48 2 2020 10:32 AM        , VDRL/RPR:   Lab Results   Component Value Date/Time    RPR SCREEN Non Reactive 2017 12:00 AM    RPR Non-Reactive 2020 10:32 AM      , Hep B:   Lab Results   Component Value Date/Time    HEPATITIS B SURFACE ANTIGEN Negative 2017 12:00 AM    Hepatitis B Surface Ag Non-reactive 2020 10:32 AM     , HIV:   Lab Results   Component Value Date/Time    HIV-1/2 AB-AG Non Reactive 2017 12:00 AM    HIV-1/HIV-2 Ab Non-Reactive 2020 10:32 AM     , Gonorrhea:   Lab Results   Component Value Date/Time    N gonorrhoeae, DNA Probe Negative 2020 09:21 AM     , Group B Strep:    Lab Results   Component Value Date/Time    Strep Grp B PCR Positive for Beta Hemolytic Strep Grp B by PCR (A) 2021 10:55 AM          Invasive Devices     Peripheral Intravenous Line            Peripheral IV 21 Dorsal (posterior); Left Hand less than 1 day                  Assessment/Plan     ASSESSMENT:  29yo  at 38w0d weeks gestation who is being admitted for repeat  section +BTL  Pregnancy complicated by: di-di twin pregnancy, BMI 46, positive GBS, history of previous  section  FHT: A:120 B 130  Clinical EFW:Last estimated fetal weight 2021 baby A 75 percentile baby B 89 percentile, last amniotic fluid index with normal limits in both babies  Baby a vertex presentation baby B breech presentation, placenta left lateral for fetus 1 and fundal for fetus 2  GBS status: positive  Rh: positive    PLAN:    - Admit  - CBC, RPR, Blood Type  - Start with presurgical antibiotics  - GBS positive  - Anesthesia consult  - Anticipate repeat low-transverse  section    Discussed with Dr Joeseph Kawasaki       This patient will be an INPATIENT  and I certify the anticipated length of stay is >2 Midnights      Kamilla Lake MD  2021  6:51 AM

## 2021-02-18 NOTE — ANESTHESIA POSTPROCEDURE EVALUATION
Post-Op Assessment Note    CV Status:  Stable  Pain Score: 2    Pain management: adequate     Mental Status:  Alert and awake   Hydration Status:  Euvolemic   PONV Controlled:  Controlled   Airway Patency:  Patent      Post Op Vitals Reviewed: Yes      Staff: Anesthesiologist         No complications documented      BP      Temp     Pulse     Resp      SpO2

## 2021-02-18 NOTE — ANESTHESIA PREPROCEDURE EVALUATION
Procedure:   SECTION () REPEAT (N/A Uterus)  LIGATION/COAGULATION TUBAL (Bilateral Abdomen)    Relevant Problems   GYN   (+) 38 weeks gestation of pregnancy        Physical Exam    Airway    Mallampati score: II  TM Distance: <3 FB  Neck ROM: full     Dental       Cardiovascular  Rhythm: regular, Rate: normal,     Pulmonary  Breath sounds clear to auscultation,     Other Findings        Anesthesia Plan  ASA Score- 2     Anesthesia Type- spinal with ASA Monitors  Additional Monitors:   Airway Plan:           Plan Factors-Exercise tolerance (METS): >4 METS  Chart reviewed  Existing labs reviewed  Patient summary reviewed  Patient is not a current smoker  Patient not instructed to abstain from smoking on day of procedure  Patient did not smoke on day of surgery  Obstructive sleep apnea risk education given perioperatively  Induction- intravenous  Postoperative Plan-     Informed Consent- Anesthetic plan and risks discussed with patient

## 2021-02-19 LAB
ERYTHROCYTE [DISTWIDTH] IN BLOOD BY AUTOMATED COUNT: 13.7 % (ref 11.6–15.1)
HCT VFR BLD AUTO: 31.2 % (ref 34.8–46.1)
HGB BLD-MCNC: 10.1 G/DL (ref 11.5–15.4)
MCH RBC QN AUTO: 30 PG (ref 26.8–34.3)
MCHC RBC AUTO-ENTMCNC: 32.4 G/DL (ref 31.4–37.4)
MCV RBC AUTO: 93 FL (ref 82–98)
PLATELET # BLD AUTO: 132 THOUSANDS/UL (ref 149–390)
PMV BLD AUTO: 11 FL (ref 8.9–12.7)
RBC # BLD AUTO: 3.37 MILLION/UL (ref 3.81–5.12)
RPR SER QL: NORMAL
WBC # BLD AUTO: 8.51 THOUSAND/UL (ref 4.31–10.16)

## 2021-02-19 PROCEDURE — 99024 POSTOP FOLLOW-UP VISIT: CPT | Performed by: OBSTETRICS & GYNECOLOGY

## 2021-02-19 PROCEDURE — 85027 COMPLETE CBC AUTOMATED: CPT | Performed by: OBSTETRICS & GYNECOLOGY

## 2021-02-19 RX ORDER — SIMETHICONE 80 MG
80 TABLET,CHEWABLE ORAL EVERY 6 HOURS PRN
Status: DISCONTINUED | OUTPATIENT
Start: 2021-02-19 | End: 2021-02-20 | Stop reason: HOSPADM

## 2021-02-19 RX ADMIN — IBUPROFEN 600 MG: 600 TABLET ORAL at 19:24

## 2021-02-19 RX ADMIN — OXYCODONE HYDROCHLORIDE AND ACETAMINOPHEN 1 TABLET: 5; 325 TABLET ORAL at 04:21

## 2021-02-19 RX ADMIN — OXYCODONE HYDROCHLORIDE AND ACETAMINOPHEN 1 TABLET: 5; 325 TABLET ORAL at 16:51

## 2021-02-19 RX ADMIN — OXYCODONE HYDROCHLORIDE AND ACETAMINOPHEN 1 TABLET: 5; 325 TABLET ORAL at 21:19

## 2021-02-19 RX ADMIN — DOCUSATE SODIUM 100 MG: 100 CAPSULE, LIQUID FILLED ORAL at 17:56

## 2021-02-19 RX ADMIN — ENOXAPARIN SODIUM 40 MG: 40 INJECTION SUBCUTANEOUS at 09:32

## 2021-02-19 RX ADMIN — IBUPROFEN 600 MG: 600 TABLET ORAL at 13:23

## 2021-02-19 RX ADMIN — ENOXAPARIN SODIUM 40 MG: 40 INJECTION SUBCUTANEOUS at 22:07

## 2021-02-19 RX ADMIN — IBUPROFEN 600 MG: 600 TABLET ORAL at 07:26

## 2021-02-19 RX ADMIN — DOCUSATE SODIUM 100 MG: 100 CAPSULE, LIQUID FILLED ORAL at 09:32

## 2021-02-19 RX ADMIN — OXYCODONE HYDROCHLORIDE AND ACETAMINOPHEN 1 TABLET: 5; 325 TABLET ORAL at 09:33

## 2021-02-19 RX ADMIN — KETOROLAC TROMETHAMINE 30 MG: 30 INJECTION, SOLUTION INTRAMUSCULAR at 00:32

## 2021-02-19 NOTE — PLAN OF CARE
Problem: Potential for Falls  Goal: Patient will remain free of falls  Description: INTERVENTIONS:  - Assess patient frequently for physical needs  -  Identify cognitive and physical deficits and behaviors that affect risk of falls    -  [place] fall precautions as indicated by assessment   - Educate patient/family on patient safety including physical limitations  - Instruct patient to call for assistance with activity based on assessment  - Modify environment to reduce risk of injury  - Consider OT/PT consult to assist with strengthening/mobility  Outcome: Progressing     Problem: POSTPARTUM  Goal: Experiences normal postpartum course  Description: INTERVENTIONS:  - Monitor maternal vital signs  - Assess uterine involution and lochia  Outcome: Progressing  Goal: Appropriate maternal -  bonding  Description: INTERVENTIONS:  - Identify family support  - Assess for appropriate maternal/infant bonding   -Encourage maternal/infant bonding opportunities  - Referral to  or  as needed  Outcome: Progressing  Goal: Establishment of infant feeding pattern  Description: INTERVENTIONS:  - Assess breast/bottle feeding  - Refer to lactation as needed  Outcome: Progressing  Goal: Incision(s), wounds(s) or drain site(s) healing without S/S of infection  Description: INTERVENTIONS  - Assess and document risk factors for skin impairment   - Assess and document dressing, incision, wound bed, drain sites and surrounding tissue  - Consider nutrition services referral as needed  - Oral mucous membranes remain intact  - Provide patient/ family education  Outcome: Progressing     Problem: PAIN - ADULT  Goal: Verbalizes/displays adequate comfort level or baseline comfort level  Description: Interventions:  - Encourage patient to monitor pain and request assistance  - Assess pain using appropriate pain scale  - Administer analgesics based on type and severity of pain and evaluate response  - Implement non-pharmacological measures as appropriate and evaluate response  - Consider cultural and social influences on pain and pain management  - Notify physician/advanced practitioner if interventions unsuccessful or patient reports new pain  Outcome: Progressing     Problem: INFECTION - ADULT  Goal: Absence or prevention of progression during hospitalization  Description: INTERVENTIONS:  - Assess and monitor for signs and symptoms of infection  - Monitor lab/diagnostic results  - Monitor all insertion sites, i e  indwelling lines, tubes, and drains  - Monitor endotracheal if appropriate and nasal secretions for changes in amount and color  - [place] appropriate cooling/warming therapies per order  - Administer medications as ordered  - Instruct and encourage patient and family to use good hand hygiene technique  - Identify and instruct in appropriate isolation precautions for identified infection/condition  Outcome: Progressing  Goal: Absence of fever/infection during neutropenic period  Description: INTERVENTIONS:  - Monitor WBC    Outcome: Progressing     Problem: SAFETY ADULT  Goal: Patient will remain free of falls  Description: INTERVENTIONS:  - Assess patient frequently for physical needs  -  Identify cognitive and physical deficits and behaviors that affect risk of falls    -  [place] fall precautions as indicated by assessment   - Educate patient/family on patient safety including physical limitations  - Instruct patient to call for assistance with activity based on assessment  - Modify environment to reduce risk of injury  - Consider OT/PT consult to assist with strengthening/mobility  Outcome: Progressing  Goal: Maintain or return to baseline ADL function  Description: INTERVENTIONS:  -  Assess patient's ability to carry out ADLs; assess patient's baseline for ADL function and identify physical deficits which impact ability to perform ADLs (bathing, care of mouth/teeth, toileting, grooming, dressing, etc )  - Assess/evaluate cause of self-care deficits   - Assess range of motion  - Assess patient's mobility; develop plan if impaired  - Assess patient's need for assistive devices and provide as appropriate  - Encourage maximum independence but intervene and supervise when necessary  - Involve family in performance of ADLs  - Assess for home care needs following discharge   - Consider OT consult to assist with ADL evaluation and planning for discharge  - Provide patient education as appropriate  Outcome: Progressing  Goal: Maintain or return mobility status to optimal level  Description: INTERVENTIONS:  - Assess patient's baseline mobility status (ambulation, transfers, stairs, etc )    - Identify cognitive and physical deficits and behaviors that affect mobility  - Identify mobility aids required to assist with transfers and/or ambulation (gait belt, sit-to-stand, lift, walker, cane, etc )  - [place] fall precautions as indicated by assessment  - Record patient progress and toleration of activity level on Mobility SBAR; progress patient to next Phase/Stage  - Instruct patient to call for assistance with activity based on assessment  - Consider rehabilitation consult to assist with strengthening/weightbearing, etc   Outcome: Progressing     Problem: Knowledge Deficit  Goal: Patient/family/caregiver demonstrates understanding of disease process, treatment plan, medications, and discharge instructions  Description: Complete learning assessment and assess knowledge base    Interventions:  - Provide teaching at level of understanding  - Provide teaching via preferred learning methods  Outcome: Progressing     Problem: DISCHARGE PLANNING  Goal: Discharge to home or other facility with appropriate resources  Description: INTERVENTIONS:  - Identify barriers to discharge w/patient and caregiver  - Arrange for needed discharge resources and transportation as appropriate  - Identify discharge learning needs (meds, wound care, etc )  - Arrange for interpretive services to assist at discharge as needed  - Refer to Case Management Department for coordinating discharge planning if the patient needs post-hospital services based on physician/advanced practitioner order or complex needs related to functional status, cognitive ability, or social support system  Outcome: Progressing

## 2021-02-19 NOTE — PROGRESS NOTES
Progress Note - OB/GYN   Laura Bolus 26 y o  female MRN: [id number]  Unit/Bed#: L&D 312-01 Encounter: [id number]      Assessment:  Post operative day #1 s/p RLTCS+BS (di-di twins), stable, and doing well    Plan:  1  Hemodynamically stable   - QBL 1155   - Pre-op Hb 11 5 --> post-op Hb 10 1   - Vitals WNL, currently asymptomatic  2  Ma catheter   - Removed this am   - Making adequate urine output   - F/u voiding trial  3  DVT ppx   - Lovenox 40 BID to start this AM  4  Continue routine post partum care   - Encourage ambulation   - Encourage breastfeeding  5  Continue current meds   - See list below   - Pain adequately controlled  Transition to PO analgesics today  6  Future contraception   - S/p bilateral salpingectomy  7  Disposition   - Stable   - Anticipate discharge home POD#3    Subjective/Objective     Chief Complaint:     Doing well this morning  Incisional pain improved with percocet    Subjective:     Pain: yes  Tolerating Oral Intake: yes  Voiding: yes  Flatus: no  Bowel Movement: no  Ambulating: yes  Breastfeeding: Breastfeeding  Chest Pain: no  Shortness of Breath: no  Leg Pain/Discomfort: no  Lochia: normal    Objective:   Vitals:   /82 (BP Location: Left arm)   Pulse 77   Temp 98 9 °F (37 2 °C) (Oral)   Resp 18   Ht 5' 4" (1 626 m)   Wt 127 kg (281 lb)   LMP 05/20/2020   SpO2 97%   Breastfeeding Yes   BMI 48 23 kg/m²   Body mass index is 48 23 kg/m²    I/O       02/17 0701 - 02/18 0700 02/18 0701 - 02/19 0700 02/19 0701 - 02/20 0700    I V  (mL/kg)  1700 (13 4)     Total Intake(mL/kg)  1700 (13 4)     Urine (mL/kg/hr)  3375 (1 1)     Blood  1155     Total Output  4530     Net  -2830                Lab Results   Component Value Date    WBC 8 51 02/19/2021    HGB 10 1 (L) 02/19/2021    HCT 31 2 (L) 02/19/2021    MCV 93 02/19/2021     (L) 02/19/2021       Meds/Allergies     Physical Exam:  General: in no apparent distress and well developed and well nourished  Cardiovascular: Cor RRR  Lungs: clear to auscultation bilaterally  Abdomen: abdomen is soft without significant tenderness, masses, organomegaly or guarding; incision c/d/i closed with running absorbable suture  Fundus: Firm and appropriately tender to palpation, at the level of the umbilicus  Lower extremeties: nontender, SCDs in place bilaterally    Labs/Tests:   Recent Results (from the past 24 hour(s))   Blood gas, venous, cord    Collection Time: 02/18/21  8:18 AM   Result Value Ref Range    pH, Cord Nigel 7 347 7 190 - 7 490    pCO2, Cord Nigel 43 5 (H) 27 0 - 43 0 mm HG    pO2, Cord Nigel 34 9 15 0 - 45 0 mm HG    HCO3, Cord Nigel 23 3 12 2 - 28 6 mmol/L    Base Exc, Cord Nigel -2 4 (L) 1 0 - 9 0 mmol/L    O2 Cont, Cord Nigel 17 1 mL/dL    O2 HGB,VENOUS CORD 79 2 %   Blood gas, arterial, cord    Collection Time: 02/18/21  8:18 AM   Result Value Ref Range    pH, Cord Art 7 286 7 230 - 7 430    pCO2, Cord Art 53 6 30 0 - 60 0    pO2, Cord Art 15 9 5 0 - 25 0 mm HG    HCO3, Cord Art 25 0 17 3 - 27 3 mmol/L    Base Exc, Cord Art -2 5 (L) 3 0 - 11 0 mmol/L    O2 Content, Cord Art 6 2 ml/dl    O2 Hgb, Arterial Cord 29 8 %   Blood gas, venous, cord    Collection Time: 02/18/21  8:18 AM   Result Value Ref Range    pH, Cord Nigel 7 312 7 190 - 7 490    pCO2, Cord Nigel 52 3 (H) 27 0 - 43 0 mm HG    pO2, Cord Nigel 19 2 15 0 - 45 0 mm HG    HCO3, Cord Nigel 25 9 12 2 - 28 6 mmol/L    Base Exc, Cord Nigel -1 2 (L) 1 0 - 9 0 mmol/L    O2 Cont, Cord Nigel 9 3 mL/dL    O2 HGB,VENOUS CORD 42 4 %   Blood gas, arterial, cord    Collection Time: 02/18/21  8:18 AM   Result Value Ref Range    pH, Cord Art 7 296 7 230 - 7 430    pCO2, Cord Art 50 9 30 0 - 60 0    pO2, Cord Art 17 2 5 0 - 25 0 mm HG    HCO3, Cord Art 24 3 17 3 - 27 3 mmol/L    Base Exc, Cord Art -2 8 (L) 3 0 - 11 0 mmol/L    O2 Content, Cord Art 7 3 ml/dl    O2 Hgb, Arterial Cord 34 5 %   CBC    Collection Time: 02/19/21 12:43 AM   Result Value Ref Range    WBC 8 51 4 31 - 10 16 Thousand/uL    RBC 3 37 (L) 3 81 - 5 12 Million/uL    Hemoglobin 10 1 (L) 11 5 - 15 4 g/dL    Hematocrit 31 2 (L) 34 8 - 46 1 %    MCV 93 82 - 98 fL    MCH 30 0 26 8 - 34 3 pg    MCHC 32 4 31 4 - 37 4 g/dL    RDW 13 7 11 6 - 15 1 %    Platelets 848 (L) 934 - 390 Thousands/uL    MPV 11 0 8 9 - 12 7 fL       MEDS:   Current Facility-Administered Medications   Medication Dose Route Frequency    acetaminophen (TYLENOL) tablet 650 mg  650 mg Oral Q4H PRN    calcium carbonate (TUMS) chewable tablet 1,000 mg  1,000 mg Oral Daily PRN    diphenhydrAMINE (BENADRYL) tablet 25 mg  25 mg Oral Q6H PRN    docusate sodium (COLACE) capsule 100 mg  100 mg Oral BID    enoxaparin (LOVENOX) subcutaneous injection 40 mg  40 mg Subcutaneous Q12H OTF    ibuprofen (MOTRIN) tablet 600 mg  600 mg Oral Q6H PRN    ketorolac (TORADOL) injection 30 mg  30 mg Intravenous Q6H PRN    lactated ringers infusion  125 mL/hr Intravenous Continuous    ondansetron (ZOFRAN) injection 4 mg  4 mg Intravenous Q8H PRN    oxyCODONE-acetaminophen (PERCOCET) 5-325 mg per tablet 1 tablet  1 tablet Oral Q4H PRN    oxyCODONE-acetaminophen (PERCOCET) 5-325 mg per tablet 2 tablet  2 tablet Oral Q4H PRN    oxytocin (PITOCIN) 30 Units in lactated ringers 500 mL infusion  62 5 [place]-units/min Intravenous Once     Invasive Devices     Peripheral Intravenous Line            Peripheral IV 02/18/21 Dorsal (posterior); Left Hand 1 day              Timo Chamberlain MD  OB/GYN PGY-1  7:38 AM  02/19/21

## 2021-02-19 NOTE — PLAN OF CARE
Problem: Potential for Falls  Goal: Patient will remain free of falls  Description: INTERVENTIONS:  - Assess patient frequently for physical needs  -  Identify cognitive and physical deficits and behaviors that affect risk of falls    -  [place] fall precautions as indicated by assessment   - Educate patient/family on patient safety including physical limitations  - Instruct patient to call for assistance with activity based on assessment  - Modify environment to reduce risk of injury  - Consider OT/PT consult to assist with strengthening/mobility  Outcome: Progressing     Problem: POSTPARTUM  Goal: Experiences normal postpartum course  Description: INTERVENTIONS:  - Monitor maternal vital signs  - Assess uterine involution and lochia  Outcome: Progressing  Goal: Appropriate maternal -  bonding  Description: INTERVENTIONS:  - Identify family support  - Assess for appropriate maternal/infant bonding   -Encourage maternal/infant bonding opportunities  - Referral to  or  as needed  Outcome: Progressing  Goal: Establishment of infant feeding pattern  Description: INTERVENTIONS:  - Assess breast/bottle feeding  - Refer to lactation as needed  Outcome: Progressing  Goal: Incision(s), wounds(s) or drain site(s) healing without S/S of infection  Description: INTERVENTIONS  - Assess and document risk factors for skin impairment   - Assess and document dressing, incision, wound bed, drain sites and surrounding tissue  - Consider nutrition services referral as needed  - Oral mucous membranes remain intact  - Provide patient/ family education  Outcome: Progressing     Problem: PAIN - ADULT  Goal: Verbalizes/displays adequate comfort level or baseline comfort level  Description: Interventions:  - Encourage patient to monitor pain and request assistance  - Assess pain using appropriate pain scale  - Administer analgesics based on type and severity of pain and evaluate response  - Implement non-pharmacological measures as appropriate and evaluate response  - Consider cultural and social influences on pain and pain management  - Notify physician/advanced practitioner if interventions unsuccessful or patient reports new pain  Outcome: Progressing     Problem: INFECTION - ADULT  Goal: Absence or prevention of progression during hospitalization  Description: INTERVENTIONS:  - Assess and monitor for signs and symptoms of infection  - Monitor lab/diagnostic results  - Monitor all insertion sites, i e  indwelling lines, tubes, and drains  - Monitor endotracheal if appropriate and nasal secretions for changes in amount and color  - [place] appropriate cooling/warming therapies per order  - Administer medications as ordered  - Instruct and encourage patient and family to use good hand hygiene technique  - Identify and instruct in appropriate isolation precautions for identified infection/condition  Outcome: Progressing  Goal: Absence of fever/infection during neutropenic period  Description: INTERVENTIONS:  - Monitor WBC    Outcome: Progressing     Problem: SAFETY ADULT  Goal: Patient will remain free of falls  Description: INTERVENTIONS:  - Assess patient frequently for physical needs  -  Identify cognitive and physical deficits and behaviors that affect risk of falls    -  [place] fall precautions as indicated by assessment   - Educate patient/family on patient safety including physical limitations  - Instruct patient to call for assistance with activity based on assessment  - Modify environment to reduce risk of injury  - Consider OT/PT consult to assist with strengthening/mobility  Outcome: Progressing  Goal: Maintain or return to baseline ADL function  Description: INTERVENTIONS:  -  Assess patient's ability to carry out ADLs; assess patient's baseline for ADL function and identify physical deficits which impact ability to perform ADLs (bathing, care of mouth/teeth, toileting, grooming, dressing, etc )  - Assess/evaluate cause of self-care deficits   - Assess range of motion  - Assess patient's mobility; develop plan if impaired  - Assess patient's need for assistive devices and provide as appropriate  - Encourage maximum independence but intervene and supervise when necessary  - Involve family in performance of ADLs  - Assess for home care needs following discharge   - Consider OT consult to assist with ADL evaluation and planning for discharge  - Provide patient education as appropriate  Outcome: Progressing  Goal: Maintain or return mobility status to optimal level  Description: INTERVENTIONS:  - Assess patient's baseline mobility status (ambulation, transfers, stairs, etc )    - Identify cognitive and physical deficits and behaviors that affect mobility  - Identify mobility aids required to assist with transfers and/or ambulation (gait belt, sit-to-stand, lift, walker, cane, etc )  - [place] fall precautions as indicated by assessment  - Record patient progress and toleration of activity level on Mobility SBAR; progress patient to next Phase/Stage  - Instruct patient to call for assistance with activity based on assessment  - Consider rehabilitation consult to assist with strengthening/weightbearing, etc   Outcome: Progressing     Problem: Knowledge Deficit  Goal: Patient/family/caregiver demonstrates understanding of disease process, treatment plan, medications, and discharge instructions  Description: Complete learning assessment and assess knowledge base    Interventions:  - Provide teaching at level of understanding  - Provide teaching via preferred learning methods  Outcome: Progressing     Problem: DISCHARGE PLANNING  Goal: Discharge to home or other facility with appropriate resources  Description: INTERVENTIONS:  - Identify barriers to discharge w/patient and caregiver  - Arrange for needed discharge resources and transportation as appropriate  - Identify discharge learning needs (meds, wound care, etc )  - Arrange for interpretive services to assist at discharge as needed  - Refer to Case Management Department for coordinating discharge planning if the patient needs post-hospital services based on physician/advanced practitioner order or complex needs related to functional status, cognitive ability, or social support system  Outcome: Progressing

## 2021-02-20 VITALS
DIASTOLIC BLOOD PRESSURE: 84 MMHG | OXYGEN SATURATION: 98 % | WEIGHT: 281 LBS | HEIGHT: 64 IN | TEMPERATURE: 98 F | BODY MASS INDEX: 47.97 KG/M2 | RESPIRATION RATE: 18 BRPM | SYSTOLIC BLOOD PRESSURE: 115 MMHG | HEART RATE: 72 BPM

## 2021-02-20 PROBLEM — Z3A.38 38 WEEKS GESTATION OF PREGNANCY: Status: RESOLVED | Noted: 2021-01-07 | Resolved: 2021-02-20

## 2021-02-20 PROBLEM — O13.9 GESTATIONAL HYPERTENSION: Status: ACTIVE | Noted: 2021-02-20

## 2021-02-20 PROCEDURE — 99024 POSTOP FOLLOW-UP VISIT: CPT | Performed by: STUDENT IN AN ORGANIZED HEALTH CARE EDUCATION/TRAINING PROGRAM

## 2021-02-20 RX ORDER — ACETAMINOPHEN 325 MG/1
650 TABLET ORAL EVERY 4 HOURS PRN
Refills: 0
Start: 2021-02-20

## 2021-02-20 RX ORDER — OXYCODONE HYDROCHLORIDE AND ACETAMINOPHEN 5; 325 MG/1; MG/1
1 TABLET ORAL EVERY 4 HOURS PRN
Qty: 20 TABLET | Refills: 0 | Status: SHIPPED | OUTPATIENT
Start: 2021-02-20 | End: 2021-03-02

## 2021-02-20 RX ORDER — ACETAMINOPHEN 325 MG/1
650 TABLET ORAL EVERY 4 HOURS PRN
Refills: 0 | Status: CANCELLED
Start: 2021-02-20

## 2021-02-20 RX ORDER — IBUPROFEN 600 MG/1
600 TABLET ORAL EVERY 6 HOURS
Qty: 120 TABLET | Refills: 0
Start: 2021-02-20 | End: 2021-03-20

## 2021-02-20 RX ADMIN — IBUPROFEN 600 MG: 600 TABLET ORAL at 08:31

## 2021-02-20 RX ADMIN — ENOXAPARIN SODIUM 40 MG: 40 INJECTION SUBCUTANEOUS at 09:36

## 2021-02-20 RX ADMIN — OXYCODONE HYDROCHLORIDE AND ACETAMINOPHEN 1 TABLET: 5; 325 TABLET ORAL at 09:36

## 2021-02-20 RX ADMIN — OXYCODONE HYDROCHLORIDE AND ACETAMINOPHEN 1 TABLET: 5; 325 TABLET ORAL at 14:16

## 2021-02-20 RX ADMIN — OXYCODONE HYDROCHLORIDE AND ACETAMINOPHEN 1 TABLET: 5; 325 TABLET ORAL at 04:53

## 2021-02-20 RX ADMIN — IBUPROFEN 600 MG: 600 TABLET ORAL at 01:46

## 2021-02-20 RX ADMIN — DOCUSATE SODIUM 100 MG: 100 CAPSULE, LIQUID FILLED ORAL at 09:36

## 2021-02-20 NOTE — PLAN OF CARE
Problem: Potential for Falls  Goal: Patient will remain free of falls  Description: INTERVENTIONS:  - Assess patient frequently for physical needs  -  Identify cognitive and physical deficits and behaviors that affect risk of falls    -  [place] fall precautions as indicated by assessment   - Educate patient/family on patient safety including physical limitations  - Instruct patient to call for assistance with activity based on assessment  - Modify environment to reduce risk of injury  - Consider OT/PT consult to assist with strengthening/mobility  Outcome: Progressing     Problem: POSTPARTUM  Goal: Experiences normal postpartum course  Description: INTERVENTIONS:  - Monitor maternal vital signs  - Assess uterine involution and lochia  Outcome: Progressing  Goal: Appropriate maternal -  bonding  Description: INTERVENTIONS:  - Identify family support  - Assess for appropriate maternal/infant bonding   -Encourage maternal/infant bonding opportunities  - Referral to  or  as needed  Outcome: Progressing  Goal: Establishment of infant feeding pattern  Description: INTERVENTIONS:  - Assess breast/bottle feeding  - Refer to lactation as needed  Outcome: Progressing  Goal: Incision(s), wounds(s) or drain site(s) healing without S/S of infection  Description: INTERVENTIONS  - Assess and document risk factors for skin impairment   - Assess and document dressing, incision, wound bed, drain sites and surrounding tissue  - Consider nutrition services referral as needed  - Oral mucous membranes remain intact  - Provide patient/ family education  Outcome: Progressing     Problem: PAIN - ADULT  Goal: Verbalizes/displays adequate comfort level or baseline comfort level  Description: Interventions:  - Encourage patient to monitor pain and request assistance  - Assess pain using appropriate pain scale  - Administer analgesics based on type and severity of pain and evaluate response  - Implement non-pharmacological measures as appropriate and evaluate response  - Consider cultural and social influences on pain and pain management  - Notify physician/advanced practitioner if interventions unsuccessful or patient reports new pain  Outcome: Progressing     Problem: INFECTION - ADULT  Goal: Absence or prevention of progression during hospitalization  Description: INTERVENTIONS:  - Assess and monitor for signs and symptoms of infection  - Monitor lab/diagnostic results  - Monitor all insertion sites, i e  indwelling lines, tubes, and drains  - Monitor endotracheal if appropriate and nasal secretions for changes in amount and color  - [place] appropriate cooling/warming therapies per order  - Administer medications as ordered  - Instruct and encourage patient and family to use good hand hygiene technique  - Identify and instruct in appropriate isolation precautions for identified infection/condition  Outcome: Progressing  Goal: Absence of fever/infection during neutropenic period  Description: INTERVENTIONS:  - Monitor WBC    Outcome: Progressing     Problem: SAFETY ADULT  Goal: Patient will remain free of falls  Description: INTERVENTIONS:  - Assess patient frequently for physical needs  -  Identify cognitive and physical deficits and behaviors that affect risk of falls    -  [place] fall precautions as indicated by assessment   - Educate patient/family on patient safety including physical limitations  - Instruct patient to call for assistance with activity based on assessment  - Modify environment to reduce risk of injury  - Consider OT/PT consult to assist with strengthening/mobility  Outcome: Progressing  Goal: Maintain or return to baseline ADL function  Description: INTERVENTIONS:  -  Assess patient's ability to carry out ADLs; assess patient's baseline for ADL function and identify physical deficits which impact ability to perform ADLs (bathing, care of mouth/teeth, toileting, grooming, dressing, etc )  - Assess/evaluate cause of self-care deficits   - Assess range of motion  - Assess patient's mobility; develop plan if impaired  - Assess patient's need for assistive devices and provide as appropriate  - Encourage maximum independence but intervene and supervise when necessary  - Involve family in performance of ADLs  - Assess for home care needs following discharge   - Consider OT consult to assist with ADL evaluation and planning for discharge  - Provide patient education as appropriate  Outcome: Progressing  Goal: Maintain or return mobility status to optimal level  Description: INTERVENTIONS:  - Assess patient's baseline mobility status (ambulation, transfers, stairs, etc )    - Identify cognitive and physical deficits and behaviors that affect mobility  - Identify mobility aids required to assist with transfers and/or ambulation (gait belt, sit-to-stand, lift, walker, cane, etc )  - [place] fall precautions as indicated by assessment  - Record patient progress and toleration of activity level on Mobility SBAR; progress patient to next Phase/Stage  - Instruct patient to call for assistance with activity based on assessment  - Consider rehabilitation consult to assist with strengthening/weightbearing, etc   Outcome: Progressing     Problem: Knowledge Deficit  Goal: Patient/family/caregiver demonstrates understanding of disease process, treatment plan, medications, and discharge instructions  Description: Complete learning assessment and assess knowledge base    Interventions:  - Provide teaching at level of understanding  - Provide teaching via preferred learning methods  Outcome: Progressing     Problem: DISCHARGE PLANNING  Goal: Discharge to home or other facility with appropriate resources  Description: INTERVENTIONS:  - Identify barriers to discharge w/patient and caregiver  - Arrange for needed discharge resources and transportation as appropriate  - Identify discharge learning needs (meds, wound care, etc )  - Arrange for interpretive services to assist at discharge as needed  - Refer to Case Management Department for coordinating discharge planning if the patient needs post-hospital services based on physician/advanced practitioner order or complex needs related to functional status, cognitive ability, or social support system  Outcome: Progressing

## 2021-02-20 NOTE — DISCHARGE SUMMARY
Discharge Summary - OB/GYN   Gera Skaggs 26 y o  female MRN: [id number]  Unit/Bed#: L&D 312-01 Encounter: [id number]      Admission Date: 2021     Discharge Date: 21    Admitting Diagnosis:   1  Pregnancy at 38w0d  2  Di-Di Twins   3  H/o Prior  Section    Discharge Diagnosis:   Same, delivered      Procedures: primary  section, low transverse incision    Delivery Attending: Suzen Buerger, MD  Discharge Attending: Dr Tracie Cloud Course:     Gera Skaggs is a 26 y o  Bettye Sober at 38w0d wks who was initially admitted for RLTCS for di-di twins  She delivered a viable male  on  at [phone]  Weight 7lbs 7 8oz via primary  section, low transverse incision  Apgars were 9 (1 min) and 9 (5 min)   was transferred to  nursery  She delivered a viable female  on  at 0819  Weight 8lbs 1oz via primary  section, low transverse incision  Apgars were 9 (1 min) and 9 (5 min)   was transferred to  nursery  Patient tolerated the procedure well and was transferred to recovery in stable condition  Her post-operative course was complicated by gestational hypertension  Preoperative hemoglobin was 11 5, postoperative was 10 1  Her postoperative pain was well controlled with oral analgesics  On day of discharge, she was ambulating and able to reasonably perform all ADLs  She was voiding and had appropriate bowel function  Pain was well controlled  She was discharged home on post-operative day #2 without complications  Patient was instructed to follow up with her OB as an outpatient and was given appropriate warnings to call provider if she develops signs of infection or uncontrolled pain  Complications: none apparent    Condition at discharge: good     Discharge instructions/Information to patient and family:   See after visit summary for information provided to patient and family        Provisions for Follow-Up Care:  See after visit summary for information related to follow-up care and any pertinent home health orders  Disposition: Home    Planned Readmission: No    Discharge Medications: For a complete list of the patient's medications, please refer to her med rec          Kelsey Butler MD   OB/GYN PGY-1  9:18 AM  2/20/2021

## 2021-02-20 NOTE — PROGRESS NOTES
Progress Note - OB/GYN  Kadie Leon 26 y o  female MRN: [id number]  Unit/Bed#: L&D 312-01 Encounter: [id number]      Kadie Leon is a patient of [facility]    Subjective/Objective     Chief Complaint: Postoperative State     Subjective:  Patient is s/p RLTCS for di-di twins  She is POD# 2  Her pain is well controlled  Her incision is C/D/I  She is recovering well and is stable  Patient met criteria for gHTN  Denies headache, vision change, chest pain, SOB and RUQ pain       Pain: no  Tolerating Oral Intake: yes  Voiding: yes  Flatus: yes  Bowel Movement: no  Ambulating: yes  Breastfeeding: Breastfeeding  Chest Pain: no  Shortness of Breath: no  Leg Pain/Discomfort: no  Lochia: minimal    Vitals:   /84 (BP Location: Left arm)   Pulse 72   Temp 98 °F (36 7 °C) (Temporal)   Resp 18   Ht 5' 4" (1 626 m)   Wt 127 kg (281 lb)   LMP 05/20/2020   SpO2 98%   Breastfeeding Yes   BMI 48 23 kg/m²       Intake/Output Summary (Last 24 hours) at 2/20/2021 0830  Last data filed at 2/20/2021 0225  Gross per 24 hour   Intake --   Output 2000 ml   Net -2000 ml       Invasive Devices     None                 Physical Exam:   GEN: Kadie Leon appears well, alert and oriented x 3, pleasant and cooperative   CARDIO: RRR, no murmurs or rubs  RESP:  CTAB, no wheezes or rales  ABDOMEN: soft, no tenderness, no distention, fundus U+1, Incision C/D/I  EXTREMITIES: SCDs on, Negative Herminio's sign bilaterally      Labs:   Admission on 02/18/2021   Component Date Value    WBC 02/18/2021 7 49     RBC 02/18/2021 3 86     Hemoglobin 02/18/2021 11 5     Hematocrit 02/18/2021 35 8     MCV 02/18/2021 93     MCH 02/18/2021 29 8     MCHC 02/18/2021 32 1     RDW 02/18/2021 13 7     Platelets 74/97/4657 150     MPV 02/18/2021 10 8     RPR 02/18/2021 Non-Reactive     ABO Grouping 02/18/2021 AB     Rh Factor 02/18/2021 Positive     Antibody Screen 02/18/2021 Negative     Specimen Expiration Date 02/18/2021 65001745     pH, Cord Nigel 2021 7  347     pCO2, Cord Nigel 2021 43 5*    pO2, Cord Nigel 2021 34 9     HCO3, Cord Nigel 2021 23 3    [facility] Exc, Cord Nigel 2021 -2 4*    O2 Cont, Cord Nigel 2021 17 1     O2 HGB,VENOUS CORD 2021 79 2     pH, Cord Art 2021 7  286     pCO2, Cord Art 2021 53 6     pO2, Cord Art 2021 15 9     HCO3, Cord Art 2021 25 0    [facility] Exc, Cord Art 2021 -2 5*    O2 Content, Cord Art 2021 6 2     O2 Hgb, Arterial Cord 2021 29 8     pH, Cord Nigel 2021 7 312     pCO2, Cord Nigel 2021 52 3*    pO2, Cord Nigel 2021 19 2     HCO3, Cord Nigel 2021 25 9    [facility] Exc, Cord Nigel 2021 -1 2*    O2 Cont, Cord Nigel 2021 9 3     O2 HGB,VENOUS CORD 2021 42 4     pH, Cord Art 2021 7  296     pCO2, Cord Art 2021 50 9     pO2, Cord Art 2021 17 2     HCO3, Cord Art 2021 24 3     Base Exc, Cord Art 2021 -2 8*    O2 Content, Cord Art 2021 7 3     O2 Hgb, Arterial Cord 2021 34 5     WBC 2021 8 51     RBC 2021 3 37*    Hemoglobin 2021 10 1*    Hematocrit 2021 31 2*    MCV 2021 93     MCH 2021 30 0     MCHC 2021 32 4     RDW 2021 13 7     Platelets  132*    MPV 2021 11 0          Patient Active Problem List   Diagnosis    BMI 39 0-39 9,adult    History of  delivery    Dichorionic diamniotic twin pregnancy in third trimester    Maternal obesity, antepartum, third trimester    Request for sterilization    38 weeks gestation of pregnancy    S/P  section        Assessment and Plan     Flavia Escobar is POD# 2 s/p RLTCS   She is recovering well and is stable     POD# 2   - Preop Hgb 11 5 --> post op Hgb 10 1   - VT passed    -  On Lovenox 40mg BID for DVT prophylaxis    - Continue current medications for pain management    - Encourage ambulation    - Encourage breast feeding     gHTN    - Denies sxs of preeclampsia    - consider CBC/CMP     Disposition    - Anticipate discharge home on POD# Kaylynn Barnes MD  2/20/2021  8:30 AM

## 2021-02-20 NOTE — PROGRESS NOTES
Discharge teaching done with pt  Verbalized understanding, appropriate questions asked  Save your life magnet given to pt   Reviewed magnet with pt and her spouse

## 2021-02-20 NOTE — PLAN OF CARE
Problem: Potential for Falls  Goal: Patient will remain free of falls  Description: INTERVENTIONS:  - Assess patient frequently for physical needs  -  Identify cognitive and physical deficits and behaviors that affect risk of falls    -  [place] fall precautions as indicated by assessment   - Educate patient/family on patient safety including physical limitations  - Instruct patient to call for assistance with activity based on assessment  - Modify environment to reduce risk of injury  - Consider OT/PT consult to assist with strengthening/mobility  Outcome: Progressing     Problem: POSTPARTUM  Goal: Experiences normal postpartum course  Description: INTERVENTIONS:  - Monitor maternal vital signs  - Assess uterine involution and lochia  Outcome: Progressing  Goal: Appropriate maternal -  bonding  Description: INTERVENTIONS:  - Identify family support  - Assess for appropriate maternal/infant bonding   -Encourage maternal/infant bonding opportunities  - Referral to  or  as needed  Outcome: Progressing  Goal: Establishment of infant feeding pattern  Description: INTERVENTIONS:  - Assess breast/bottle feeding  - Refer to lactation as needed  Outcome: Progressing  Goal: Incision(s), wounds(s) or drain site(s) healing without S/S of infection  Description: INTERVENTIONS  - Assess and document risk factors for skin impairment   - Assess and document dressing, incision, wound bed, drain sites and surrounding tissue  - Consider nutrition services referral as needed  - Oral mucous membranes remain intact  - Provide patient/ family education  Outcome: Progressing     Problem: PAIN - ADULT  Goal: Verbalizes/displays adequate comfort level or baseline comfort level  Description: Interventions:  - Encourage patient to monitor pain and request assistance  - Assess pain using appropriate pain scale  - Administer analgesics based on type and severity of pain and evaluate response  - Implement non-pharmacological measures as appropriate and evaluate response  - Consider cultural and social influences on pain and pain management  - Notify physician/advanced practitioner if interventions unsuccessful or patient reports new pain  Outcome: Progressing     Problem: INFECTION - ADULT  Goal: Absence or prevention of progression during hospitalization  Description: INTERVENTIONS:  - Assess and monitor for signs and symptoms of infection  - Monitor lab/diagnostic results  - Monitor all insertion sites, i e  indwelling lines, tubes, and drains  - Monitor endotracheal if appropriate and nasal secretions for changes in amount and color  - [place] appropriate cooling/warming therapies per order  - Administer medications as ordered  - Instruct and encourage patient and family to use good hand hygiene technique  - Identify and instruct in appropriate isolation precautions for identified infection/condition  Outcome: Progressing  Goal: Absence of fever/infection during neutropenic period  Description: INTERVENTIONS:  - Monitor WBC    Outcome: Progressing     Problem: SAFETY ADULT  Goal: Patient will remain free of falls  Description: INTERVENTIONS:  - Assess patient frequently for physical needs  -  Identify cognitive and physical deficits and behaviors that affect risk of falls    -  [place] fall precautions as indicated by assessment   - Educate patient/family on patient safety including physical limitations  - Instruct patient to call for assistance with activity based on assessment  - Modify environment to reduce risk of injury  - Consider OT/PT consult to assist with strengthening/mobility  Outcome: Progressing  Goal: Maintain or return to baseline ADL function  Description: INTERVENTIONS:  -  Assess patient's ability to carry out ADLs; assess patient's baseline for ADL function and identify physical deficits which impact ability to perform ADLs (bathing, care of mouth/teeth, toileting, grooming, dressing, etc )  - Assess/evaluate cause of self-care deficits   - Assess range of motion  - Assess patient's mobility; develop plan if impaired  - Assess patient's need for assistive devices and provide as appropriate  - Encourage maximum independence but intervene and supervise when necessary  - Involve family in performance of ADLs  - Assess for home care needs following discharge   - Consider OT consult to assist with ADL evaluation and planning for discharge  - Provide patient education as appropriate  Outcome: Progressing  Goal: Maintain or return mobility status to optimal level  Description: INTERVENTIONS:  - Assess patient's baseline mobility status (ambulation, transfers, stairs, etc )    - Identify cognitive and physical deficits and behaviors that affect mobility  - Identify mobility aids required to assist with transfers and/or ambulation (gait belt, sit-to-stand, lift, walker, cane, etc )  - [place] fall precautions as indicated by assessment  - Record patient progress and toleration of activity level on Mobility SBAR; progress patient to next Phase/Stage  - Instruct patient to call for assistance with activity based on assessment  - Consider rehabilitation consult to assist with strengthening/weightbearing, etc   Outcome: Progressing     Problem: Knowledge Deficit  Goal: Patient/family/caregiver demonstrates understanding of disease process, treatment plan, medications, and discharge instructions  Description: Complete learning assessment and assess knowledge base    Interventions:  - Provide teaching at level of understanding  - Provide teaching via preferred learning methods  Outcome: Progressing     Problem: DISCHARGE PLANNING  Goal: Discharge to home or other facility with appropriate resources  Description: INTERVENTIONS:  - Identify barriers to discharge w/patient and caregiver  - Arrange for needed discharge resources and transportation as appropriate  - Identify discharge learning needs (meds, wound care, etc )  - Arrange for interpretive services to assist at discharge as needed  - Refer to Case Management Department for coordinating discharge planning if the patient needs post-hospital services based on physician/advanced practitioner order or complex needs related to functional status, cognitive ability, or social support system  Outcome: Progressing

## 2021-02-21 ENCOUNTER — TELEPHONE (OUTPATIENT)
Dept: OTHER | Facility: OTHER | Age: 31
End: 2021-02-21

## 2021-02-21 NOTE — PROGRESS NOTES
Patient call with complaints of pacing a large blood clot after nursing her baby  Was discharged  yesterday had elective repeat  section for twin gestation  She was otherwise doing fine  She will take some Motrin for the cramps and bleeding  Will call if bleeding gets worse  Will call the office tomorrow for follow-up care    Answered

## 2021-03-01 ENCOUNTER — OFFICE VISIT (OUTPATIENT)
Dept: OBGYN CLINIC | Facility: CLINIC | Age: 31
End: 2021-03-01

## 2021-03-01 VITALS
BODY MASS INDEX: 41.83 KG/M2 | SYSTOLIC BLOOD PRESSURE: 122 MMHG | HEIGHT: 64 IN | DIASTOLIC BLOOD PRESSURE: 72 MMHG | WEIGHT: 245 LBS

## 2021-03-01 DIAGNOSIS — Z98.891 S/P CESAREAN SECTION: Primary | ICD-10-CM

## 2021-03-01 PROCEDURE — 99024 POSTOP FOLLOW-UP VISIT: CPT | Performed by: OBSTETRICS & GYNECOLOGY

## 2021-03-01 NOTE — PROGRESS NOTES
Brittney Castanon is a 26 y o   female who presents to the office 1 weeks status post repeat  for twin gestation/ prior  section   Eating a regular diet without difficulty  Bowel movements are normal  The patient is not having any pain  The following portions of the patient's history were reviewed and updated as appropriate: allergies, current medications, past family history, past medical history, past social history, past surgical history and problem list     Review of Systems  Pertinent items are noted in HPI  Objective  /72   Ht 5' 4" (1 626 m)   Wt 111 kg (245 lb)   BMI 42 05 kg/m²     General:  alert and oriented, in no acute distress   Abdomen: soft, bowel sounds active, non-tender   Incision:   healing well, no drainage, no erythema, no hernia, no seroma, no swelling, no dehiscence, incision well approximated         Assessment      Doing well postoperatively  Operative findings again reviewed  Pathology report discussed  Plan     1  Continue any current medications  2  Wound care discussed  3  Activity restrictions: no gym class and no sports  4  Anticipated return to work: not applicable    5  Follow up: 5 weeks

## 2021-04-15 ENCOUNTER — OFFICE VISIT (OUTPATIENT)
Dept: OBGYN CLINIC | Facility: MEDICAL CENTER | Age: 31
End: 2021-04-15

## 2021-04-15 VITALS — DIASTOLIC BLOOD PRESSURE: 75 MMHG | WEIGHT: 236.2 LBS | BODY MASS INDEX: 40.54 KG/M2 | SYSTOLIC BLOOD PRESSURE: 120 MMHG

## 2021-04-15 PROCEDURE — 99024 POSTOP FOLLOW-UP VISIT: CPT | Performed by: OBSTETRICS & GYNECOLOGY

## 2021-04-15 NOTE — PROGRESS NOTES
OB POSTPARTUM VISIT PROGRESS NOTE  Date of Encounter: 4/15/2021    Lacy Lucero    : 1990  (26 y o )  MR: [id number]    Brittney Luther is in for her postpartum visit  She is now   She delivered by repeat  section and tubal ligation was performed  She's generally doing well, denies current pain or bleeding issues, and has no significant depression issues  She is breast feeding exclusively  We discussed all appropriate contraceptive options and she chooses None  Objective   EXAM:  GENERAL: alert, well appearing, and in no distress  VITALS: Blood pressure 120/75, weight 107 kg (236 lb 3 2 oz), currently breastfeeding  BMI: Body mass index is 40 54 kg/m²      LUNGS: unlabored breathing    ABDOMEN: Regular/ incision well healed   EXTREMITIES: All normal   PELVIC:deferred    Assessment/Plan   Diagnoses and all orders for this visit:    6 weeks postpartum follow-up      Exclusively breastfeeding twins  S/p repeat section with tubal ligation/ doing well  Follow up for yearly visit in December    Yamilex Carolina MD

## 2021-06-09 ENCOUNTER — OFFICE VISIT (OUTPATIENT)
Dept: DERMATOLOGY | Age: 31
End: 2021-06-09
Payer: COMMERCIAL

## 2021-06-09 VITALS — HEIGHT: 64 IN | WEIGHT: 226.2 LBS | TEMPERATURE: 97.2 F | BODY MASS INDEX: 38.62 KG/M2

## 2021-06-09 DIAGNOSIS — D22.9 MULTIPLE NEVI: Primary | ICD-10-CM

## 2021-06-09 DIAGNOSIS — D18.01 CHERRY ANGIOMA: ICD-10-CM

## 2021-06-09 DIAGNOSIS — L82.1 SEBORRHEIC KERATOSIS: ICD-10-CM

## 2021-06-09 PROCEDURE — 99203 OFFICE O/P NEW LOW 30 MIN: CPT | Performed by: DERMATOLOGY

## 2021-06-09 NOTE — PROGRESS NOTES
[facility] Note     Patient Name: Lisette Brumfield  Encounter Date: 6/9/2021     Have you been cared for by a [facility] Dermatologist in the last 3 years and, if so, which one? No    · Have you traveled outside of the [address] [place] [address] in the past 3 months or outside of the [facility] area in the last 2 weeks? No     May we call your Preferred Phone number to discuss your specific medical information? Yes     May we leave a detailed message that includes your specific medical information? Yes      Today's Chief Concerns:   Concern #1:  Spot of concern    Past Medical History:  Have you personally ever had or currently have any of the following? · Skin cancer (such as Melanoma, Basal Cell Carcinoma, Squamous Cell Carcinoma? (If Yes, please provide more detail)- No  · Eczema: No  · Psoriasis: No  · HIV/AIDS: No  · Hepatitis B or C: No  · Tuberculosis: No  · Systemic Immunosuppression such as Diabetes, Biologic or Immunotherapy, Chemotherapy, Organ Transplantation, Bone Marrow Transplantation (If YES, please provide more detail): No  · Radiation Treatment (If YES, please provide more detail): No  · Any other major medical conditions/concerns? (If Yes, which types)- No    Social History:     What is/was your primary occupation? Unemployed      What are your hobbies/past-times? Walking     Family History:  Have any of your "first degree relatives" (parent, brother, sister, or child) had any of the following       · Skin cancer such as Melanoma or Merkel Cell Carcinoma or Pancreatic Cancer? No  · Eczema, Asthma, Hay Fever or Seasonal Allergies: YES, daughter/son: eczema  · Psoriasis or Psoriatic Arthritis: No  · Do any other medical conditions seem to run in your family? If Yes, what condition and which relatives?   No    Current Medications:     Current Outpatient Medications:     LINOLEIC ACID-SUNFLOWER OIL PO, Take by mouth, Disp: , Rfl:     Prenatal Vit-Fe Fumarate-FA (PRENATAL VITAMIN PO), Take by mouth, Disp: , Rfl:     acetaminophen (TYLENOL) 325 mg tablet, Take 2 tablets (650 mg total) by mouth every 4 (four) hours as needed for mild pain (Patient not taking: Reported on 4/15/2021), Disp:  , Rfl: 0    ferrous sulfate 324 (65 Fe) mg, Take 1 tablet (324 mg total) by mouth daily at bedtime Take on empty stomach or with vit C  Avoid milk, calcium, reflux meds, thyroid meds  (Patient not taking: Reported on 4/15/2021), Disp: 30 tablet, Rfl: 6    ibuprofen (MOTRIN) 600 mg tablet, Take 1 tablet (600 mg total) by mouth every 6 (six) hours for 28 days, Disp: 120 tablet, Rfl: 0      Review of Systems:  Have you recently had or currently have any of the following? If YES, what are you doing for the problem? · Fever, chills or unintended weight loss: YES, covid vaccine  · Sudden loss or change in your vision: No  · Nausea, vomiting or blood in your stool: No  · Painful or swollen joints: No  · Wheezing or cough: No  · Changing mole or non-healing wound: YES, changing mole  · Nosebleeds: No  · Excessive sweating: No  · Easy or prolonged bleeding? No  · Over the last 2 weeks, how often have you been bothered by the following problems? · Taking little interest or pleasure in doing things: 1 - Not at All  · Feeling down, depressed, or hopeless: 1 - Not at All  · Rapid heartbeat with epinephrine:  No    · FEMALES ONLY:    · Are you pregnant or planning to become pregnant? No  · Are you currently or planning to be nursing or breast feeding? YES    · Any known allergies? · No Known Allergies      Physical Exam:     Was a chaperone (Derm Clinical Assistant) present throughout the entire Physical Exam? Yes     Did the Dermatology Team specifically  the patient on the importance of a Full Skin Exam to be sure that nothing is missed clinically?  Yes}  o Did the patient ultimately request or accept a Full Skin Exam?  Yes  o Did the patient specifically refuse to have the areas "under-the-bra" examined by the Dermatologist? No  o Did the patient specifically refuse to have the areas "under-the-underwear" examined by the Dermatologist? No    CONSTITUTIONAL:   Vitals:    06/09/21 1611   Temp: (!) 97 2 °F (36 2 °C)   TempSrc: Tympanic   Weight: 103 kg (226 lb 3 2 oz)   Height: 5' 4" (1 626 m)       PSYCH: Normal mood and affect  EYES: Normal conjunctiva  ENT: Normal lips and oral mucosa  CARDIOVASCULAR: No edema  RESPIRATORY: Normal respirations  HEME/LYMPH/IMMUNO:  No regional lymphadenopathy except as noted below in "ASSESSMENT AND PLAN BY DIAGNOSIS"    SKIN:  FULL ORGAN SYSTEM EXAM   Hair, Scalp, Ears, Face Normal except as noted below in Assessment   Neck Normal except as noted below in Assessment   Right Arm/Hand/Fingers Normal except as noted below in Assessment   Left Arm/Hand/Fingers Normal except as noted below in Assessment   Chest/Breasts/Axillae Viewed areas Normal except as noted below in Assessment   Abdomen, Umbilicus Normal except as noted below in Assessment   Back/Spine Normal except as noted below in Assessment   Groin/Genitalia/Buttocks Normal except as noted below in Assessment   Right Leg, Foot, Toes Normal except as noted below in Assessment   Left Leg, Foot, Toes Normal except as noted below in Assessment        Assessment and Plan by Diagnosis:    History of Present Condition:     Duration:  How long has this been an issue for you? o  4 months   Location Affected:  Where on the body is this affecting you?    o  right breast   Quality:  Is there any bleeding, pain, itch, burning/irritation, or redness associated with the skin lesion? o  denies   Severity:  Describe any bleeding, pain, itch, burning/irritation, or redness on a scale of 1 to 10 (with 10 being the worst)  o  1   Timing:  Does this condition seem to be there pretty constantly or do you notice it more at specific times throughout the day?     o  constant   Context:  Have you ever noticed that this condition seems to be associated with specific activities you do?    o  denies   Modifying Factors:    o Anything that seems to make the condition worse?    -  denies  o What have you tried to do to make the condition better?    -  denies   Associated Signs and Symptoms:  Does this skin lesion seem to be associated with any of the following:  o Denies    1  MELANOCYTIC NEVI ("Moles")    Physical Exam:   Anatomic Location Affected:   Mostly on sun-exposed areas of the trunk and extremities   Morphological Description:  Scattered, 1-4mm round to ovoid, symmetrical-appearing, even bordered, skin colored to dark brown macules/papules, mostly in sun-exposed areas   Pertinent Positives:   Pertinent Negatives:     Assessment and Plan:  Based on a thorough discussion of this condition and the management approach to it (including a comprehensive discussion of the known risks, side effects and potential benefits of treatment), the patient (family) agrees to implement the following specific plan:   When outside we recommend using a wide brim hat, sunglasses, long sleeve and pants, sunscreen with SPF 05+ with reapplication every 2 hours, or SPF specific clothing    Benign, reassured   2-5 year skin checks     Melanocytic Nevi  Melanocytic nevi ("moles") are tan or brown, raised or flat areas of the skin which have an increased number of melanocytes  Melanocytes are the cells in our body which make pigment and account for skin color  Some moles are present at birth (I e , "congenital nevi"), while others come up later in life (i e , "acquired nevi")  The sun can stimulate the body to make more moles  Sunburns are not the only thing that triggers more moles  Chronic sun exposure can do it too  Clinically distinguishing a healthy mole from melanoma may be difficult, even for experienced dermatologists   The "ABCDE's" of moles have been suggested as a means of helping to alert a person to a suspicious mole and the possible increased risk of melanoma  The suggestions for raising alert are as follows:    Asymmetry: Healthy moles tend to be symmetric, while melanomas are often asymmetric  Asymmetry means if you draw a line through the mole, the two halves do not match in color, size, shape, or surface texture  Asymmetry can be a result of rapid enlargement of a mole, the development of a raised area on a previously flat lesion, scaling, ulceration, bleeding or scabbing within the mole  Any mole that starts to demonstrate "asymmetry" should be examined promptly by a board certified dermatologist      Border: Healthy moles tend to have discrete, even borders  The border of a melanoma often blends into the normal skin and does not sharply delineate the mole from normal skin  Any mole that starts to demonstrate "uneven borders" should be examined promptly by a board certified dermatologist      Color: Healthy moles tend to be one color throughout  Melanomas tend to be made up of different colors ranging from dark black, blue, white, or red  Any mole that demonstrates a color change should be examined promptly by a board certified dermatologist      Diameter: Healthy moles tend to be smaller than 0 6 cm in size; an exception are "congenital nevi" that can be larger  Melanomas tend to grow and can often be greater than 0 6 cm (1/4 of an inch, or the size of a pencil eraser)  This is only a guideline, and many normal moles may be larger than 0 6 cm without being unhealthy  Any mole that starts to change in size (small to bigger or bigger to smaller) should be examined promptly by a board certified dermatologist      Evolving: Healthy moles tend to "stay the same "  Melanomas may often show signs of change or evolution such as a change in size, shape, color, or elevation    Any mole that starts to itch, bleed, crust, burn, hurt, or ulcerate or demonstrate a change or evolution should be examined promptly by a board certified dermatologist       2  MAXWELL ANGIOMAS    Physical Exam:   Anatomic Location Affected:  Trunk, arms   Morphological Description:  Scattered cherry red, 1-4 mm papules   Pertinent Positives:   Pertinent Negatives:    Assessment and Plan:  Based on a thorough discussion of this condition and the management approach to it (including a comprehensive discussion of the known risks, side effects and potential benefits of treatment), the patient (family) agrees to implement the following specific plan:   Monitor for changes   Benign, reassured    Assessment and Plan:    Cherry angioma, also known as [employer] spots, are benign vascular skin lesions  A "cherry angioma" is a firm red, blue or purple papule, 0 1-1 cm in diameter  When thrombosed, they can appear black in colour until evaluated with a dermatoscope when the red or purple colour is more easily seen  Cherry angioma may develop on any part of the body but most often appear on the scalp, face, lips and trunk  An angioma is due to proliferating endothelial cells; these are the cells that line the inside of a blood vessel  Angiomas can arise in early life or later in life; the most common type of angioma is a cherry angioma  Cherry angiomas are very common in males and females of any age or race  They are more noticeable in white skin than in skin of colour  They markedly increase in number from about the age of 36  There may be a family history of similar lesions  Eruptive cherry angiomas have been rarely reported to be associated with internal malignancy  The cause of angiomas is unknown  Genetic analysis of cherry angiomas has shown that they frequently carry specific somatic missense mutations in the GNAQ and GNA11 (Q209H) genes, which are involved in other vascular and melanocytic proliferations      3  SEBORRHEIC KERATOSIS; NON-INFLAMED    Physical Exam:   Anatomic Location Affected:  trunk   Morphological Description: Flat and raised, waxy, smooth to warty textured, yellow to brownish-grey to dark brown to blackish, discrete, "stuck-on" appearing papules   Pertinent Positives:   Pertinent Negatives: Additional History of Present Condition: see derm note above  Assessment and Plan:  Based on a thorough discussion of this condition and the management approach to it (including a comprehensive discussion of the known risks, side effects and potential benefits of treatment), the patient (family) agrees to implement the following specific plan:   Monitor for changes   Benign, reassured    Seborrheic Keratosis  A seborrheic keratosis is a harmless warty spot that appears during adult life as a common sign of skin aging  Seborrheic keratoses can arise on any area of skin, covered or uncovered, with the usual exception of the palms and soles  They do not arise from mucous membranes  Seborrheic keratoses can have highly variable appearance  Seborrheic keratoses are extremely common  It has been estimated that over 90% of adults over the age of 61 years have one or more of them  They occur in males and females of all races, typically beginning to erupt in the 35s or 45s  They are uncommon under the age of 21 years  The precise cause of seborrhoeic keratoses is not known  Seborrhoeic keratoses are considered degenerative in nature  As time goes by, seborrheic keratoses tend to become more numerous  Some people inherit a tendency to develop a very large number of them; some people may have hundreds of them  There is no easy way to remove multiple lesions on a single occasion  Unless a specific lesion is "inflamed" and is causing pain or stinging/burning or is bleeding, most insurance companies do not authorize treatment      Scribe Attestation    I,:  Martina Harris am acting as a scribe while in the presence of the attending physician :       I,:  Miah Dean MD personally performed the services described in this documentation    as scribed in my presence :

## 2021-06-09 NOTE — PATIENT INSTRUCTIONS
1  MELANOCYTIC NEVI ("Moles")    Assessment and Plan:  Based on a thorough discussion of this condition and the management approach to it (including a comprehensive discussion of the known risks, side effects and potential benefits of treatment), the patient (family) agrees to implement the following specific plan:   When outside we recommend using a wide brim hat, sunglasses, long sleeve and pants, sunscreen with SPF 84+ with reapplication every 2 hours, or SPF specific clothing    Benign, reassured   2-5 year skin checks     Melanocytic Nevi  Melanocytic nevi ("moles") are tan or brown, raised or flat areas of the skin which have an increased number of melanocytes  Melanocytes are the cells in our body which make pigment and account for skin color  Some moles are present at birth (I e , "congenital nevi"), while others come up later in life (i e , "acquired nevi")  The sun can stimulate the body to make more moles  Sunburns are not the only thing that triggers more moles  Chronic sun exposure can do it too  Clinically distinguishing a healthy mole from melanoma may be difficult, even for experienced dermatologists  The "ABCDE's" of moles have been suggested as a means of helping to alert a person to a suspicious mole and the possible increased risk of melanoma  The suggestions for raising alert are as follows:    Asymmetry: Healthy moles tend to be symmetric, while melanomas are often asymmetric  Asymmetry means if you draw a line through the mole, the two halves do not match in color, size, shape, or surface texture  Asymmetry can be a result of rapid enlargement of a mole, the development of a raised area on a previously flat lesion, scaling, ulceration, bleeding or scabbing within the mole  Any mole that starts to demonstrate "asymmetry" should be examined promptly by a board certified dermatologist      Border: Healthy moles tend to have discrete, even borders    The border of a melanoma often blends into the normal skin and does not sharply delineate the mole from normal skin  Any mole that starts to demonstrate "uneven borders" should be examined promptly by a board certified dermatologist      Color: Healthy moles tend to be one color throughout  Melanomas tend to be made up of different colors ranging from dark black, blue, white, or red  Any mole that demonstrates a color change should be examined promptly by a board certified dermatologist      Diameter: Healthy moles tend to be smaller than 0 6 cm in size; an exception are "congenital nevi" that can be larger  Melanomas tend to grow and can often be greater than 0 6 cm (1/4 of an inch, or the size of a pencil eraser)  This is only a guideline, and many normal moles may be larger than 0 6 cm without being unhealthy  Any mole that starts to change in size (small to bigger or bigger to smaller) should be examined promptly by a board certified dermatologist      Evolving: Healthy moles tend to "stay the same "  Melanomas may often show signs of change or evolution such as a change in size, shape, color, or elevation  Any mole that starts to itch, bleed, crust, burn, hurt, or ulcerate or demonstrate a change or evolution should be examined promptly by a board certified dermatologist       2  MAXWELL ANGIOMAS  Assessment and Plan:  Based on a thorough discussion of this condition and the management approach to it (including a comprehensive discussion of the known risks, side effects and potential benefits of treatment), the patient (family) agrees to implement the following specific plan:   Monitor for changes   Benign, reassured    Assessment and Plan:    Cherry angioma, also known as [employer] spots, are benign vascular skin lesions  A "cherry angioma" is a firm red, blue or purple papule, 0 1-1 cm in diameter   When thrombosed, they can appear black in colour until evaluated with a dermatoscope when the red or purple colour is more easily seen  Cherry angioma may develop on any part of the body but most often appear on the scalp, face, lips and trunk  An angioma is due to proliferating endothelial cells; these are the cells that line the inside of a blood vessel  Angiomas can arise in early life or later in life; the most common type of angioma is a cherry angioma  Cherry angiomas are very common in males and females of any age or race  They are more noticeable in white skin than in skin of colour  They markedly increase in number from about the age of 36  There may be a family history of similar lesions  Eruptive cherry angiomas have been rarely reported to be associated with internal malignancy  The cause of angiomas is unknown  Genetic analysis of cherry angiomas has shown that they frequently carry specific somatic missense mutations in the GNAQ and GNA11 (Q209H) genes, which are involved in other vascular and melanocytic proliferations  3  SEBORRHEIC KERATOSIS; NON-INFLAMED  Assessment and Plan:  Based on a thorough discussion of this condition and the management approach to it (including a comprehensive discussion of the known risks, side effects and potential benefits of treatment), the patient (family) agrees to implement the following specific plan:   Monitor for changes   Benign, reassured    Seborrheic Keratosis  A seborrheic keratosis is a harmless warty spot that appears during adult life as a common sign of skin aging  Seborrheic keratoses can arise on any area of skin, covered or uncovered, with the usual exception of the palms and soles  They do not arise from mucous membranes  Seborrheic keratoses can have highly variable appearance  Seborrheic keratoses are extremely common  It has been estimated that over 90% of adults over the age of 61 years have one or more of them  They occur in males and females of all races, typically beginning to erupt in the 35s or 45s  They are uncommon under the age of 21 years  The precise cause of seborrhoeic keratoses is not known  Seborrhoeic keratoses are considered degenerative in nature  As time goes by, seborrheic keratoses tend to become more numerous  Some people inherit a tendency to develop a very large number of them; some people may have hundreds of them  There is no easy way to remove multiple lesions on a single occasion  Unless a specific lesion is "inflamed" and is causing pain or stinging/burning or is bleeding, most insurance companies do not authorize treatment

## 2022-09-30 ENCOUNTER — TELEPHONE (OUTPATIENT)
Dept: DERMATOLOGY | Age: 32
End: 2022-09-30

## 2022-09-30 NOTE — TELEPHONE ENCOUNTER
Magen no from patient regarding a question   Call back number is [phone]  Returned call she was concerned about a growth on buttock and was not sure if it  Was a dermatology issue  Did not want to see same provider as before due to the location   [address] and okay with male provider    appt scheduled for 10/20 [place]
Cerebral Infarct

## 2023-11-20 ENCOUNTER — OFFICE VISIT (OUTPATIENT)
Dept: OBGYN CLINIC | Facility: MEDICAL CENTER | Age: 33
End: 2023-11-20
Payer: COMMERCIAL

## 2023-11-20 VITALS — HEIGHT: 64 IN | WEIGHT: 291.4 LBS | BODY MASS INDEX: 49.75 KG/M2

## 2023-11-20 DIAGNOSIS — Z12.4 ENCOUNTER FOR SCREENING FOR CERVICAL CANCER: Primary | ICD-10-CM

## 2023-11-20 PROCEDURE — G0145 SCR C/V CYTO,THINLAYER,RESCR: HCPCS | Performed by: OBSTETRICS & GYNECOLOGY

## 2023-11-20 PROCEDURE — G0476 HPV COMBO ASSAY CA SCREEN: HCPCS | Performed by: OBSTETRICS & GYNECOLOGY

## 2023-11-20 PROCEDURE — S0612 ANNUAL GYNECOLOGICAL EXAMINA: HCPCS | Performed by: OBSTETRICS & GYNECOLOGY

## 2023-11-20 NOTE — PROGRESS NOTES
A/P    1. Annual exam    Last PAP- 12/8/2016-neg   Next due today with co testing    Scheduling of pap discussed in detail          26 y.o.,No LMP recorded (lmp unknown). C/O no gyn concerns  Asked about - extra skin   Gets wet and a bit red at times       Past medical / social / surgical / family history reviewed and updated   Medication and allergies discussed in detail and updated     Review of Systems - History obtained from chart review and the patient  General ROS: negative  Psychological ROS: negative  Ophthalmic ROS: negative  ENT ROS: negative  Allergy and Immunology ROS: negative  Hematological and Lymphatic ROS: negative  Endocrine ROS: negative  Breast ROS: negative for breast lumps  Respiratory ROS: no cough, shortness of breath, or wheezing  Cardiovascular ROS: no chest pain or dyspnea on exertion  Gastrointestinal ROS: no abdominal pain, change in bowel habits, or black or bloody stools  Genito-Urinary ROS: no dysuria, trouble voiding, or hematuria  Musculoskeletal ROS: negative  Neurological ROS: no TIA or stroke symptoms  Dermatological ROS: negative        Physical Exam  Vitals reviewed. Exam conducted with a chaperone present. Constitutional:       Appearance: She is well-developed. Neck:      Thyroid: No thyromegaly. Cardiovascular:      Rate and Rhythm: Normal rate. Heart sounds: Normal heart sounds. Pulmonary:      Effort: Pulmonary effort is normal. No accessory muscle usage or respiratory distress. Breath sounds: Normal air entry. Chest:      Chest wall: No tenderness. Breasts:     Breasts are symmetrical.      Right: No inverted nipple, mass or tenderness. Left: No inverted nipple, mass or tenderness. Abdominal:      General: There is no distension. Palpations: Abdomen is soft. Tenderness: There is no abdominal tenderness. There is no right CVA tenderness, left CVA tenderness, guarding or rebound. Genitourinary:     General: Normal vulva.       Exam position: Lithotomy position. Labia:         Right: No rash, tenderness, lesion or injury. Left: No rash, tenderness, lesion or injury. Vagina: Normal. No foreign body. No vaginal discharge or bleeding. Cervix: Normal.      Uterus: Normal. Not enlarged and not fixed. Adnexa: Right adnexa normal and left adnexa normal.        Right: No mass, tenderness or fullness. Left: No mass, tenderness or fullness. Rectum: No external hemorrhoid. Musculoskeletal:      Cervical back: Normal range of motion. Lymphadenopathy:      Cervical: No cervical adenopathy. Upper Body:      Right upper body: No supraclavicular adenopathy. Left upper body: No supraclavicular adenopathy. Neurological:      Mental Status: She is alert and oriented to person, place, and time. Psychiatric:         Speech: Speech normal.         Behavior: Behavior normal.         Thought Content:  Thought content normal.         Judgment: Judgment normal.

## 2023-11-28 LAB
LAB AP GYN PRIMARY INTERPRETATION: NORMAL
Lab: NORMAL

## 2024-01-18 ENCOUNTER — TELEPHONE (OUTPATIENT)
Dept: OBGYN CLINIC | Facility: MEDICAL CENTER | Age: 34
End: 2024-01-18

## 2024-01-18 NOTE — TELEPHONE ENCOUNTER
Patient called into office today in regards to having concerns about her left breast. Offered patient appointment but patient stated that she was unable to make one as of now with her schedule. Patient is looking to see if she can have a script placed for the regional [facility] center to be seen. Please review, thank  you!

## 2024-01-19 ENCOUNTER — OFFICE VISIT (OUTPATIENT)
Dept: OBGYN CLINIC | Facility: MEDICAL CENTER | Age: 34
End: 2024-01-19
Payer: COMMERCIAL

## 2024-01-19 VITALS
DIASTOLIC BLOOD PRESSURE: 72 MMHG | HEIGHT: 64 IN | WEIGHT: 233 LBS | SYSTOLIC BLOOD PRESSURE: 120 MMHG | BODY MASS INDEX: 39.78 KG/M2

## 2024-01-19 DIAGNOSIS — N63.25 BREAST LUMP ON LEFT SIDE AT 9 O'CLOCK POSITION: Primary | ICD-10-CM

## 2024-01-19 PROCEDURE — 99214 OFFICE O/P EST MOD 30 MIN: CPT | Performed by: STUDENT IN AN ORGANIZED HEALTH CARE EDUCATION/TRAINING PROGRAM

## 2024-01-19 NOTE — TELEPHONE ENCOUNTER
Returned patient's call. Patient reports feeling a spot on her left breast that is raised and notices dimpling. Patient states she noticed it a couple days ago and would like imaging to be put in. At this time, the recommendations is for patient to come in for further evaluation by one of our providers in order to determine what the best plan of care would be and if imaging will be necessary. Patient denies feeling a lump or pain currently. Patient scheduled to see provider this morning for evaluation.

## 2024-01-22 PROBLEM — N63.25 BREAST LUMP ON LEFT SIDE AT 9 O'CLOCK POSITION: Status: ACTIVE | Noted: 2024-01-22

## 2024-01-22 NOTE — PROGRESS NOTES
"[facility] [address], [place]    Assessment/Plan:  Flavia Lyman is a 33 y.o.  who presents with a new concerning breast lump and dimpling at 9 o'clock on the left breast.    Breast lump on left side at 9 o'clock position  - May represent fibroglandular breast tissue  - Given the appreciated change and patient concern, as well as limitations of exam due to breast size, I recommend diagnostic breast imaging     Diagnoses and all orders for this visit:    Breast lump on left side at 9 o'clock position  -     US breast left limited (diagnostic); Future  -     Mammo diagnostic left w 3d & cad; Future    Other orders  -     Prenatal MV-Min-Fe Fum-FA-DHA (PRENATAL 1 PO); Take by mouth          Subjective:   Flavia Lyman is a 33 y.o.  female.  CC: breast lump    HPI: Flavia presents with a new concerning breast lump and dimpling on the left breast that she noticed within the last week.        ROS: Review of Systems   Constitutional:  Negative for chills and fever.   Respiratory:  Negative for cough and shortness of breath.    Cardiovascular:  Negative for chest pain and leg swelling.   Gastrointestinal:  Negative for abdominal pain, nausea and vomiting.   Genitourinary:  Negative for dysuria, frequency and urgency.   Neurological:  Negative for dizziness, light-headedness and headaches.       PFSH: The following portions of the patient's history were reviewed and updated as appropriate: allergies, current medications, past family history, past medical history, obstetric history, gynecologic history, past social history, past surgical history and problem list.       Objective:  /72   Ht 5' 4\" (1.626 m)   Wt 106 kg (233 lb)   LMP 2023 (Exact Date)   BMI 39.99 kg/m²    Physical Exam  Constitutional:       Appearance: Normal appearance.   HENT:      Head: Normocephalic and atraumatic.   Cardiovascular:      Rate and Rhythm: Normal rate. "   Pulmonary:      Effort: Pulmonary effort is normal.   Chest:   Breasts:     Breasts are symmetrical.      Right: Normal. No swelling, bleeding, inverted nipple, mass, nipple discharge, skin change or tenderness.      Left: Mass present. No swelling, bleeding, inverted nipple, nipple discharge, skin change or tenderness.      Comments: Appreciable dimpling when patient is sitting, and a small fibrous mobile rubbery lump in the area of patient's concern  Abdominal:      General: There is no distension.      Tenderness: There is no abdominal tenderness. There is no guarding.   Lymphadenopathy:      Upper Body:      Right upper body: No axillary adenopathy.      Left upper body: No axillary adenopathy.   Neurological:      Mental Status: She is alert.           Araceli Smart MD  [facility]  [facility]  1/22/2024 6:08 AM

## 2024-01-22 NOTE — ASSESSMENT & PLAN NOTE
- May represent fibroglandular breast tissue  - Given the appreciated change and patient concern, as well as limitations of exam due to breast size, I recommend diagnostic breast imaging

## 2024-02-27 ENCOUNTER — HOSPITAL ENCOUNTER (OUTPATIENT)
Dept: ULTRASOUND IMAGING | Facility: CLINIC | Age: 34
Discharge: HOME | End: 2024-02-27
Payer: COMMERCIAL

## 2024-02-27 ENCOUNTER — HOSPITAL ENCOUNTER (OUTPATIENT)
Dept: MAMMOGRAPHY | Facility: CLINIC | Age: 34
Discharge: HOME | End: 2024-02-27
Payer: COMMERCIAL

## 2024-02-27 VITALS — BODY MASS INDEX: 39.78 KG/M2 | WEIGHT: 233 LBS | HEIGHT: 64 IN

## 2024-02-27 DIAGNOSIS — N63.25 BREAST LUMP ON LEFT SIDE AT 9 O'CLOCK POSITION: ICD-10-CM

## 2024-02-27 PROCEDURE — G0279 TOMOSYNTHESIS, MAMMO: HCPCS

## 2024-02-27 PROCEDURE — 76642 ULTRASOUND BREAST LIMITED: CPT

## 2024-02-27 PROCEDURE — 77066 DX MAMMO INCL CAD BI: CPT

## 2024-03-05 ENCOUNTER — TELEPHONE (OUTPATIENT)
Age: 34
End: 2024-03-05

## 2024-03-05 NOTE — TELEPHONE ENCOUNTER
Patient called regarding mammogram results. Reviewed results with patient and recommendations from provider. Patient verbalized understanding. Patient encouraged to call with any questions or concerns.

## 2024-03-05 NOTE — TELEPHONE ENCOUNTER
----- Message from Araceli Smart MD sent at 3/4/2024  8:10 AM EST -----  Please let patient know the mammogram showed benign findings but they recommend follow up.    ASSESSMENT/BI-RADS CATEGORY:  Left: 3 - Probably Benign  Right: 3 - Probably Benign  Overall: 3 - Probably Benign     RECOMMENDATION:       - Ultrasound in 3 months for the left breast.       - Clinical management for the left breast.       - Diagnostic mammogram in 6 months for both breasts.

## 2024-03-08 DIAGNOSIS — R92.8 ABNORMAL FINDING ON BREAST IMAGING: Primary | ICD-10-CM

## 2024-05-09 ENCOUNTER — HOSPITAL ENCOUNTER (EMERGENCY)
Facility: HOSPITAL | Age: 34
Discharge: HOME | End: 2024-05-09
Attending: EMERGENCY MEDICINE | Admitting: EMERGENCY MEDICINE
Payer: COMMERCIAL

## 2024-05-09 VITALS
SYSTOLIC BLOOD PRESSURE: 127 MMHG | TEMPERATURE: 98.7 F | OXYGEN SATURATION: 98 % | RESPIRATION RATE: 16 BRPM | HEART RATE: 84 BPM | DIASTOLIC BLOOD PRESSURE: 84 MMHG

## 2024-05-09 DIAGNOSIS — R51.9 HEADACHE: Primary | ICD-10-CM

## 2024-05-09 LAB
ALBUMIN SERPL BCP-MCNC: 4.4 G/DL (ref 3.5–5)
ALP SERPL-CCNC: 34 U/L (ref 34–104)
ALT SERPL W P-5'-P-CCNC: 12 U/L (ref 7–52)
ANION GAP SERPL CALCULATED.3IONS-SCNC: 8 MMOL/L (ref 4–13)
AST SERPL W P-5'-P-CCNC: 13 U/L (ref 13–39)
BASOPHILS # BLD AUTO: 0.02 THOUSANDS/ÂΜL (ref 0–0.1)
BASOPHILS NFR BLD AUTO: 0 % (ref 0–1)
BILIRUB SERPL-MCNC: 1.02 MG/DL (ref 0.2–1)
BUN SERPL-MCNC: 12 MG/DL (ref 5–25)
CALCIUM SERPL-MCNC: 9.2 MG/DL (ref 8.4–10.2)
CHLORIDE SERPL-SCNC: 104 MMOL/L (ref 96–108)
CO2 SERPL-SCNC: 26 MMOL/L (ref 21–32)
CREAT SERPL-MCNC: 0.83 MG/DL (ref 0.6–1.3)
EOSINOPHIL # BLD AUTO: 0.28 THOUSAND/ÂΜL (ref 0–0.61)
EOSINOPHIL NFR BLD AUTO: 4 % (ref 0–6)
ERYTHROCYTE [DISTWIDTH] IN BLOOD BY AUTOMATED COUNT: 12.5 % (ref 11.6–15.1)
GFR SERPL CREATININE-BSD FRML MDRD: 91 ML/MIN/1.73SQ M
GLUCOSE SERPL-MCNC: 110 MG/DL (ref 65–140)
HCG SERPL QL: NEGATIVE
HCT VFR BLD AUTO: 43.7 % (ref 34.8–46.1)
HGB BLD-MCNC: 14.3 G/DL (ref 11.5–15.4)
IMM GRANULOCYTES # BLD AUTO: 0.02 THOUSAND/UL (ref 0–0.2)
IMM GRANULOCYTES NFR BLD AUTO: 0 % (ref 0–2)
LYMPHOCYTES # BLD AUTO: 1.86 THOUSANDS/ÂΜL (ref 0.6–4.47)
LYMPHOCYTES NFR BLD AUTO: 27 % (ref 14–44)
MCH RBC QN AUTO: 29.2 PG (ref 26.8–34.3)
MCHC RBC AUTO-ENTMCNC: 32.7 G/DL (ref 31.4–37.4)
MCV RBC AUTO: 89 FL (ref 82–98)
MONOCYTES # BLD AUTO: 0.47 THOUSAND/ÂΜL (ref 0.17–1.22)
MONOCYTES NFR BLD AUTO: 7 % (ref 4–12)
NEUTROPHILS # BLD AUTO: 4.13 THOUSANDS/ÂΜL (ref 1.85–7.62)
NEUTS SEG NFR BLD AUTO: 62 % (ref 43–75)
NRBC BLD AUTO-RTO: 0 /100 WBCS
PLATELET # BLD AUTO: 184 THOUSANDS/UL (ref 149–390)
PMV BLD AUTO: 9.9 FL (ref 8.9–12.7)
POTASSIUM SERPL-SCNC: 3.5 MMOL/L (ref 3.5–5.3)
PROT SERPL-MCNC: 7.1 G/DL (ref 6.4–8.4)
RBC # BLD AUTO: 4.89 MILLION/UL (ref 3.81–5.12)
SODIUM SERPL-SCNC: 138 MMOL/L (ref 135–147)
WBC # BLD AUTO: 6.78 THOUSAND/UL (ref 4.31–10.16)

## 2024-05-09 PROCEDURE — 36415 COLL VENOUS BLD VENIPUNCTURE: CPT | Performed by: EMERGENCY MEDICINE

## 2024-05-09 PROCEDURE — 99284 EMERGENCY DEPT VISIT MOD MDM: CPT

## 2024-05-09 PROCEDURE — 85025 COMPLETE CBC W/AUTO DIFF WBC: CPT | Performed by: EMERGENCY MEDICINE

## 2024-05-09 PROCEDURE — 96360 HYDRATION IV INFUSION INIT: CPT

## 2024-05-09 PROCEDURE — 84703 CHORIONIC GONADOTROPIN ASSAY: CPT | Performed by: EMERGENCY MEDICINE

## 2024-05-09 PROCEDURE — 80053 COMPREHEN METABOLIC PANEL: CPT | Performed by: EMERGENCY MEDICINE

## 2024-05-09 PROCEDURE — 99284 EMERGENCY DEPT VISIT MOD MDM: CPT | Performed by: EMERGENCY MEDICINE

## 2024-05-09 PROCEDURE — 96372 THER/PROPH/DIAG INJ SC/IM: CPT

## 2024-05-09 RX ORDER — SUMATRIPTAN 6 MG/.5ML
6 INJECTION, SOLUTION SUBCUTANEOUS ONCE
Status: COMPLETED | OUTPATIENT
Start: 2024-05-09 | End: 2024-05-09

## 2024-05-09 RX ORDER — SUMATRIPTAN 5 MG/1
1 SPRAY NASAL EVERY 12 HOURS PRN
Qty: 7 EACH | Refills: 0 | Status: SHIPPED | OUTPATIENT
Start: 2024-05-09 | End: 2024-05-16

## 2024-05-09 RX ADMIN — SUMATRIPTAN 6 MG: 6 INJECTION, SOLUTION SUBCUTANEOUS at 06:58

## 2024-05-09 RX ADMIN — SODIUM CHLORIDE 1000 ML: 0.9 INJECTION, SOLUTION INTRAVENOUS at 06:52

## 2024-05-09 NOTE — Clinical Note
Flavia Lyman was seen and treated in our emergency department on 5/9/2024.                Diagnosis:     Flavia  may return to work on return date.    She may return on this date: 05/10/2024         If you have any questions or concerns, please don't hesitate to call.      Alexander Chairez, DO    ______________________________           _______________          _______________  Hospital Representative                              Date                                Time

## 2024-05-09 NOTE — ED PROVIDER NOTES
History  Chief Complaint   Patient presents with    Headache     Pt presents to the ED with c/o left sided headache for several days. Nausea, no hx of migraines.     Patient is a 33-year-old female with prior medical history of headaches presents with gradual onset left-sided headache.  No vision changes.  No neck pain.  No nausea or vomiting.  No chest pain or shortness of breath.  No palpitations. No back pain. No n/v. No CP, SOB.       History provided by:  Patient  Headache  Associated symptoms: no abdominal pain, no back pain, no congestion, no cough, no diarrhea, no dizziness, no ear pain, no eye pain, no fatigue, no fever, no hearing loss, no near-syncope, no neck pain, no seizures, no sore throat and no vomiting        Prior to Admission Medications   Prescriptions Last Dose Informant Patient Reported? Taking?   LINOLEIC ACID-SUNFLOWER OIL PO  Self Yes No   Sig: Take by mouth   Patient not taking: Reported on 11/20/2023   Prenatal MV-Min-Fe Fum-FA-DHA (PRENATAL 1 PO)   Yes No   Sig: Take by mouth   Prenatal Vit-Fe Fumarate-FA (PRENATAL VITAMIN PO)  Self Yes No   Sig: Take by mouth   Patient not taking: Reported on 11/20/2023   acetaminophen (TYLENOL) 325 mg tablet   No No   Sig: Take 2 tablets (650 mg total) by mouth every 4 (four) hours as needed for mild pain   Patient not taking: Reported on 4/15/2021   ferrous sulfate 324 (65 Fe) mg  Self No No   Sig: Take 1 tablet (324 mg total) by mouth daily at bedtime Take on empty stomach or with vit C. Avoid milk, calcium, reflux meds, thyroid meds.   Patient not taking: Reported on 4/15/2021   ibuprofen (MOTRIN) 600 mg tablet   No No   Sig: Take 1 tablet (600 mg total) by mouth every 6 (six) hours for 28 days      Facility-Administered Medications: None       Past Medical History:   Diagnosis Date    Ear infection     Gestational hypertension 2/20/2021    Shingles     Urinary tract infection     Varicella        Past Surgical History:   Procedure Laterality Date      SECTION      [place]  DELIV[place] ONLY N/A 2017    Procedure:  SECTION ();  Surgeon: Chuck Bowman MD;  Location: [facility];  Service: Obstetrics    [place] N/A 2021    Procedure:  SECTION () REPEAT;  Surgeon: Jeanna Bowman MD;  Location: [facility];  Service: Obstetrics    TUBAL LIGATION Bilateral 2021    Procedure: LIGATION/COAGULATION TUBAL;  Surgeon: Jeanna Bowman MD;  Location: [facility];  Service: Obstetrics    WISDOM TOOTH EXTRACTION         Family History   Problem Relation Age of Onset    Fibromyalgia Mother     Hyperlipidemia Mother     Heart attack Father 44    Ovarian cancer Paternal Grandmother 89    Breast cancer Paternal Aunt 90    Anemia Sister     Heart attack Paternal Grandfather     Colon cancer Neg Hx      I have reviewed and agree with the history as documented.    E-Cigarette/Vaping    E-Cigarette Use Never User      E-Cigarette/Vaping Substances    Nicotine No     THC No     CBD No     Flavoring No     Other No     Unknown No      Social History     Tobacco Use    Smoking status: Never    Smokeless tobacco: Never   Vaping Use    Vaping status: Never Used   Substance Use Topics    Alcohol use: Not Currently     Comment: prior to pregnancy    Drug use: Never       Review of Systems   Constitutional:  Negative for chills, fatigue and fever.   HENT:  Negative for congestion, ear pain, hearing loss, mouth sores, nosebleeds, rhinorrhea and sore throat.    Eyes:  Negative for pain and visual disturbance.   Respiratory:  Negative for cough and shortness of breath.    Cardiovascular:  Negative for chest pain, palpitations and near-syncope.   Gastrointestinal:  Negative for abdominal pain, diarrhea and vomiting.   Endocrine: Negative for cold intolerance and heat intolerance.   Genitourinary:  Negative for dysuria and hematuria.   Musculoskeletal:  Negative for arthralgias, back pain and neck pain.   Skin:   Negative for color change and rash.   Allergic/Immunologic: Negative for environmental allergies and food allergies.   Neurological:  Positive for headaches. Negative for dizziness, seizures and syncope.   Hematological:  Negative for adenopathy.   Psychiatric/Behavioral:  Negative for agitation, confusion, hallucinations and self-injury.    All other systems reviewed and are negative.      Physical Exam  Physical Exam  Vitals and nursing note reviewed.   Constitutional:       General: She is not in acute distress.     Appearance: She is well-developed.   HENT:      Head: Normocephalic and atraumatic.      Mouth/Throat:      Mouth: Mucous membranes are moist.   Eyes:      Extraocular Movements: Extraocular movements intact.      Conjunctiva/sclera: Conjunctivae normal.      Pupils: Pupils are equal, round, and reactive to light.   Cardiovascular:      Rate and Rhythm: Normal rate and regular rhythm.      Heart sounds: No murmur heard.  Pulmonary:      Effort: Pulmonary effort is normal. No respiratory distress.      Breath sounds: Normal breath sounds.   Abdominal:      General: There is no distension.      Palpations: Abdomen is soft.      Tenderness: There is no abdominal tenderness.   Musculoskeletal:         General: No swelling.      Cervical back: Neck supple.   Skin:     General: Skin is warm and dry.      Capillary Refill: Capillary refill takes less than 2 seconds.      Coloration: Skin is not pale.      Findings: No bruising, erythema or lesion.   Neurological:      General: No focal deficit present.      Mental Status: She is alert and oriented to person, place, and time.      Cranial Nerves: No cranial nerve deficit.      Sensory: No sensory deficit.      Motor: No weakness.      Gait: Gait normal.   Psychiatric:         Mood and Affect: Mood normal.         Vital Signs  ED Triage Vitals   Temperature Pulse Respirations Blood Pressure SpO2   05/09/24 0631 05/09/24 0631 05/09/24 0631 05/09/24 0631  05/09/24 0631   98.7 °F (37.1 °C) 85 14 161/97 98 %      Temp Source Heart Rate Source Patient Position - Orthostatic VS BP Location FiO2 (%)   05/09/24 0631 05/09/24 0631 05/09/24 0700 05/09/24 0631 --   Oral Monitor Lying Left arm       Pain Score       05/09/24 0631       7           Vitals:    05/09/24 0631 05/09/24 0645 05/09/24 0700   BP: 161/97 160/98 127/84   Pulse: 85 81 84   Patient Position - Orthostatic VS:   Lying         Visual Acuity  Visual Acuity      Flowsheet Row Most Recent Value   L Pupil Size (mm) 3   R Pupil Size (mm) 3            ED Medications  Medications   sodium chloride 0.9 % bolus 1,000 mL (1,000 mL Intravenous New Bag 5/9/24 0652)   SUMAtriptan (IMITREX) subcutaneous injection 6 mg (6 mg Subcutaneous Given 5/9/24 0658)       Diagnostic Studies  Results Reviewed       Procedure Component Value Units Date/Time    hCG, qualitative pregnancy [[id number]]  (Normal) Collected: 05/09/24 0656    Lab Status: Final result Specimen: Blood from Arm, Right Updated: 05/09/24 0733     Preg, Serum Negative    Comprehensive metabolic panel [[id number]]  (Abnormal) Collected: 05/09/24 0656    Lab Status: Final result Specimen: Blood from Arm, Right Updated: 05/09/24 0725     Sodium 138 mmol/L      Potassium 3.5 mmol/L      Chloride 104 mmol/L      CO2 26 mmol/L      ANION GAP 8 mmol/L      BUN 12 mg/dL      Creatinine 0.83 mg/dL      Glucose 110 mg/dL      Calcium 9.2 mg/dL      AST 13 U/L      ALT 12 U/L      Alkaline Phosphatase 34 U/L      Total Protein 7.1 g/dL      Albumin 4.4 g/dL      Total Bilirubin 1.02 mg/dL      eGFR 91 ml/min/1.73sq m     Narrative:      National Kidney Disease Foundation guidelines for Chronic Kidney Disease (CKD):     Stage 1 with normal or high GFR (GFR > 90 mL/min/1.73 square meters)    Stage 2 Mild CKD (GFR = 60-89 mL/min/1.73 square meters)    Stage 3A Moderate CKD (GFR = 45-59 mL/min/1.73 square meters)    Stage 3B Moderate CKD (GFR = 30-44 mL/min/1.73 square meters)     Stage 4 Severe CKD (GFR = 15-29 mL/min/1.73 square meters)    Stage 5 End Stage CKD (GFR <15 mL/min/1.73 square meters)  Note: GFR calculation is accurate only with a steady state creatinine    CBC and differential [[id number]] Collected: 05/09/24 0656    Lab Status: Final result Specimen: Blood from Arm, Right Updated: 05/09/24 0705     WBC 6.78 Thousand/uL      RBC 4.89 Million/uL      Hemoglobin 14.3 g/dL      Hematocrit 43.7 %      MCV 89 fL      MCH 29.2 pg      MCHC 32.7 g/dL      RDW 12.5 %      MPV 9.9 fL      Platelets 184 Thousands/uL      nRBC 0 /100 WBCs      Segmented % 62 %      Immature Grans % 0 %      Lymphocytes % 27 %      Monocytes % 7 %      Eosinophils Relative 4 %      Basophils Relative 0 %      Absolute Neutrophils 4.13 Thousands/µL      Absolute Immature Grans 0.02 Thousand/uL      Absolute Lymphocytes 1.86 Thousands/µL      Absolute Monocytes 0.47 Thousand/µL      Eosinophils Absolute 0.28 Thousand/µL      Basophils Absolute 0.02 Thousands/µL                    No orders to display              Procedures  Procedures         ED Course         Patient is a 33-year-old female presents with gradual onset left-sided headache for the past several days.  No neck pain.  No nausea vomiting.  No vision changes.  Extremely well-appearing my exam.  Neuroexam is normal.  Patient feels much better after IV fluids and sumatriptan.  States that she has a history of migraine headaches.  She is comfortable going home with outpatient neurology follow-up.  Patient discharged home with extensive return precautions.                                          Medical Decision Making  Patient is a 33-year-old female presents with gradual onset left-sided headache for the past several days.  No neck pain.  No nausea vomiting.  No vision changes.  Extremely well-appearing my exam.  Neuroexam is normal.  Patient feels much better after IV fluids and sumatriptan.  States that she has a history of migraine headaches.   She is comfortable going home with outpatient neurology follow-up.  Patient discharged home with extensive return precautions.    Amount and/or Complexity of Data Reviewed  Labs: ordered.    Risk  Prescription drug management.             Disposition  Final diagnoses:   Headache     Time reflects when diagnosis was documented in both MDM as applicable and the Disposition within this note       Time User Action Codes Description Comment    5/9/2024  8:10 AM Alexander Chairez Add [R51.9] Headache           ED Disposition       ED Disposition   Discharge    Condition   Stable    Date/Time   Thu May 9, 2024  8:10 AM    Comment   Flavia Lyman discharge to home/self care.                   Follow-up Information       Follow up With Specialties Details Why Contact Info Additional Information    [facility]   [address] [place] [address]  [facility] [id number] [phone] [facility], [address] [place] [address], [facility] [place] [address], [place], [place], [address] [phone]            Patient's Medications   Discharge Prescriptions    SUMATRIPTAN (IMITREX) 5 MG/ACT NASAL SPRAY    1 spray (5 mg total) into each nostril every 12 (twelve) hours as needed for migraine for up to 7 days       Start Date: 5/9/2024  End Date: 5/16/2024       Order Dose: 5 mg       Quantity: 7 each    Refills: 0       No discharge procedures on file.    PDMP Review       None            ED Provider  Electronically Signed by             Alexander Chairez DO  05/09/24 0828

## 2024-06-18 ENCOUNTER — TELEPHONE (OUTPATIENT)
Dept: NEUROLOGY | Facility: CLINIC | Age: 34
End: 2024-06-18

## 2024-06-25 ENCOUNTER — OFFICE VISIT (OUTPATIENT)
Dept: NEUROLOGY | Facility: CLINIC | Age: 34
End: 2024-06-25
Payer: COMMERCIAL

## 2024-06-25 VITALS
TEMPERATURE: 97.8 F | WEIGHT: 231.6 LBS | BODY MASS INDEX: 39.75 KG/M2 | DIASTOLIC BLOOD PRESSURE: 76 MMHG | HEART RATE: 80 BPM | OXYGEN SATURATION: 99 % | SYSTOLIC BLOOD PRESSURE: 122 MMHG

## 2024-06-25 DIAGNOSIS — G43.809 CERVICOGENIC MIGRAINE: ICD-10-CM

## 2024-06-25 DIAGNOSIS — G43.009 MIGRAINE WITHOUT AURA AND WITHOUT STATUS MIGRAINOSUS, NOT INTRACTABLE: Primary | ICD-10-CM

## 2024-06-25 PROCEDURE — 99204 OFFICE O/P NEW MOD 45 MIN: CPT

## 2024-06-25 NOTE — PATIENT INSTRUCTIONS
Patient Instructions:    Headache Calendar  Please maintain a headache calendar  Consider using phone applications such as Migraine Buddy or Migraine Diary    Headache/migraine treatment:     Rescue medications (for immediate treatment of a headache):   It is ok to take ibuprofen, acetaminophen or naproxen (Advil, Tylenol,  Aleve, Excedrin) if they help your headaches you should limit these to No more than 3 times a week to avoid medication overuse/rebound headaches.     For your more moderate to severe migraines take this medication early   - Continue sumatriptan nasal spray as needed in case the headaches do come back.  Patient may repeat the nasal spray in 2 hours again if needed if the headaches still persist.  If she does have breakthrough headaches in the future she can call back and let me know or she can message me through the [facility] luis m and we will get her a follow-up appointment.    Over the counter preventive supplements for headaches/migraines (if you try, try for 3 months straight)  (to take every day to help prevent headaches - not to take at the time of headache):  There are combo pills online of these - none of which regulated by FDA and double check dosing - take appropriate dose only once a day- prevent a migraine, migravent, mind ease, migrelief   [x] Magnesium 400mg daily (If any diarrhea or upset stomach, decrease dose  as tolerated)  [x] Riboflavin (Vitamin B2) 400mg daily (may make your urine bright/neon yellow)    - All supplements can be purchased online      Lifestyle Recommendations:  [x] SLEEP - Maintain a regular sleep schedule: Adults need at least 7-8 hours of uninterrupted a night. Maintain good sleep hygiene:  Going to bed and waking up at consistent times, avoiding excessive daytime naps, avoiding caffeinated beverages in the evening, avoid excessive stimulation in the evening and generally using bed primarily for sleeping.  One hour before bedtime would recommend turning lights  down lower, decreasing your activity (may read quietly, listen to music at a low volume). When you get into bed, should eliminate all technology (no texting, emailing, playing with your phone, iPad or tablet in bed).  [x] HYDRATION - Maintain good hydration.  Drink  2L of fluid a day (4 typical small water bottles)  [x] DIET - Maintain good nutrition. In particular don't skip meals and try and eat healthy balanced meals regularly.  [x] TRIGGERS - Look for other triggers and avoid them: Limit caffeine to 1-2 cups a day or less. Avoid dietary triggers that you have noticed bring on your headaches (this could include aged cheese, peanuts, MSG, aspartame and nitrates).  [x] EXERCISE - physical exercise as we all know is good for you in many ways, and not only is good for your heart, but also is beneficial for your mental health, cognitive health and  chronic pain/headaches. I would encourage at the least 5 days of physical exercise weekly for at least 30 minutes.     Education and Follow-up  [x] Please call with any questions or concerns. Of course if any new concerning symptoms go to the emergency department.  [x] Follow up on an as needed basis with Rodri YOUNGER

## 2024-06-25 NOTE — PROGRESS NOTES
Review of Systems   Constitutional:  Negative for appetite change, fatigue and fever.   HENT: Negative.  Negative for hearing loss, tinnitus, trouble swallowing and voice change.    Eyes:  Positive for pain. Negative for photophobia and visual disturbance.   Respiratory: Negative.  Negative for shortness of breath.    Cardiovascular: Negative.  Negative for palpitations.   Gastrointestinal:  Positive for nausea and vomiting.   Endocrine: Negative.  Negative for cold intolerance.   Genitourinary: Negative.  Negative for dysuria, frequency and urgency.   Musculoskeletal:  Positive for neck pain. Negative for back pain, gait problem, myalgias and neck stiffness.   Skin: Negative.  Negative for rash.   Allergic/Immunologic: Negative.    Neurological:  Positive for weakness, numbness and headaches. Negative for dizziness, tremors, seizures, syncope, facial asymmetry, speech difficulty and light-headedness.   Hematological: Negative.  Does not bruise/bleed easily.   Psychiatric/Behavioral: Negative.  Negative for confusion, hallucinations and sleep disturbance.    All other systems reviewed and are negative.

## 2024-06-25 NOTE — PROGRESS NOTES
[facility] Consult  PATIENT:  Flavia Lyman  MRN:  [id number]  :  1990  DATE OF SERVICE:  2024  REFERRED BY: Self, Referral  PMD: No primary care provider on file.    Assessment/Plan:     Flavia Lyman is a very pleasant 33 y.o. female with a past medical history that gestational hypertension, status post  section and delivery, obesity includes  referred here for evaluation of headache.    Initial evaluation 2024    Migraine without aura and without status migrainosus/cervicogenic headache:    I had the pleasure of seeing Flavia in the office at [facility] in [place].  She is presenting today for an initial new patient consultation in regard to her headaches.  The patient noted that about a month ago, she noted the most intense headache that she has had in quite some time.  She stated that this headache came on abruptly and seem to encompass the whole entire left side of her head.  Patient noted that she woke up with this headache instantaneously, and it persisted for multiple days.  On the fifth day of having a headache, the patient went to the ED and received a migraine cocktail for the headache.  She was sent home with sumatriptan nasal spray and noted that the headache did not completely let up and returned on the 6-day.  On this day, the patient used the sumatriptan nasal spray and that seemed to completely abort the headache.  The patient notes that she has not ever had this  degree of a headache since then.  She has had much smaller headaches that were insignificant, but this headache had never seem to return.  The patient notes that she did have a history of migraine headaches when she was much younger.  The way she describes this headache as it started on the left side at the back of the head and neck area and worked its way up the entire side of the left side of the head, also going into the left jaw  as well.  The patient noted that she does not have any significant muscle tension or stiffness of the left side of her head and neck area, although she notes that she could possibly have a left sided TMJ.  She notes that she has quite a bit of jaw crepitus and also pain associated on the left side.  The patient noted that with this headache she had nausea, vomiting, photophobia, phonophobia, seeing more brightness or intense lights, and also lightheaded/dizzy when standing with the headache.  Any type of movement seem to make the headache worse.  Also, touching the neck would at times make the headache better but sometimes making it worse as well.  No positional change headaches noted.  Patient noted that 1 key trigger might have been that she had been intermittent fasting with a headache was occurring, she notes that possibly heat could be a trigger as well.  She is no longer intermittent fasting at this time.  When she went to the ED she did not receive any neuroimaging and does not have any significant neuroimaging at this time.  She had not followed up with a neurologist ever before in the past.    Based on the patient's description of the headache, it is unclear exactly what this headache may be related to.  Due to the lack of autonomic symptoms, seems less likely to be a trigeminal autonomic cephalalgia.  Although does have some potential components of a paroxysmal hemicrania.  Due to the fact that the patient had some left-sided jaw pain which has been going on for quite some time and the patient noted that her neck was sore at the time of the headache occurring, it seems more likely this could be related to just the one off cervicogenic migraine/migraine without aura.  Advised the patient that at this time I do not necessarily believe that we have to pursue any further neuroimaging.  If the headache does seem to return and the patient is getting this continuously again I would suggest that we do further  evaluation with an MRI brain at that time.  She still has sumatriptan nasal spray and I encouraged the patient to use this for abortive therapy if the headache does come on again.  Do not believe that the patient needs to start any preventative medications at this time, although she could certainly look into preventative supplementation with magnesium and riboflavin if she should so choose.  Advised patient to follow-up again on an as-needed basis, she can contact me or call the office if she would need to see me in the future.    Patient Instructions:    Headache Calendar  Please maintain a headache calendar  Consider using phone applications such as Migraine Buddy or Migraine Diary    Headache/migraine treatment:     Rescue medications (for immediate treatment of a headache):   It is ok to take ibuprofen, acetaminophen or naproxen (Advil, Tylenol,  Aleve, Excedrin) if they help your headaches you should limit these to No more than 3 times a week to avoid medication overuse/rebound headaches.     For your more moderate to severe migraines take this medication early   - Continue sumatriptan nasal spray as needed in case the headaches do come back.  Patient may repeat the nasal spray in 2 hours again if needed if the headaches still persist.  If she does have breakthrough headaches in the future she can call back and let me know or she can message me through the [employer] [facility] m and we will get her a follow-up appointment.    Over the counter preventive supplements for headaches/migraines (if you try, try for 3 months straight)  (to take every day to help prevent headaches - not to take at the time of headache):  There are combo pills online of these - none of which regulated by FDA and double check dosing - take appropriate dose only once a day- prevent a migraine, migravent, mind ease, migrelief   [x] Magnesium 400mg daily (If any diarrhea or upset stomach, decrease dose  as tolerated)  [x] Riboflavin (Vitamin B2) 400mg  daily (may make your urine bright/neon yellow)    - All supplements can be purchased online      Lifestyle Recommendations:  [x] SLEEP - Maintain a regular sleep schedule: Adults need at least 7-8 hours of uninterrupted a night. Maintain good sleep hygiene:  Going to bed and waking up at consistent times, avoiding excessive daytime naps, avoiding caffeinated beverages in the evening, avoid excessive stimulation in the evening and generally using bed primarily for sleeping.  One hour before bedtime would recommend turning lights down lower, decreasing your activity (may read quietly, listen to music at a low volume). When you get into bed, should eliminate all technology (no texting, emailing, playing with your phone, iPad or tablet in bed).  [x] HYDRATION - Maintain good hydration.  Drink  2L of fluid a day (4 typical small water bottles)  [x] DIET - Maintain good nutrition. In particular don't skip meals and try and eat healthy balanced meals regularly.  [x] TRIGGERS - Look for other triggers and avoid them: Limit caffeine to 1-2 cups a day or less. Avoid dietary triggers that you have noticed bring on your headaches (this could include aged cheese, peanuts, MSG, aspartame and nitrates).  [x] EXERCISE - physical exercise as we all know is good for you in many ways, and not only is good for your heart, but also is beneficial for your mental health, cognitive health and  chronic pain/headaches. I would encourage at the least 5 days of physical exercise weekly for at least 30 minutes.     Education and Follow-up  [x] Please call with any questions or concerns. Of course if any new concerning symptoms go to the emergency department.  [x] Follow up on an as needed basis with Rodri YOUNGER     CC:   We had the pleasure of evaluating Flavia Lyman in neurological consultation today. Flavia Lyman is a  33 y.o. female who presents today for evaluation of headaches.     History obtained from patient as well as  available medical record review.  History of Present Illness:   Current medical illnesses  or past medical history include gestational hypertension, status post  section and delivery, obesity       Interval History:    Headaches started at what age? 33 years old, about 1 month ago had a headache. Just on the left side from her neck all the way to the left side of her eye. Left side of the face she states, left jaw pain. Headache lasted for a total of 5 days, and she went to the ED finally and had a migraine cocktail. Finally, the headache seems to let up. Headache had again started the day after the ED and had the sumatriptan nasal spray she used.   How often do the headaches occur?   - as of 2024: 5-6 days of severe debilitating headache in May, did not represent again afterwards. Will occasionally get other headaches, usually the whole head and not as severe. Use to have a history of migraines when she was in college.   What time of the day do the headaches start?  No particular time of day, did wake up with it when it occurred   How long do the headaches last? Debilitating headaches lasted multiple days, Excedrin migraine and ibuprofen did not seem to help the headaches let up. Sumatriptan nasal spray seemed to let the headache let up afterwards.   Are you ever headache free? Yes    Aura? without aura     Where is your headache located and pain quality? Left side of the head, left side of the neck and left side of the jaw. Stabbing pain and throbbing of the entire head.   What is the intensity of pain? Worst 10/10, Average: 8/10  Associated symptoms:   [x] Nausea       [x] Vomiting   [x] Stiff or sore neck   [x] Problems with concentration  [x] Photophobia     [x]Phonophobia    [x] More shininess around lights, brighter more intense lights  [x] Prefer quiet, dark room  [x] Light-headed or dizzy (standing up with the headache)     Things that make the headache worse? Any movement of the head and neck  seemed to make it worse. Touching her neck made the headaches better and sometimes made the headache worse.    Any positional change headaches? No positional change headaches     Headache triggers:  intermittent fasting, possibly heat?     Have you seen someone else for headaches or pain? No  Have you had trigger point injection performed and how often? No  Have you had Botox injection performed and how often? No   Have you had epidural injections or transforaminal injections performed? Yes, two spinal blocks. 2017 and 2021 she states   Are you current pregnant or planning on getting pregnant? No, not planning on getting pregnant. Had a bilateral salpingectomy she states.   Have you ever had any Brain imaging? no    Last eye exam: Never had a need for an eye exam, no issues with her vision     What medications do you take or have you taken for your headaches?   ABORTIVE:    OTC medications: None  Prescription: Sumatriptan nasal spray     Past/ failed/contraindicated:  OTC medications: ibuprofen, Excedrin migraine  Prescription: None     PREVENTIVE:   None    Past/ failed/contraindicated:  None      LIFESTYLE  Sleep   - averages: at least 8 hours of sleep at night.   Problems falling asleep?:  Does have some issues falling asleep, her mind seems to always race at night.  Problems staying asleep?:  No    - Does have mouth guard for teeth grinding she states.  Does note to have some left-sided jaw pain, never formally diagnosed with TMJ.  No trouble breathing or significant snoring.     Physical activity: Usually physically active daily, does have [employer] bike she uses daily.     Water: 80 ounces a day mostly per day, some days are less and some are more  Caffeine: 1 large cup of tea a day    Mood: Denies have slight anxiety to an extent, some slight depression as well.     The following portions of the patient's history were reviewed and updated as appropriate: allergies, current medications, past family history,  past medical history, past social history, past surgical history and problem list.    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   Education: Bachelor's degree   Lives with three kids and     Illicit Drugs: denies  Alcohol/tobacco: Denies tobacco use, alcohol intake: social drinker    Past Medical History:     Past Medical History:   Diagnosis Date    Ear infection     Gestational hypertension 2021    Shingles     Urinary tract infection     Varicella        Patient Active Problem List   Diagnosis    BMI 39.0-39.9,adult    History of  delivery    Dichorionic diamniotic twin pregnancy in third trimester    Maternal obesity, antepartum, third trimester    Request for sterilization    S/P  section    Gestational hypertension    Seborrheic keratosis    Breast lump on left side at 9 o'clock position       Medications:      Current Outpatient Medications   Medication Sig Dispense Refill    ibuprofen (MOTRIN) 600 mg tablet Take 1 tablet (600 mg total) by mouth every 6 (six) hours for 28 days 120 tablet 0    SUMAtriptan (Imitrex) 5 MG/ACT nasal spray 1 spray (5 mg total) into each nostril every 12 (twelve) hours as needed for migraine for up to 7 days 7 each 0    acetaminophen (TYLENOL) 325 mg tablet Take 2 tablets (650 mg total) by mouth every 4 (four) hours as needed for mild pain (Patient not taking: Reported on 4/15/2021)  0    ferrous sulfate 324 (65 Fe) mg Take 1 tablet (324 mg total) by mouth daily at bedtime Take on empty stomach or with vit C. Avoid milk, calcium, reflux meds, thyroid meds. (Patient not taking: Reported on 4/15/2021) 30 tablet 6    LINOLEIC ACID-SUNFLOWER OIL PO Take by mouth (Patient not taking: Reported on 2023)      Prenatal MV-Min-Fe Fum-FA-DHA (PRENATAL 1 PO) Take by mouth (Patient not taking: Reported  on 6/25/2024)      Prenatal Vit-Fe Fumarate-FA (PRENATAL VITAMIN PO) Take by mouth (Patient not taking: Reported on 11/20/2023)       No current facility-administered medications for this visit.        Allergies:    No Known Allergies    Family History:     Family History   Problem Relation Age of Onset    Fibromyalgia Mother     Hyperlipidemia Mother     Heart attack Father 44    Ovarian cancer Paternal Grandmother 89    Breast cancer Paternal Aunt 90    Anemia Sister     Heart attack Paternal Grandfather     Colon cancer Neg Hx        Social History:       Social History     Socioeconomic History    Marital status: /Civil Union     Spouse name: Not on file    Number of children: Not on file    Years of education: Not on file    Highest education level: Not on file   Occupational History    Not on file   Tobacco Use    Smoking status: Never    Smokeless tobacco: Never   Vaping Use    Vaping status: Never Used   Substance and Sexual Activity    Alcohol use: Not Currently     Comment: prior to pregnancy    Drug use: Never    Sexual activity: Yes     Partners: Male     Birth control/protection: Condom Male   Other Topics Concern    Not on file   Social History Narrative    Not on file     Social Determinants of Health     Financial Resource Strain: Not on file   Food Insecurity: Not on file   Transportation Needs: Not on file   Physical Activity: Not on file   Stress: Not on file   Social Connections: Unknown (6/18/2024)    Received from [facility]     How often do you feel lonely or isolated from those around you? (Adult - for ages 18 years and over): Not on file   Intimate Partner Violence: Not on file   Housing Stability: Not on file         Objective:     Physical Exam:                                                                 Vitals:            Constitutional:    /76 (BP Location: Left arm, Patient Position: Sitting, Cuff Size: Adult)   Pulse 80   Temp 97.8 °F (36.6 °C)  (Temporal)   Wt 105 kg (231 lb 9.6 oz)   SpO2 99%   BMI 39.75 kg/m²   BP Readings from Last 3 Encounters:   06/25/24 122/76   05/09/24 127/84   01/19/24 120/72     Pulse Readings from Last 3 Encounters:   06/25/24 80   05/09/24 84   02/20/21 72         Well developed, well nourished, well groomed. No dysmorphic features.       Psychiatric:  Normal behavior and appropriate affect        Neurological Examination:     Mental status/cognitive function:   Orientated to time, place and person. Recent and remote memory intact. Attention span and concentration as well as fund of knowledge are appropriate for age. Normal language and spontaneous speech.    Cranial Nerves:  II-visual fields full.   III, IV, VI-Pupils were equal, round, and reactive to light and accomodation. Extraocular movements were full and conjugate without nystagmus. Conjugate gaze, normal smooth pursuits, normal saccades   V-facial sensation symmetric.    VII-facial expression symmetric, intact forehead wrinkle, strong eye closure, symmetric smile    VIII-hearing grossly intact bilaterally   IX, X-palate elevation symmetric, no dysarthria.   XI-shoulder shrug strength intact    XII-tongue protrusion midline.    Motor Exam: symmetric bulk and tone throughout, no pronator drift. Power/strength 5/5 bilateral upper and lower extremities, no atrophy, fasciculations or abnormal movements noted.   Sensory: grossly intact light touch in all extremities.   Reflexes: brachioradialis 2+, biceps 2+, knee 2+ bilaterally  Coordination: Finger nose finger intact bilaterally, no apparent dysmetria, ataxia or tremor noted  Gait: steady casual and tandem gait.       Pertinent Imaging:     No pertinent neuroimaging available at this time.       Review of Systems:     Review of Systems   Constitutional:  Negative for appetite change, fatigue and fever.   HENT: Negative.  Negative for hearing loss, tinnitus, trouble swallowing and voice change.    Eyes:  Positive for  pain. Negative for photophobia and visual disturbance.   Respiratory: Negative.  Negative for shortness of breath.    Cardiovascular: Negative.  Negative for palpitations.   Gastrointestinal:  Positive for nausea and vomiting.   Endocrine: Negative.  Negative for cold intolerance.   Genitourinary: Negative.  Negative for dysuria, frequency and urgency.   Musculoskeletal:  Positive for neck pain. Negative for back pain, gait problem, myalgias and neck stiffness.   Skin: Negative.  Negative for rash.   Allergic/Immunologic: Negative.    Neurological:  Positive for weakness, numbness and headaches. Negative for dizziness, tremors, seizures, syncope, facial asymmetry, speech difficulty and light-headedness.   Hematological: Negative.  Does not bruise/bleed easily.   Psychiatric/Behavioral: Negative.  Negative for confusion, hallucinations and sleep disturbance.    All other systems reviewed and are negative.     I have spent 50 minutes with the patient today in which greater than 50% of this time was spent in counseling/coordination of care regarding Instructions for management, Patient and family education, Risk factor reductions, Impressions, Counseling / Coordination of care, Documenting in the medical record, and Obtaining or reviewing history  . I also spent 10 minutes non face to face for this patient the same day.     Activity Minutes   Precharting/reviewing 5   Patient care/counseling 50   Postcharting/care coordination 5       Author:  Robert Stafford PA-C 6/25/2024 11:22 AM

## 2024-07-01 ENCOUNTER — HOSPITAL ENCOUNTER (OUTPATIENT)
Dept: ULTRASOUND IMAGING | Facility: CLINIC | Age: 34
Discharge: HOME | End: 2024-07-01
Payer: COMMERCIAL

## 2024-07-01 DIAGNOSIS — R92.8 ABNORMAL FINDING ON BREAST IMAGING: ICD-10-CM

## 2024-07-01 PROCEDURE — 76642 ULTRASOUND BREAST LIMITED: CPT

## 2024-07-17 ENCOUNTER — OFFICE VISIT (OUTPATIENT)
Dept: FAMILY MEDICINE CLINIC | Facility: CLINIC | Age: 34
End: 2024-07-17
Payer: COMMERCIAL

## 2024-07-17 VITALS
SYSTOLIC BLOOD PRESSURE: 122 MMHG | TEMPERATURE: 98 F | OXYGEN SATURATION: 98 % | RESPIRATION RATE: 16 BRPM | HEART RATE: 92 BPM | WEIGHT: 230.3 LBS | BODY MASS INDEX: 39.32 KG/M2 | DIASTOLIC BLOOD PRESSURE: 86 MMHG | HEIGHT: 64 IN

## 2024-07-17 DIAGNOSIS — Z13.21 ENCOUNTER FOR SCREENING FOR NUTRITIONAL DISORDER: ICD-10-CM

## 2024-07-17 DIAGNOSIS — Z00.00 ANNUAL PHYSICAL EXAM: Primary | ICD-10-CM

## 2024-07-17 PROCEDURE — 99385 PREV VISIT NEW AGE 18-39: CPT | Performed by: STUDENT IN AN ORGANIZED HEALTH CARE EDUCATION/TRAINING PROGRAM

## 2024-07-17 NOTE — PROGRESS NOTES
Adult Annual Physical  Name: Flavia Lyman      : 1990      MRN: [id number]  Encounter Provider: Amie Anglin MD  Encounter Date: 2024   Encounter department: [facility]    Assessment & Plan   1. Annual physical exam  -     CBC and differential; Future  -     Comprehensive metabolic panel; Future  -     Hemoglobin A1C; Future  -     Lipid Panel with Direct LDL reflex; Future  -     TSH, 3rd generation with Free T4 reflex; Future  2. Encounter for screening for nutritional disorder  -     Vitamin D 25 hydroxy; Future    Immunizations and preventive care screenings were discussed with patient today. Appropriate education was printed on patient's after visit summary.    Counseling:  Exercise: the importance of regular exercise/physical activity was discussed. Recommend exercise 3-5 times per week for at least 30 minutes.     Depression Screening and Follow-up Plan: Patient was screened for depression during today's encounter. They screened negative with a PHQ-2 score of 0.      History of Present Illness     Adult Annual Physical:  Patient presents for annual physical.     Diet and Physical Activity:  - Diet/Nutrition: well balanced diet.  - Exercise: walking.    Depression Screening:  - PHQ-2 Score: 0    General Health:  - Sleep: sleeps well.  - Hearing: normal hearing right ear and normal hearing left ear.    Review of Systems   Constitutional:  Negative for appetite change.   HENT:  Negative for congestion.    Respiratory:  Negative for cough, chest tightness and shortness of breath.    Cardiovascular:  Negative for chest pain.   Gastrointestinal:  Negative for abdominal pain.   Genitourinary:  Negative for dysuria.   Neurological:  Negative for dizziness, light-headedness and headaches.     Past Medical History   Past Medical History:   Diagnosis Date    Ear infection     Gestational hypertension 2021    Shingles     Urinary tract infection      Varicella      Past Surgical History:   Procedure Laterality Date     SECTION      [place]  DELIVERY ONLY N/A 2017    Procedure:  SECTION ();  Surgeon: Chuck Bowman MD;  Location: [facility];  Service: Obstetrics    [place]  DELIVERY ONLY N/A 2021    Procedure:  SECTION () REPEAT;  Surgeon: Jeanna Bowman MD;  Location: [facility];  Service: Obstetrics    TUBAL LIGATION Bilateral 2021    Procedure: LIGATION/COAGULATION TUBAL;  Surgeon: Jeanna Bowman MD;  Location: [facility];  Service: Obstetrics    WISDOM TOOTH EXTRACTION       Family History   Problem Relation Age of Onset    Fibromyalgia Mother     Hyperlipidemia Mother     Heart attack Father 44    Ovarian cancer Paternal Grandmother 89    Breast cancer Paternal Aunt 90    Anemia Sister     Heart attack Paternal Grandfather     Colon cancer Neg Hx      Current Outpatient Medications on File Prior to Visit   Medication Sig Dispense Refill    Prenatal Vit-Fe Fumarate-FA (PRENATAL VITAMIN PO) Take by mouth      acetaminophen (TYLENOL) 325 mg tablet Take 2 tablets (650 mg total) by mouth every 4 (four) hours as needed for mild pain (Patient not taking: Reported on 4/15/2021)  0    ferrous sulfate 324 (65 Fe) mg Take 1 tablet (324 mg total) by mouth daily at bedtime Take on empty stomach or with vit C. Avoid milk, calcium, reflux meds, thyroid meds. (Patient not taking: Reported on 4/15/2021) 30 tablet 6    ibuprofen (MOTRIN) 600 mg tablet Take 1 tablet (600 mg total) by mouth every 6 (six) hours for 28 days 120 tablet 0    LINOLEIC ACID-SUNFLOWER OIL PO Take by mouth (Patient not taking: Reported on 2023)      Prenatal MV-Min-Fe Fum-FA-DHA (PRENATAL 1 PO) Take by mouth (Patient not taking: Reported on 2024)      SUMAtriptan (Imitrex) 5 MG/ACT nasal spray 1 spray (5 mg total) into each nostril every 12 (twelve) hours as needed for migraine for up to 7 days 7 each 0     No  "current facility-administered medications on file prior to visit.   No Known Allergies   Current Outpatient Medications on File Prior to Visit   Medication Sig Dispense Refill    Prenatal Vit-Fe Fumarate-FA (PRENATAL VITAMIN PO) Take by mouth      acetaminophen (TYLENOL) 325 mg tablet Take 2 tablets (650 mg total) by mouth every 4 (four) hours as needed for mild pain (Patient not taking: Reported on 4/15/2021)  0    ferrous sulfate 324 (65 Fe) mg Take 1 tablet (324 mg total) by mouth daily at bedtime Take on empty stomach or with vit C. Avoid milk, calcium, reflux meds, thyroid meds. (Patient not taking: Reported on 4/15/2021) 30 tablet 6    ibuprofen (MOTRIN) 600 mg tablet Take 1 tablet (600 mg total) by mouth every 6 (six) hours for 28 days 120 tablet 0    LINOLEIC ACID-SUNFLOWER OIL PO Take by mouth (Patient not taking: Reported on 11/20/2023)      Prenatal MV-Min-Fe Fum-FA-DHA (PRENATAL 1 PO) Take by mouth (Patient not taking: Reported on 6/25/2024)      SUMAtriptan (Imitrex) 5 MG/ACT nasal spray 1 spray (5 mg total) into each nostril every 12 (twelve) hours as needed for migraine for up to 7 days 7 each 0     No current facility-administered medications on file prior to visit.      Social History     Tobacco Use    Smoking status: Never    Smokeless tobacco: Never   Vaping Use    Vaping status: Never Used   Substance and Sexual Activity    Alcohol use: Not Currently     Alcohol/week: 1.0 - 2.0 standard drink of alcohol     Types: 1 - 2 Glasses of wine per week     Comment: Once a month    Drug use: Never    Sexual activity: Yes     Partners: Male     Birth control/protection: Condom Male       Objective     /86   Pulse 92   Temp 98 °F (36.7 °C)   Resp 16   Ht 5' 4\" (1.626 m)   Wt 104 kg (230 lb 4.8 oz)   SpO2 98%   Breastfeeding No   BMI 39.53 kg/m²     Physical Exam  Vitals reviewed.   Constitutional:       General: She is not in acute distress.     Appearance: She is obese.   HENT:      Head: " Normocephalic and atraumatic.      Right Ear: Tympanic membrane, ear canal and external ear normal.      Left Ear: Tympanic membrane, ear canal and external ear normal.      Nose: Nose normal.      Mouth/Throat:      Mouth: Mucous membranes are moist.      Pharynx: Oropharynx is clear.   Eyes:      Extraocular Movements: Extraocular movements intact.   Cardiovascular:      Rate and Rhythm: Normal rate and regular rhythm.      Pulses: Normal pulses.      Heart sounds: Normal heart sounds.   Pulmonary:      Effort: Pulmonary effort is normal.      Breath sounds: Normal breath sounds.   Abdominal:      Palpations: Abdomen is soft.      Tenderness: There is no abdominal tenderness.   Musculoskeletal:      Cervical back: Neck supple.   Neurological:      Mental Status: She is alert.   Psychiatric:         Mood and Affect: Mood normal.       Administrative Statements   I have spent a total time of 30 minutes in caring for this patient on the day of the visit/encounter including Impressions, Documenting in the medical record, Reviewing / ordering tests, medicine, procedures  , and Obtaining or reviewing history  .

## 2024-07-24 LAB — HBA1C MFR BLD HPLC: 5.6 %

## 2024-09-30 ENCOUNTER — TELEPHONE (OUTPATIENT)
Age: 34
End: 2024-09-30

## 2024-09-30 ENCOUNTER — HOSPITAL ENCOUNTER (OUTPATIENT)
Dept: MAMMOGRAPHY | Facility: CLINIC | Age: 34
Discharge: HOME | End: 2024-09-30
Payer: COMMERCIAL

## 2024-09-30 VITALS — BODY MASS INDEX: 39.27 KG/M2 | WEIGHT: 230 LBS | HEIGHT: 64 IN

## 2024-09-30 DIAGNOSIS — R92.8 ABNORMAL FINDING ON BREAST IMAGING: ICD-10-CM

## 2024-09-30 PROCEDURE — 77066 DX MAMMO INCL CAD BI: CPT

## 2024-09-30 PROCEDURE — G0279 TOMOSYNTHESIS, MAMMO: HCPCS

## 2024-09-30 NOTE — TELEPHONE ENCOUNTER
Spoke to patient and asked patient if the z00 code not work?  Vitamin D was coded with z13.21.  patient going to contact [facility] and speak to them regarding how they coded with CPT codes.

## 2024-09-30 NOTE — TELEPHONE ENCOUNTER
Patient called back stating that the insurance company told her the Z codes are routine codes and would not be covered, please advise if anything can be done

## 2024-09-30 NOTE — TELEPHONE ENCOUNTER
I spoke to Chrissie regarding this. Based on results we may be able to add code for hyperlipidemia (E78.5) which may cover some of these labs. I would verify this with Dr. Anglin when she comes back and see if anything could be added. Vitamin D is typically only covered with dx of vitamin d deficiency (E55.9). Since it is [facility], if any dx codes need to be added we would either need to call or update via lab portal. It is helpful to have the invoice number if calling. Let me know if you need any help with this!

## 2024-09-30 NOTE — TELEPHONE ENCOUNTER
"Pt states she received a very large bill from [facility] for her recent labs. She already spoke to [facility] and her insurance company and her claim was denied due to coding. She has general codes listed and need \"diagnostic\" codes.   These are the denied codes: 91978, 23156, 48743.    Can the bill be resubmitted with diagnostic coding?    Please reach out to patient   "

## 2024-10-09 ENCOUNTER — TELEPHONE (OUTPATIENT)
Dept: FAMILY MEDICINE CLINIC | Facility: CLINIC | Age: 34
End: 2024-10-09

## 2024-10-09 NOTE — TELEPHONE ENCOUNTER
Spoke with Alena from [employer] and added E78.5 code to see if that can get covered. She applied the code to see if that will cover the lab script.

## 2024-10-09 NOTE — TELEPHONE ENCOUNTER
Called [facility] to see if we can add on code E78.5 to the lab work that was completed back on 7/24/2024.

## 2025-03-05 DIAGNOSIS — Z00.6 ENCOUNTER FOR EXAMINATION FOR NORMAL COMPARISON OR CONTROL IN CLINICAL RESEARCH PROGRAM: ICD-10-CM

## 2025-05-01 ENCOUNTER — TELEMEDICINE (OUTPATIENT)
Dept: OTHER | Facility: HOSPITAL | Age: 35
End: 2025-05-01
Payer: COMMERCIAL

## 2025-05-01 DIAGNOSIS — L23.7 POISON IVY DERMATITIS: Primary | ICD-10-CM

## 2025-05-01 PROCEDURE — 99213 OFFICE O/P EST LOW 20 MIN: CPT | Performed by: PHYSICIAN ASSISTANT

## 2025-05-01 RX ORDER — AZELASTINE 1 MG/ML
1 SPRAY, METERED NASAL 2 TIMES DAILY
Qty: 1 ML | Refills: 0 | Status: SHIPPED | OUTPATIENT
Start: 2025-05-01

## 2025-05-01 RX ORDER — PREDNISONE 10 MG/1
10 TABLET ORAL DAILY
Qty: 30 TABLET | Refills: 0 | Status: SHIPPED | OUTPATIENT
Start: 2025-05-01

## 2025-05-01 NOTE — PROGRESS NOTES
Virtual Regular Visit  Name: Flavia Lyman      : 1990      MRN: [id number]  Encounter Provider: Your Video Visit Provider  Encounter Date: 2025   Encounter department: VIRTUAL CARE       Verification of patient location:  Patient is located at Home in the following state in which I hold an active license [place] :  Assessment & Plan  Poison ivy dermatitis    Orders:    azelastine (ASTELIN) 0.1 % nasal spray; 1 spray into each nostril 2 (two) times a day Use in each nostril as directed    predniSONE 10 mg tablet; Take 1 tablet (10 mg total) by mouth daily Take 4 tablets daily x 3 days, 3 tablets daily x 3 days, 2 tabs daily x 3 days, 1 tab daily x 3 days        Encounter provider Your Video Visit Provider    The patient was identified by name and date of birth. Flavia Lyman was informed that this is a telemedicine visit and that the visit is being conducted through the Epic Embedded platform. She agrees to proceed..  My office door was closed. No one else was in the room. She acknowledged consent and understanding of privacy and security of the video platform. The patient has agreed to participate and understands they can discontinue the visit at any time.    Patient is aware this is a billable service.     History obtained from: patient  History of Present Illness     34 y.o. female presents for evaluation of poison ivy on neck, around left eye, and left hand; notes she typically gets poison ivy at least yearly but this is worse than normal. Has not been on steroids previously, is taking daily allergy medication, using topical medications OTC with minimal relief. Denies fever, chills, n/v/d, sob, cp, ha blurry vision, dizziness or fainting.       Review of Systems   Skin:  Positive for rash.   All other systems reviewed and are negative.      Objective   There were no vitals taken for this visit.    Physical Exam  Constitutional:       General: She is not in acute distress.      Appearance: Normal appearance. She is not ill-appearing or toxic-appearing.   HENT:      Head: Normocephalic and atraumatic.      Right Ear: External ear normal.      Left Ear: External ear normal.      Nose: Congestion and rhinorrhea present.   Eyes:      Extraocular Movements: Extraocular movements intact.      Conjunctiva/sclera: Conjunctivae normal.   Pulmonary:      Effort: Pulmonary effort is normal. No respiratory distress.      Breath sounds: No stridor. No wheezing.   Skin:     Findings: Rash present.             Comments: Rash consistent with poison ivy dermatitis     Neurological:      Mental Status: She is alert and oriented to person, place, and time. Mental status is at baseline.   Psychiatric:         Mood and Affect: Mood normal.         Behavior: Behavior normal.         Thought Content: Thought content normal.         Judgment: Judgment normal.         Visit Time  Total Visit Duration: 8 minutes not including the time spent for establishing the audio/video connection.

## (undated) DEVICE — PACK C-SECTION PBDS

## (undated) DEVICE — SUT VICRYL 2-0 CT-1 36 IN J945H

## (undated) DEVICE — GLOVE SRG BIOGEL ECLIPSE 6

## (undated) DEVICE — GLOVE INDICATOR UNDERGLOVE SZ 6 BLUE

## (undated) DEVICE — SUT MONOCRYL 4-0 PS-2 27 IN Y426H

## (undated) DEVICE — SUT VICRYL 0 CT 36 IN J958H

## (undated) DEVICE — ABG MICROSTICKS SAFETY

## (undated) DEVICE — CHLORAPREP HI-LITE 26ML ORANGE

## (undated) DEVICE — SUT PLAIN 3-0 CT-1 27 IN 842H

## (undated) DEVICE — ADHESIVE SKN CLSR HISTOACRYL FLEX 0.5ML LF